# Patient Record
Sex: MALE | Race: WHITE | Employment: FULL TIME | URBAN - METROPOLITAN AREA
[De-identification: names, ages, dates, MRNs, and addresses within clinical notes are randomized per-mention and may not be internally consistent; named-entity substitution may affect disease eponyms.]

---

## 2017-06-09 PROBLEM — R11.0 NAUSEA IN ADULT: Status: ACTIVE | Noted: 2017-06-09

## 2017-06-09 PROBLEM — M62.08 DIASTASIS OF RECTUS ABDOMINIS: Status: ACTIVE | Noted: 2017-06-09

## 2017-06-09 PROBLEM — R73.09 ABNORMAL GLUCOSE: Status: ACTIVE | Noted: 2017-06-09

## 2017-08-26 PROBLEM — G57.93 NEUROPATHY OF BOTH FEET: Status: ACTIVE | Noted: 2017-08-26

## 2017-11-30 PROBLEM — E66.01 OBESITY, MORBID (HCC): Status: ACTIVE | Noted: 2017-11-30

## 2019-04-12 ENCOUNTER — HOSPITAL ENCOUNTER (OUTPATIENT)
Dept: ULTRASOUND IMAGING | Age: 54
Discharge: HOME | End: 2019-04-12
Attending: FAMILY MEDICINE
Payer: COMMERCIAL

## 2019-04-12 DIAGNOSIS — R10.32 LEFT LOWER QUADRANT PAIN: ICD-10-CM

## 2019-04-12 PROCEDURE — 76700 US EXAM ABDOM COMPLETE: CPT

## 2019-04-12 NOTE — PROGRESS NOTES
Let patient know his liver ultrasound does show an enlarged liver. It could be fatty liver, but he cannot be certain based on this ultrasound. I would like for him to see a gastroenterologist to be sure there is nothing more going on than fatty liver. He does have a small stone in his left kidney but it is not causing any blockage.

## 2019-10-02 PROBLEM — I83.12 VARICOSE VEINS OF BOTH LOWER EXTREMITIES WITH INFLAMMATION: Status: ACTIVE | Noted: 2019-10-02

## 2019-10-02 PROBLEM — I83.11 VARICOSE VEINS OF BOTH LOWER EXTREMITIES WITH INFLAMMATION: Status: ACTIVE | Noted: 2019-10-02

## 2019-10-02 PROBLEM — G25.81 RESTLESS LEG: Status: ACTIVE | Noted: 2019-10-02

## 2019-10-02 PROBLEM — F41.9 ANXIETY: Status: ACTIVE | Noted: 2019-10-02

## 2019-12-28 ENCOUNTER — HOSPITAL ENCOUNTER (OUTPATIENT)
Dept: CT IMAGING | Age: 54
Discharge: HOME | End: 2019-12-28
Attending: UROLOGY
Payer: COMMERCIAL

## 2019-12-28 DIAGNOSIS — R31.0 GROSS HEMATURIA: ICD-10-CM

## 2019-12-28 PROCEDURE — 74176 CT ABD & PELVIS W/O CONTRAST: CPT

## 2020-01-02 NOTE — PROGRESS NOTES
I reviewed with him. Please follow up with him to ensure Dr. Karley Villarreal has ordered a bone scan. If not, I would be happy to order his bone scan.

## 2020-01-27 ENCOUNTER — HOSPITAL ENCOUNTER (OUTPATIENT)
Dept: NUCLEAR MEDICINE | Age: 55
Discharge: HOME | End: 2020-01-27
Attending: FAMILY MEDICINE
Payer: COMMERCIAL

## 2020-01-27 DIAGNOSIS — M89.8X5 LYTIC BONE LESION OF HIP: ICD-10-CM

## 2020-01-27 PROCEDURE — A9503 TC99M MEDRONATE: HCPCS

## 2020-01-27 PROCEDURE — 78306 BONE IMAGING WHOLE BODY: CPT

## 2021-03-19 ENCOUNTER — HOSPITAL ENCOUNTER (OUTPATIENT)
Dept: GENERAL RADIOLOGY | Age: 56
Discharge: HOME | End: 2021-03-19
Payer: COMMERCIAL

## 2021-03-19 DIAGNOSIS — G89.4 CHRONIC PAIN SYNDROME: ICD-10-CM

## 2021-03-19 DIAGNOSIS — M54.16 RADICULOPATHY, LUMBAR REGION: ICD-10-CM

## 2021-03-19 PROCEDURE — 72050 X-RAY EXAM NECK SPINE 4/5VWS: CPT

## 2021-03-19 PROCEDURE — 72110 X-RAY EXAM L-2 SPINE 4/>VWS: CPT

## 2021-04-27 PROBLEM — E11.9 CONTROLLED TYPE 2 DIABETES MELLITUS WITHOUT COMPLICATION, WITHOUT LONG-TERM CURRENT USE OF INSULIN (HCC): Status: ACTIVE | Noted: 2021-04-27

## 2021-10-13 PROBLEM — E11.40 TYPE 2 DIABETES MELLITUS WITH DIABETIC NEUROPATHY (HCC): Status: ACTIVE | Noted: 2021-10-13

## 2022-02-21 ENCOUNTER — TRANSCRIBE ORDER (OUTPATIENT)
Dept: SCHEDULING | Age: 57
End: 2022-02-21

## 2022-02-21 DIAGNOSIS — M47.816 SPONDYLOSIS OF LUMBAR REGION WITHOUT MYELOPATHY OR RADICULOPATHY: Primary | ICD-10-CM

## 2022-03-01 ENCOUNTER — TRANSCRIBE ORDER (OUTPATIENT)
Dept: REGISTRATION | Age: 57
End: 2022-03-01

## 2022-03-01 ENCOUNTER — HOSPITAL ENCOUNTER (OUTPATIENT)
Dept: GENERAL RADIOLOGY | Age: 57
Discharge: HOME | End: 2022-03-01
Attending: PHYSICIAN ASSISTANT
Payer: COMMERCIAL

## 2022-03-01 ENCOUNTER — HOSPITAL ENCOUNTER (OUTPATIENT)
Dept: MRI IMAGING | Age: 57
Discharge: HOME | End: 2022-03-01
Attending: PHYSICIAN ASSISTANT
Payer: COMMERCIAL

## 2022-03-01 DIAGNOSIS — M54.14 THORACIC RADICULOPATHY: ICD-10-CM

## 2022-03-01 DIAGNOSIS — M54.14 THORACIC RADICULOPATHY: Primary | ICD-10-CM

## 2022-03-01 DIAGNOSIS — M47.816 SPONDYLOSIS OF LUMBAR REGION WITHOUT MYELOPATHY OR RADICULOPATHY: ICD-10-CM

## 2022-03-01 PROCEDURE — 72148 MRI LUMBAR SPINE W/O DYE: CPT

## 2022-03-01 PROCEDURE — 72070 X-RAY EXAM THORAC SPINE 2VWS: CPT

## 2022-03-18 PROBLEM — E11.9 CONTROLLED TYPE 2 DIABETES MELLITUS WITHOUT COMPLICATION, WITHOUT LONG-TERM CURRENT USE OF INSULIN (HCC): Status: ACTIVE | Noted: 2021-04-27

## 2022-03-18 PROBLEM — G57.93 NEUROPATHY OF BOTH FEET: Status: ACTIVE | Noted: 2017-08-26

## 2022-03-19 PROBLEM — E11.40 TYPE 2 DIABETES MELLITUS WITH DIABETIC NEUROPATHY (HCC): Status: ACTIVE | Noted: 2021-10-13

## 2022-03-19 PROBLEM — E66.01 OBESITY, MORBID (HCC): Status: ACTIVE | Noted: 2017-11-30

## 2022-03-19 PROBLEM — M62.08 DIASTASIS OF RECTUS ABDOMINIS: Status: ACTIVE | Noted: 2017-06-09

## 2022-03-19 PROBLEM — G25.81 RESTLESS LEG: Status: ACTIVE | Noted: 2019-10-02

## 2022-03-19 PROBLEM — R11.0 NAUSEA IN ADULT: Status: ACTIVE | Noted: 2017-06-09

## 2022-03-19 PROBLEM — I83.12 VARICOSE VEINS OF BOTH LOWER EXTREMITIES WITH INFLAMMATION: Status: ACTIVE | Noted: 2019-10-02

## 2022-03-19 PROBLEM — I83.11 VARICOSE VEINS OF BOTH LOWER EXTREMITIES WITH INFLAMMATION: Status: ACTIVE | Noted: 2019-10-02

## 2022-03-20 PROBLEM — F41.9 ANXIETY: Status: ACTIVE | Noted: 2019-10-02

## 2022-06-21 NOTE — PROGRESS NOTES
Franco Luu MD                                  [address] [facility] [address], [address] [facility] [address]                                  Phone [phone], Fax [phone]      History and Physical    Patient: Aguila Arciniega MRN: [id number]     YOB: 1965  Age: 61 y.o. Sex: male              PCP: Devorah Vieira MD   Date:  6/30/2022      Aguila Arciniega  who presents to the [facility] for consideration of bariatric surgery. This is his initial consultation preparing him for our multi-disciplinary surgical preparatory regimen. He presents with a height of 6' (1.829 m) and weight of (!) 380 lb (172.4 kg), giving him a Body mass index is 51.54 kg/m². He has an ideal body weight of 184 lbs, and excess body weight of 196 lbs. 30-day Bariatric Surgical Risk Percentage: 5.15, 10.84% The patient is interested in a VSG. MEDICAL HISTORY:  Morbid Obesity  Scruggs's esophagus  Hx of gastric ulcers  Prostate problems  Lymphedema  Back pain  Kidney stones    Comorbidity Yes or No   Obstructive Sleep Apnea  CPAP/BIPAP use= Yes Yes   Diabetes Mellitus, non-insulin Yes   Hypertension No   Gastroesophageal Reflux  Treatment Med= Prilosec Yes   Hyperlipidemia No   Coronary Artery Disease No   Cancer No   Asthma No   Osteoarthritis Yes   Joint Pain Yes       Other Yes or No   Venous Stasis Yes   Dialysis No   Long term use of steroids or immunocompromised conditions No   On Anticoagulants No       PRIOR WEIGHT LOSS ATTEMPTS:  Reports 11-15 previous weight loss attempts including medication (Phentermine), Weight Watchers, Atkins, and low calorie high protein diets. EXERCISE ASSESSMENT:  Reports physical activity some days of the week. DENTAL ISSUES:  No dental issues with chewing. PSYCHOSOCIAL:  He notes he is  and states main support person is his wife, Minoo Jacome.   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 His goal in pursuing surgical weight loss is to improve health. He reports no history of depression or anxiety. Denies history of mental health disorders. He states he is independent in his care, can drive a car, and can ambulate without assistance. HISTORY:  Past Medical History:   Diagnosis Date    Arthritis     osteo - in back    Chronic pain     spinal stenosis--- neck and lower restriction    GERD (gastroesophageal reflux disease)     occ-daily med    Ill-defined disease     swelling of both lower legs- SCIATICA- legs feel like \"burning\"    Morbid obesity (HCC)     BMI 49.6    Other ill-defined conditions(799.89)     stuffy nose/ infection    Other ill-defined conditions(799.89)     spinal stenosis    Other ill-defined conditions(799.89)     several kidney stones-passed on own    Peptic ulcer 10/11/2013    Unspecified sleep apnea     cpap- bring dos       He denies personal or family history of problems with anesthesia, bleeding, or clotting. He denies history of DVT or pulmonary embolism. He denies reflux or dysphagia. Past Surgical History:   Procedure Laterality Date    APPENDECTOMY      HEENT      sinus surgery; nasal polyps removed - Dr. Eloina Grigsby      t/a     Social History     Tobacco Use    Smoking status: Former Smoker     Quit date: 10/11/2004     Years since quittin.7    Smokeless tobacco: Never Used   Substance Use Topics    Alcohol use:  Yes     Alcohol/week: 0.0 standard drinks    Drug use: No     Family History   Problem Relation Age of Onset    Cancer Paternal Uncle         leukemia    Hypertension Maternal Grandfather     Cancer Father         Lung cancer - smoker    Glaucoma Maternal Grandmother     Cancer Paternal Grandmother         lung cancer - smoker        MEDICATIONS:  Current Outpatient Medications   Medication Sig Dispense Refill    SUCRALFATE PO Take by mouth      Ascorbic Acid (VITAMIN C CR) 1000 MG TBCR Take by mouth  Diclofenac Sodium 1.5 % SOLN 40 drops to a knee 4 times a day      DULoxetine (CYMBALTA) 30 MG extended release capsule Take 60 mg by mouth 2 times daily       fluticasone (FLONASE) 50 MCG/ACT nasal spray 2 sprays by Nasal route daily      Glucosamine 500 MG CAPS Take 500 mg by mouth 3 times daily      Magnesium 500 MG CAPS Take by mouth      mirtazapine (REMERON) 30 MG tablet Take 30 mg by mouth      naproxen (EC NAPROSYN) 500 MG EC tablet Take 500 mg by mouth 2 times daily (with meals)      omeprazole (PRILOSEC) 40 MG delayed release capsule Take 40 mg by mouth 2 times daily      tamsulosin (FLOMAX) 0.4 MG capsule TAKE 1 CAPSULE NIGHTLY      temazepam (RESTORIL) 30 MG capsule Take 30 mg by mouth. (Patient not taking: Reported on 6/30/2022)       No current facility-administered medications for this visit. ALLERGIES:  Allergies   Allergen Reactions    Morphine Other (See Comments)     \"makes me mean\"       ROS: The patient has no difficulty with chest pain or shortness of breath. No fever or chills. Comprehensive 13 point review of systems was otherwise unremarkable except as noted above. PHYSICAL EXAM:   /87   Pulse 99   Ht 6' (1.829 m)   Wt (!) 380 lb (172.4 kg)   BMI 51.54 kg/m²     General: Alert oriented cooperative white male in no acute distress. Eyes: Sclera are clear. Conjunctiva and lids within normal limits. No icterus. Ears and Nose: no gross deformities to visual inspection, gross hearing intact  Neck: Supple, trachea midline, no appreciable thyromegaly  Resp: No JVD. Breathing is  non-labored. Lungs clear to auscultation without wheezing or rhonchi   CV: RRR. No murmurs, rubs or gallops appreciated, Carotid bruits are absent  Abd: soft non-tender and non-distended without peritoneal signs. +bs    Psych:  Mood and affect appropriate. Short-term memory and understanding intact    ASSESSMENT:  Morbid obesity with a  Body mass index is 51.54 kg/m².  beginning multi-disciplinary surgical prep program.    PLAN:  We have discussed the patient's participation and reviewed the steps in our preparatory program. He has had a long history of failed diet and exercise programs and has good understanding about the risks, benefits, and commitment related to bariatric surgery. He has attended our comprehensive educational seminar. He is interested in proceeding with our program seeking the sleeve gastrectomy. Nutrition Recommendations:   · Diet Rx - Low-moderate calorie intake. Work on eating at least 3x/d with lean protein focus. 2 week pre-operative diet with caloric restriction of ~1200kcal/d. Exercise Recommendations: Exercise routine, incorporating both cardio and strength training, building up to 5x/wk for at least 30 minutes. Physical therapy evaluation for guidance on exercise routine. He will complete the following steps:  1. Complete bariatric labs after dietitian evaluation. 2.  Participation in our behavior modification program, reduced calorie diet program, and participation in our exercise program recommendations supervised by our office. 3.  Complete our required psychological evaluation. 4.  Reminded of policy of no weight gain during prep period. 5.  Upper GI  6. PT Consult   7. EGD  8. Bring C-PAP to hospital day of surgery. He will return after the above are complete for assessment of his progress and schedule surgery. Romayne Mylar  [facility] MD    Date:  6/30/2022

## 2022-06-30 ENCOUNTER — OFFICE VISIT (OUTPATIENT)
Dept: SURGERY | Age: 57
End: 2022-06-30
Payer: COMMERCIAL

## 2022-06-30 VITALS
HEIGHT: 72 IN | SYSTOLIC BLOOD PRESSURE: 138 MMHG | BODY MASS INDEX: 42.66 KG/M2 | HEART RATE: 99 BPM | DIASTOLIC BLOOD PRESSURE: 87 MMHG | WEIGHT: 315 LBS

## 2022-06-30 DIAGNOSIS — E11.9 CONTROLLED TYPE 2 DIABETES MELLITUS WITHOUT COMPLICATION, WITHOUT LONG-TERM CURRENT USE OF INSULIN (HCC): ICD-10-CM

## 2022-06-30 DIAGNOSIS — I83.12 VARICOSE VEINS OF BOTH LOWER EXTREMITIES WITH INFLAMMATION: ICD-10-CM

## 2022-06-30 DIAGNOSIS — G47.33 OSA (OBSTRUCTIVE SLEEP APNEA): ICD-10-CM

## 2022-06-30 DIAGNOSIS — K22.70 BARRETT'S ESOPHAGUS WITHOUT DYSPLASIA: ICD-10-CM

## 2022-06-30 DIAGNOSIS — I83.11 VARICOSE VEINS OF BOTH LOWER EXTREMITIES WITH INFLAMMATION: ICD-10-CM

## 2022-06-30 DIAGNOSIS — E66.01 OBESITY, MORBID, BMI 50 OR HIGHER (HCC): ICD-10-CM

## 2022-06-30 DIAGNOSIS — E66.01 MORBID OBESITY (HCC): Primary | ICD-10-CM

## 2022-06-30 DIAGNOSIS — M16.10 HIP ARTHRITIS: ICD-10-CM

## 2022-06-30 DIAGNOSIS — K21.00 GASTROESOPHAGEAL REFLUX DISEASE WITH ESOPHAGITIS WITHOUT HEMORRHAGE: ICD-10-CM

## 2022-06-30 DIAGNOSIS — K27.9 PEPTIC ULCER: ICD-10-CM

## 2022-06-30 DIAGNOSIS — Z01.812 ENCOUNTER FOR PRE-OPERATIVE LABORATORY TESTING: ICD-10-CM

## 2022-06-30 PROCEDURE — 99204 OFFICE O/P NEW MOD 45 MIN: CPT | Performed by: SURGERY

## 2022-06-30 PROCEDURE — 3044F HG A1C LEVEL LT 7.0%: CPT | Performed by: SURGERY

## 2022-07-07 ENCOUNTER — HOSPITAL ENCOUNTER (OUTPATIENT)
Dept: GENERAL RADIOLOGY | Age: 57
Discharge: HOME | End: 2022-07-10
Payer: COMMERCIAL

## 2022-07-07 DIAGNOSIS — E66.01 MORBID OBESITY (HCC): ICD-10-CM

## 2022-07-07 DIAGNOSIS — Z01.812 ENCOUNTER FOR PRE-OPERATIVE LABORATORY TESTING: ICD-10-CM

## 2022-07-07 DIAGNOSIS — K21.00 GASTROESOPHAGEAL REFLUX DISEASE WITH ESOPHAGITIS WITHOUT HEMORRHAGE: ICD-10-CM

## 2022-07-07 LAB
25(OH)D3 SERPL-MCNC: 36.8 NG/ML (ref 30–100)
EST. AVERAGE GLUCOSE BLD GHB EST-MCNC: 134 MG/DL
FERRITIN SERPL-MCNC: 61 NG/ML (ref 8–388)
FOLATE SERPL-MCNC: 17.8 NG/ML (ref 3.1–17.5)
HBA1C MFR BLD: 6.3 % (ref 4.8–5.6)
IRON SERPL-MCNC: 47 UG/DL (ref 35–150)
TSH, 3RD GENERATION: 1.93 UIU/ML (ref 0.36–3.74)
VIT B12 SERPL-MCNC: 831 PG/ML (ref 193–986)

## 2022-07-07 PROCEDURE — A4641 RADIOPHARM DX AGENT NOC: HCPCS | Performed by: PHYSICIAN ASSISTANT

## 2022-07-07 PROCEDURE — 6360000004 HC RX CONTRAST MEDICATION: Performed by: PHYSICIAN ASSISTANT

## 2022-07-07 PROCEDURE — 74240 X-RAY XM UPR GI TRC 1CNTRST: CPT

## 2022-07-07 RX ADMIN — BARIUM SULFATE 355 ML: 0.6 SUSPENSION ORAL at 09:55

## 2022-07-11 ENCOUNTER — HOSPITAL ENCOUNTER (OUTPATIENT)
Dept: PHYSICAL THERAPY | Age: 57
Setting detail: RECURRING SERIES
Discharge: HOME | End: 2022-07-14
Payer: COMMERCIAL

## 2022-07-11 PROCEDURE — 97162 PT EVAL MOD COMPLEX 30 MIN: CPT

## 2022-07-11 ASSESSMENT — PAIN SCALES - GENERAL: PAINLEVEL_OUTOF10: 6

## 2022-07-11 NOTE — THERAPY EVALUATION
Paralee Scott  : 1965  Primary: [address]  Secondary:  [address] [address]  Phone: [phone]  Fax: [phone] Plan Frequency: 1 x 1week    Plan of Care/Certification Expiration Date: 22      PT Visit Info: Total # of Visits to Date: 1      OUTPATIENT PHYSICAL THERAPY:OP NOTE TYPE: Initial Assessment 2022               Episode  Appt Desk         Treatment Diagnosis:  Generalized Muscle Weakness (M62.81)  Medical/Referring Diagnosis:  Morbid (severe) obesity due to excess calories [E66.01]  Referring Physician:  Anali Shea MD MD Orders:  PT Eval and Treat   Return MD Appt:  tbd  Date of Onset:  Onset Date: 22     Allergies:  Morphine  Restrictions/Precautions:    Restrictions/Precautions: None  No data recorded   Medications Last Reviewed:  2022     SUBJECTIVE   History of Injury/Illness (Reason for Referral): Pt preparing for bariatric surgery. He has B hip osteoarthritis and needs THR. He also has back pain and is seen at pain management for that with injections for management. B neuropathy in B feet and both hands. Pt has a rowing machine that he uses on some days. He states there are days he cannot squat to use rowing machine. States he can walk for distance with UE support. Patient Stated Goal(s):  \"to get exercises that I need following surgery\"  Initial:     6/10 Post Session:      /10   7/10 hips constant  6/10 back variable  Past Medical History/Comorbidities:   Mr. Thor Coyle  has a past medical history of Arthritis, Chronic pain, GERD (gastroesophageal reflux disease), Ill-defined disease, Morbid obesity ([facility] [address] Other ill-defined conditions(799.89), Other ill-defined conditions(799.89), Other ill-defined conditions(799.89), Peptic ulcer, and Unspecified sleep apnea. Mr. Thor Coyle  has a past surgical history that includes Appendectomy; heent; and heent.   Social History/Living Environment:   Lives With: Spouse     Prior Level of Function/Work/Activity:   Prior level of function: Independent  Occupation: Full time employment  No data recordedNo data recorded   Learning:   Does the patient/guardian have any barriers to learning?: No barriers  Will there be a co-learner?: No  What is the preferred language of the patient/guardian?: English  How does the patient/guardian prefer to learn new concepts?: Listening; Reading; Demonstration     Fall Risk Scale: Total Score: 40  [place] Fall Risk: Medium (25-44)           OBJECTIVE   Observation- Pt enters clinic using one forearm crutch. Pt has sever kyphosis and forward head posture with flattened lordosis. ROM- limited throughout spine, hips, shoulder R greater than L  Pt reports he has severe arthritis in multiple joints. B hips requiring replacement per pt. Strength- Pt has limited strength due to limits in his ROM. He has compensated with stronger muscles particularly evident in shoulders but throughout LE also  Gait- Pt ambulates with slowed very laborious gait using forearm crutch. His posture is such that he is looking at the ground due to forward head and kyphosis. Decreased step length and julito. Balance- has balance deficit - evident in standing    Exercise plan- discussed using a shopping cart for walking, walking in a pool and other pool activity to get aerobic portion of exercise in  Pt performed 5 reps of exercises in handout and modified several ex and noted it on sheets which were given to pt      ASSESSMENT   Initial Assessment:  Pt with impaired mobility and endurance, strength and flexibility. Independent with Exercise program for home. Goal of independent ex reached today. Problem List: (Impacting functional limitations): Body Structures, Functions, Activity Limitations Requiring Skilled Therapeutic Intervention: Decreased functional mobility ;  Decreased strength; Decreased ROM     Therapy Prognosis:   Therapy Prognosis: Good     Assessment Complexity:   Medium Complexity  PLAN   Effective Dates: 7/11/22 TO Plan of Care/Certification Expiration Date: 07/11/22     Frequency/Duration: Plan Frequency: 1 x 1week     Interventions Planned (Treatment may consist of any combination of the following):    Current Treatment Recommendations: Strengthening; Home exercise program     Goals: (Goals have been discussed and agreed upon with patient.)  Short-Term Functional Goals: Time Frame: 1 visit    MET TODAY  1. Independent with post op bariatric ex program           Outcome Measure: Tool Used: Lower Extremity Functional Scale (LEFS)  Score:  Initial: 19/80 Most Recent: X/80 (Date: -- )   Interpretation of Score: 20 questions each scored on a 5 point scale with 0 representing \"extreme difficulty or unable to perform\" and 4 representing \"no difficulty\". The lower the score, the greater the functional disability. 80/80 represents no disability. Minimal detectable change is 9 points. Medical Necessity:    required skilled PT for exercise instruction and modification.    Total Duration:  Eval 45 minutes  Time In: 1000  Time Out: 1514    Regarding Nish Naranjo's therapy, I certify that the treatment plan above will be carried out by a therapist or under their direction. Thank you for this referral,  Marcus Salinas PT     Referring Physician Signature:  Anali Shea MD _______________________________ Date _____________        Post Session Pain  Charge Capture  PT Visit Info  POC Link  Treatment Note Link  MD Guidelines  MyChart

## 2022-07-12 LAB
COTININE SERPL-MCNC: <1 NG/ML
NICOTINE SERPL-MCNC: <1 NG/ML

## 2022-07-20 ENCOUNTER — OFFICE VISIT (OUTPATIENT)
Dept: BEHAVIORAL/MENTAL HEALTH CLINIC | Facility: CLINIC | Age: 57
End: 2022-07-20
Payer: COMMERCIAL

## 2022-07-20 DIAGNOSIS — F54 PSYCHOLOGICAL AND BEHAVIORAL FACTORS ASSOCIATED WITH DISORDERS OR DISEASES CLASSIFIED ELSEWHERE: Primary | ICD-10-CM

## 2022-07-20 DIAGNOSIS — E66.01 OBESITY, MORBID, BMI 40.0-49.9 (HCC): ICD-10-CM

## 2022-07-20 PROCEDURE — 90791 PSYCH DIAGNOSTIC EVALUATION: CPT | Performed by: SOCIAL WORKER

## 2022-07-20 ASSESSMENT — LIFESTYLE VARIABLES
HOW OFTEN DO YOU HAVE A DRINK CONTAINING ALCOHOL: MONTHLY OR LESS
HOW MANY STANDARD DRINKS CONTAINING ALCOHOL DO YOU HAVE ON A TYPICAL DAY: 1 OR 2

## 2022-07-20 ASSESSMENT — PATIENT HEALTH QUESTIONNAIRE - PHQ9
SUM OF ALL RESPONSES TO PHQ QUESTIONS 1-9: 4
4. FEELING TIRED OR HAVING LITTLE ENERGY: 1
9. THOUGHTS THAT YOU WOULD BE BETTER OFF DEAD, OR OF HURTING YOURSELF: 0
8. MOVING OR SPEAKING SO SLOWLY THAT OTHER PEOPLE COULD HAVE NOTICED. OR THE OPPOSITE, BEING SO FIGETY OR RESTLESS THAT YOU HAVE BEEN MOVING AROUND A LOT MORE THAN USUAL: 0
6. FEELING BAD ABOUT YOURSELF - OR THAT YOU ARE A FAILURE OR HAVE LET YOURSELF OR YOUR FAMILY DOWN: 0
5. POOR APPETITE OR OVEREATING: 0
2. FEELING DOWN, DEPRESSED OR HOPELESS: 0
SUM OF ALL RESPONSES TO PHQ9 QUESTIONS 1 & 2: 0
3. TROUBLE FALLING OR STAYING ASLEEP: 3
10. IF YOU CHECKED OFF ANY PROBLEMS, HOW DIFFICULT HAVE THESE PROBLEMS MADE IT FOR YOU TO DO YOUR WORK, TAKE CARE OF THINGS AT HOME, OR GET ALONG WITH OTHER PEOPLE: 0
SUM OF ALL RESPONSES TO PHQ QUESTIONS 1-9: 4
1. LITTLE INTEREST OR PLEASURE IN DOING THINGS: 0
7. TROUBLE CONCENTRATING ON THINGS, SUCH AS READING THE NEWSPAPER OR WATCHING TELEVISION: 0

## 2022-07-20 ASSESSMENT — ANXIETY QUESTIONNAIRES
3. WORRYING TOO MUCH ABOUT DIFFERENT THINGS: 0
2. NOT BEING ABLE TO STOP OR CONTROL WORRYING: 0
5. BEING SO RESTLESS THAT IT IS HARD TO SIT STILL: 0
7. FEELING AFRAID AS IF SOMETHING AWFUL MIGHT HAPPEN: 0
GAD7 TOTAL SCORE: 0
6. BECOMING EASILY ANNOYED OR IRRITABLE: 0
4. TROUBLE RELAXING: 0
1. FEELING NERVOUS, ANXIOUS, OR ON EDGE: 0

## 2022-07-20 NOTE — PROGRESS NOTES
CONFIDENTIAL PSYCHOLOGICAL REPORT  BARIATRIC SURGERY PSYCHOLOGICAL ASSESSMENT      Patient Name:Mustapha Nuno    Date of Interview:7/20/2022    Date of Report: 7/20/22    Patient Surgery Status:    APPROVED    Gender: male    Medical Record #:[id number]    YOB: 1965    Current Age:57 y.o. Surgeon: Patricio    Location of Evaluation: [facility]    Duration: 60 mins    : Soraya Lewis. JOSE LUIS Fernandez    Interview Conducted: __XX__ In Office  ____ Virtually(Doxy[employer]me)  SW and pt were located in the state [facility]. Consents and Disclosures  Client was identified by full name before interview began. Informed consent was discussed and  obtained. Details regarding the limits of confidentiality, expectations for psychological  procedures and services, provider credentials, client rights and responsibilities, and procedures  for reporting any concerns regarding treatment were discussed with this individual. Details  related to duty to warn were made explicit and client voiced understanding. Patient had capacity  to provide full consent, was allowed to ask questions, expressed understanding of the  information presented and voluntarily gave consent for evaluation and treatment. From a psychological perspective, this patient is considered a VERY GOOD  candidate for bariatric surgery at the time of this interview. Summary of Findings:Pt is a 61y.o. year old male, current weight of  380  pounds, who presented for psychological evaluation for gastric sleeve surgery. In review of the data and the information obtained from this assessment, there do not appear to be any absolute contraindications for successful bariatric weight loss surgery. This pt demonstrates good knowledge and understanding of the surgical procedures and processes, reasonable post surgery expectations, high motivation for weight loss s/p surgery, and a lack of significant alcohol or drug use.   The pt demonstrates a high motivation for weight loss to increase activity level, enhance energy, engage in greater activity, and participate more actively with family. 's Concerns/Risk Factors:  No significant risk factors were identified. However, the pt does have a long history of being unable to maintain diet plans once his goal weight is met. He also has a very poor sleep pattern and he seems to compensate for this by drinking large amounts of caffeine. He will need much encouragement to eliminate caffeine and he may need to better regulate his sleep pattern in order to be successful. Treatment Plan/Recommendations:  Patient and treatment team should be continuously aware of any changes in mental health status, before, during, and after surgery. Should this patient begin to experience any symptoms related to depression, anxiety, or an eating disorder, the patient should be seen for psychological assistance. The pt was encouraged to attend the Bariatric Support Group on a regular basis following surgery to increase the level of support and to maximize the chances of success. This support may also help him maintain the bariatric diet after his goal weight is met. The pt also has a very poor sleep pattern and he seems to compensate for this by drinking large amounts of caffeine. He will need much encouragement to eliminate caffeine and he may need to better regulate his sleep pattern in order to be successful. Diagnosis:    1. Psychological and behavioral factors associated with disorders or diseases classified elsewhere    2.  Obesity, morbid, BMI 40.0-49.9 ([facility])      Evaluation Procedures:   [x]Interview with:  patient   [x]Review of records  [x]Screenings: DAST, AUDIT-C, PHQ, MALU, MDQ, REALM R, SF,       EAT 26, Amaya's SE  Results:    PHQ 9:     4    MALU 7:     0     DAST 10:    No drug use  MDQ:     Not indicative of a mood disorder  AUDIT C:    1  EAT 26:    Not administered- no risk factors  Amaya's Self Esteem:  28         Weight Management History (trajectory, past weight loss attempts, environmental and physiological factors, perceived obstacles): The pt.'s primary motivation for weight loss surgery at this time is health. The pt states he is very motivated to have WLS surgery at this time so he will be eligible for a hip replacement. He walks with a cane with much difficulty due to the amount of pain he experiences. The pt states he was a little chubby as a kid. As a teen, he went to the gym and lifted weights a lot. He was a big beth but he was not fat. Approximately twelve years ago, he began having issues in his back and he could no longer lift weights. He became more sedentary and this resulted in extreme weight gain. The pt states his mother was health conscious when he was a child and she served healthy, well-balanced meals. He denies medical or genetic contributors to his obesity. He states :he got old and fat\". He has attempted to lose weight in the past.  He shed about 60 pounds on the Atkins diet. He states once he lost weight and he would reach his goal, he would become less disciplined and the weight would return. The pt also lost weight taking over the counter weight loss pills, but these pills were retracted from the market when it was determined that the pills caused cardiac issues. He states his back, and hips have become so problematic and he gets very little exercise. He finds that he has been unable to lose weight due to the lack of activity even though he only eats one meal a day. []Weight Watchers []Keto      [x]Atkins   []Herbal Life  []Obesity Meds   []Dietician    Current Eating Habits (meal planning, portion size, frequency of meals, grazing behaviors, snacking patterns): The pt states he eats about 800-1200 calories a day at this time. He will have a protein shake for supper and breakfast most of the time.   Oatmeal, supplements, and banana are added to the shake. He will generally cook on Sunday and Monday. He likes to smoke meat and he will eat leftovers from his cooking for lunch during the week. He will have fruit, vegetables, and sometimes potatoes, with his lunch. He occasionally snacks in the evening on Cheerios or popcorn. He admits to being a heavy coffee drinker and he can consume 48 ozs of coffee a day with Splenda and fat free creamer. He knows he will have to stop caffeine consumption before the surgery. He is trying to stop drinking 1-2 [employer] a week. He does occasionally drink unsweet tea. He reports he always feels hungry on this current diet plan. Eating Disorder Symptoms (binge eating with loss of control, purging, restrictive eating, night eating, sleep related eating): The pt denies vomiting or excessive exercise to rid calories, abuse of laxatives, severely restrictive dieting, or a sense of a loss of control while eating. The pt denies preoccupation with food. The pt denies a drastic amount of weight loss in a three month period. The EAT 26 screening was not completed because no risk factors were noted during this initial screening. Physical Activity (past and present): The pt has much difficulty ambulating because of his knees. He also has problems with his back, neck, and shoulders. Movement causes pain but he has been trying to do the exercises with the band that the PT recommended. Before the pt began having medical problems, he went to the gym often and lifted weights. This stopped eight years ago when his back problems began. Sleep Pattern:  The pt states he does not get much sleep. He uses a C-Pap. He states he falls asleep around 2 am but his sleep is light and he mainly doses. He states it is hard for him to shut his mind down when he sleeps. His doctor prescribed Remeron and this has helped. He rises around 7 am and he reports he rarely feels well rested.       Medical History: [x]GERD  []addiction  []cardiac illness  []hypertension   [x]diabetes  [x]sleepapnea  []high chol   [x]chronic pain  []nutrition/obesity/eating disorder  [x]Other (osteoarthritis)          Objective Assessment:   Pt is ambulatory, drives and is independent with ADLS. The pt appears to be a reasonable candidate for bariatric weight loss surgery. The has many unsuccessful weight loss attempts in the past and the pt has medical issues that could see significant improvement following the surgery. No risk factors that would absolutely contraindicate surgery were identified. Current Medications:  Outpatient Encounter Medications as of 7/20/2022   Medication Sig Dispense Refill    SUCRALFATE PO Take by mouth      Ascorbic Acid (VITAMIN C CR) 1000 MG TBCR Take by mouth      Diclofenac Sodium 1.5 % SOLN 40 drops to a knee 4 times a day      DULoxetine (CYMBALTA) 30 MG extended release capsule Take 60 mg by mouth 2 times daily       fluticasone (FLONASE) 50 MCG/ACT nasal spray 2 sprays by Nasal route daily      Glucosamine 500 MG CAPS Take 500 mg by mouth 3 times daily      Magnesium 500 MG CAPS Take by mouth      mirtazapine (REMERON) 30 MG tablet Take 30 mg by mouth      naproxen (EC NAPROSYN) 500 MG EC tablet Take 500 mg by mouth 2 times daily (with meals)      omeprazole (PRILOSEC) 40 MG delayed release capsule Take 40 mg by mouth 2 times daily      tamsulosin (FLOMAX) 0.4 MG capsule TAKE 1 CAPSULE NIGHTLY      temazepam (RESTORIL) 30 MG capsule Take 30 mg by mouth. (Patient not taking: Reported on 6/30/2022)       No facility-administered encounter medications on file as of 7/20/2022.       Current Mental Health Symptoms:  [x]None  Symptoms: []sadness, []crying spells, []low motivation, []fatigue, []lack of interest,   []decreased concentration, []anxiety, panic attacks, []suicidal thoughts, []homicidal thoughts, []hallucinations, []delusions, []racing thoughts, []grandiosity, []masha,   []eating disordered symptoms, []obsessions and compulsions, []hopelessness,   []feelings of failure, []excessive worrying[]other    Mental Health History (including family history and past symptoms): The pt states he saw a psychiatrist for about a year when he was in his twenties. The psychiatrist diagnosed him with Bipolar Disorder and prescribed medications. The pt states the medications made him worse and he stopped going. He says he learned to let things go and not get angry when things did not go his way. He does not believe he is Bipolar and the MDQ did not indicate a mood disorder. Orientation: Pt was oriented to person, place, time, and purpose of interview. Appearance/Personal Hygiene: Pt was appropriately dressed and neatly groomed. Eye Contact: Good    Psychosis:  Hallucinations: None  Paranoia/Delusions: None                                          Insight/Judgment: Insight and judgment are within normal limits. Intelligence: The pt.'s intelligence level appears to be within normal limits. The REALM-R, REALM-SF, and the Medical Numbers screenings were administered and no deficits were indicated. The pt is literate and has the ability to read and recognizes basic medical words. The pt can compute simple medical equations that may be needed to maintain the post surgery bariatric diet. Memory/Cognition/Executive Functioning:The pt denies memory of cognitive deficits and none were noted during this evaluation. The pt denies having past academic problems and the pt denies ever being diagnosed with a learning disability. The pt.'s executive functioning skills appear to be intact.      Mood/Affect:   []Angry  []Anxious  []Appropriate  []Bright   []Distressed  []Fatigued  []Flat   []Sad, Depressed  []Guarded  []Irritable  []Labile  [x]Even               Thought Process:   []Blocking  []Circumstantial  []Clang   [x]Coherent  []Egocentric   []Evasive  []Flight of ideas  []Incoherent  []Loose Assn   []Magical think   []Neologisms  []Perseveration  [x]Rational  []Tangential  []Word Salad           Suicidal Ideation/Intentions (present status, past history, plan, intent, past attempts): The pt denies current or past suicidal ideation. Homicidal Ideation/Intentions: The pt denies current or past homicidal ideation. Substance Abuse (present use, past use, substances used, family history):    Current Living Situation (type of dwelling, length of residence, safety concerns, stability):  Persons living in the residence? The pt lives with his wife and their three dogs. []Rent  [x]Own  [x]House []Mobile Home []Apt  []Condo/Town Home  Time at Current Residence:14 years  Utilities Working: Yes  Safe/Secure Environment: Yes    Relationship Status (past relationships, current status, children, relationship issues): The pt has been  one time for 29 years. He and his wife do not have children. He reports his wife is supportive and he is happy in his marriage. His wife supports the pt having the surgery. He had one serious relationship before he . He states they broke up because they were not the same page about many things. 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 He changed jobs because he no longer do the walking that was=needed in the mill. He works from home now. IN the past, he denies having chronic problems with co-workers. Education: The pt is a HS grad and he has some college and several technical certificates.  History: []Yes  [x]No    Legal Issues: []Yes  [x]No    Support Systems: The pt states he primarily gets his support from his wife, a good friend, and his Judaism family. Self Esteem/ Body Image:The pt states his self esteem is good and his self esteem screening supports this.     Family of Origin Dynamics: The pt states his parents are from [address] [place] [address]. He has lived here for about 50 years. He states his childhood was not good. He has a younger sister. He remembers his mother working long hours and he and his sister having to fend for themselves. This contrasted his friends who had stay at home mothers. His parents  when the pt was young. There was financial strain in the family until his mother remarried when he was 14. He liked his stepfather. He did not get to know his biological father until he was in his 25s. He denies childhood abuse but states he does not have a lot of fond childhood memories. He and his sister spent a lot of time in . Today, the pt states he a good relationship with his mom, sister. He also has supportive in-laws. Past Abuse/Trauma/Grief:  The pt denies past trauma or abuse. He has experienced losses. His biological father  12 years ago, his stepfather  last year, and his has lost his grandparents. He states this losses were significant but he has been able to move forward and he denies complicated grief factors. Coping Skills: The pt states he deals with stress by playing with his three dogs. Motivation Level: The pt is highly motivated in order to be active and involved with family. The pt wishes to increase quality of life and improve activity level. The pt hopes to be present with family for as long as possible and the possibility of living a longer and healthier life will increase with better health and weight loss. The pt is motivated to correct and/or prevent health-related issues. Knowledge and Understanding of Procedure: The pt reports that she has been well educated by the Bariat[facility] Clinic about the nature of the procedure and the lifestyle changes that this surgery will necessitate following surgery. Surgical Expectations: The pt expects to lose roughly 80 pounds. He must lose 80 pounds in order to be eligible for hip replacement surgery.   The pt expects to have less pain, more mobility and higher stamina. Additional Comments: The pt was given handouts on Mindful Eating and this was discussed. At the mandatory follow up appointment this SW will discuss additional coping skills to deal with stress and a behavioral plan, to address lifestyle, emotional, and interpersonal issues that may occur following surgery,will be developed. PHQ-9  7/20/2022 5/2/2022   Little interest or pleasure in doing things 0 -   Little interest or pleasure in doing things - 0   Feeling down, depressed, or hopeless 0 -   Trouble falling or staying asleep, or sleeping too much 3 -   Feeling tired or having little energy 1 -   Poor appetite or overeating 0 -   Feeling bad about yourself - or that you are a failure or have let yourself or your family down 0 -   Trouble concentrating on things, such as reading the newspaper or watching television 0 -   Moving or speaking so slowly that other people could have noticed. Or the opposite - being so fidgety or restless that you have been moving around a lot more than usual 0 -   Thoughts that you would be better off dead, or of hurting yourself in some way 0 -   PHQ-2 Score 0 -   Total Score PHQ 2 - 0   PHQ-9 Total Score 4 -   If you checked off any problems, how difficult have these problems made it for you to do your work, take care of things at home, or get along with other people? 0 -      MALU-7 SCREENING 7/20/2022   Feeling nervous, anxious, or on edge Not at all   Not being able to stop or control worrying Not at all   Worrying too much about different things Not at all   Trouble relaxing Not at all   Being so restless that it is hard to sit still Not at all   Becoming easily annoyed or irritable Not at all   Feeling afraid as if something awful might happen Not at all   MALU-7 Total Score 0        Vargas Level.  [facility], CATHIE-CP     Date:  7/20/2022

## 2022-08-01 ENCOUNTER — OFFICE VISIT (OUTPATIENT)
Dept: SURGERY | Age: 57
End: 2022-08-01
Payer: COMMERCIAL

## 2022-08-01 VITALS
HEIGHT: 72 IN | HEART RATE: 117 BPM | DIASTOLIC BLOOD PRESSURE: 81 MMHG | SYSTOLIC BLOOD PRESSURE: 154 MMHG | BODY MASS INDEX: 42.66 KG/M2 | WEIGHT: 315 LBS

## 2022-08-01 DIAGNOSIS — G47.33 OSA (OBSTRUCTIVE SLEEP APNEA): ICD-10-CM

## 2022-08-01 DIAGNOSIS — E66.01 MORBID OBESITY (HCC): Primary | ICD-10-CM

## 2022-08-01 DIAGNOSIS — M16.10 HIP ARTHRITIS: ICD-10-CM

## 2022-08-01 DIAGNOSIS — K27.9 PEPTIC ULCER: ICD-10-CM

## 2022-08-01 DIAGNOSIS — I83.12 VARICOSE VEINS OF BOTH LOWER EXTREMITIES WITH INFLAMMATION: ICD-10-CM

## 2022-08-01 DIAGNOSIS — E66.01 OBESITY, MORBID, BMI 50 OR HIGHER (HCC): ICD-10-CM

## 2022-08-01 DIAGNOSIS — K22.70 BARRETT'S ESOPHAGUS WITHOUT DYSPLASIA: ICD-10-CM

## 2022-08-01 DIAGNOSIS — Z71.82 EXERCISE COUNSELING: ICD-10-CM

## 2022-08-01 DIAGNOSIS — K21.00 GASTROESOPHAGEAL REFLUX DISEASE WITH ESOPHAGITIS WITHOUT HEMORRHAGE: ICD-10-CM

## 2022-08-01 DIAGNOSIS — I83.11 VARICOSE VEINS OF BOTH LOWER EXTREMITIES WITH INFLAMMATION: ICD-10-CM

## 2022-08-01 DIAGNOSIS — Z71.3 DIETARY COUNSELING: ICD-10-CM

## 2022-08-01 DIAGNOSIS — E11.9 CONTROLLED TYPE 2 DIABETES MELLITUS WITHOUT COMPLICATION, WITHOUT LONG-TERM CURRENT USE OF INSULIN (HCC): ICD-10-CM

## 2022-08-01 PROBLEM — M16.0 BILATERAL HIP JOINT ARTHRITIS: Status: ACTIVE | Noted: 2021-06-18

## 2022-08-01 PROBLEM — M75.41 SUBACROMIAL IMPINGEMENT, RIGHT: Status: ACTIVE | Noted: 2021-08-13

## 2022-08-01 PROBLEM — M54.16 LUMBAR RADICULOPATHY, CHRONIC: Status: ACTIVE | Noted: 2022-05-18

## 2022-08-01 PROBLEM — M19.011 ARTHRITIS OF RIGHT ACROMIOCLAVICULAR JOINT: Status: ACTIVE | Noted: 2021-08-13

## 2022-08-01 PROCEDURE — 3044F HG A1C LEVEL LT 7.0%: CPT | Performed by: PHYSICIAN ASSISTANT

## 2022-08-01 PROCEDURE — 99215 OFFICE O/P EST HI 40 MIN: CPT | Performed by: PHYSICIAN ASSISTANT

## 2022-08-01 RX ORDER — BETAMETHASONE DIPROPIONATE 0.5 MG/G
LOTION TOPICAL
COMMUNITY
Start: 2022-06-27 | End: 2022-08-15 | Stop reason: ALTCHOICE

## 2022-08-01 RX ORDER — OXYCODONE HYDROCHLORIDE 30 MG/1
30 TABLET ORAL EVERY 8 HOURS PRN
COMMUNITY
End: 2022-08-15 | Stop reason: ALTCHOICE

## 2022-08-01 RX ORDER — NAPROXEN 500 MG/1
TABLET ORAL
COMMUNITY
Start: 2022-05-18 | End: 2022-08-01

## 2022-08-01 RX ORDER — FLUTICASONE PROPIONATE 0.05 %
CREAM (GRAM) TOPICAL
COMMUNITY
Start: 2022-06-27 | End: 2022-08-15 | Stop reason: ALTCHOICE

## 2022-08-01 RX ORDER — KETOCONAZOLE 20 MG/ML
SHAMPOO TOPICAL
COMMUNITY
Start: 2022-05-23 | End: 2022-08-15 | Stop reason: ALTCHOICE

## 2022-08-01 RX ORDER — HYDROCHLOROTHIAZIDE 12.5 MG/1
12.5 TABLET ORAL DAILY
COMMUNITY
Start: 2022-07-07 | End: 2022-08-15 | Stop reason: SDUPTHER

## 2022-08-01 RX ORDER — DULOXETIN HYDROCHLORIDE 60 MG/1
CAPSULE, DELAYED RELEASE ORAL
COMMUNITY
Start: 2022-07-26 | End: 2022-08-01

## 2022-08-01 NOTE — PROGRESS NOTES
Demetri Spangler PA-C  Bariatric & Advanced Laparoscopic Surgery & Endoscopy  [address] [place] [address] 2790, [address] [facility]  Katharina Ordonez  Phone [phone]   Fax [phone]    Date of visit: 2022      Primary/Requesting provider: Edwar Ambriz MD         Name: Jos Kuo      MRN: [id number]       : 1965       Age: 61 y.o. Sex: male        PCP: Edwar Ambriz MD     CC:  Bariatric monthly dietary follow up    Surgeon: Dr. Melissa Daigle. Patricio    Procedure: laparoscopic sleeve gastrectomy    Surgical Consult Date: 2022    The patient is a 61 y.o. male presenting with a height of 6' (1.829 m) and weight of (!) 376 lb (170.6 kg), giving him a Body mass index is 50.99 kg/m². He has an ideal body weight of 184 lbs, and excess body weight of 192 lbs. He started our program with a weight of 380 lbs, lost 4lbs. He is in the process of completing all aspects of our prep program and to be deemed an acceptable candidate for bariatric surgery. Patient has a long term history of obesity with multiple failed attempts at weight reduction. Patient denies any changes in health history or physical health since last visit. Patient has been adherent to his diet and exercise regimen. Patient feels that he is well informed regarding his bariatric surgery choice and would like to proceed with a laparoscopic sleeve gastrectomy for weight reduction, improvement of his medical conditions, and improved quality of life. Patient verbalized understanding of the risks associated with bariatric surgery. Patient also verbalized understanding that bariatric surgery is a tool and that weight reduction is dependent on behavioral changes in regards to what he eats and how much.     NUTRITION ASSESSMENT:   Diet Recall:   Breakfast: protien shake  Snack: granola bar  Lunch: sandwich  Dinner: protein bar  Snack: dry cereal, protein bar  Beverages: water, sf tea  Habits:   Eating 3x/day: yes  Elimination of caffeine and carbonated beverages: yes  Practicing not drinking with meals: yes     EXERCISE ASSESSMENT: Pt is currently exercising via resistance bands, stretching, walking daily for 20 minutes. NIH Guidelines reviewed with pt. PSYCHOLOGICAL EVALUATION:  In progress, 07/20/2022 with Stevenson Mohs deeming him an appropriate surgical candidate. DIETITIAN EVALUATION:  In progress with Andreas Cunha RD, LD  BARIATRIC LABS:  Completed, 07/07/2022 (A1C 6.3, folate 17.8)  UPPER GI:  Completed, 07/07/2022  Small hiatal hernia. No reflux demonstrated during the course of the   exam. Otherwise unremarkable.    EGD:  Scheduled for 08/05/2022  PHYSICAL THERAPY EVALUATION FOR MOBILITY OPTIMIZATION:  Completed, 07/11/2022    MEDICAL HISTORY:  Morbid Obesity  Scruggs's esophagus  Hx of gastric ulcers  Prostate problems  Lymphedema  Back pain  Kidney stones     Comorbidity Yes or No   Obstructive Sleep Apnea  CPAP/BIPAP use= Yes Yes   Diabetes Mellitus, non-insulin Yes   Hypertension No   Gastroesophageal Reflux  Treatment Med= Prilosec Yes   Hyperlipidemia No   Coronary Artery Disease No   Cancer No   Asthma No   Osteoarthritis Yes   Joint Pain Yes         Other Yes or No   Venous Stasis Yes   Dialysis No   Long term use of steroids or immunocompromised conditions No   On Anticoagulants No      PMH:  Past Medical History:   Diagnosis Date    Arthritis     osteo - in back    Chronic pain     spinal stenosis--- neck and lower restriction    GERD (gastroesophageal reflux disease)     occ-daily med    Ill-defined disease     swelling of both lower legs- SCIATICA- legs feel like \"burning\"    Morbid obesity (HCC)     BMI 49.6    Other ill-defined conditions(799.89)     stuffy nose/ infection    Other ill-defined conditions(799.89)     spinal stenosis    Other ill-defined conditions(799.89)     several kidney stones-passed on own    Peptic ulcer 10/11/2013    Unspecified sleep apnea     cpap- bring dos       PSH:  Past Surgical History: Procedure Laterality Date    APPENDECTOMY      HEENT      sinus surgery; nasal polyps removed - Dr. Delvin Boo      t/a       MEDS:  Current Outpatient Medications   Medication Sig Dispense Refill    SUCRALFATE PO Take by mouth      Ascorbic Acid (VITAMIN C CR) 1000 MG TBCR Take by mouth      Diclofenac Sodium 1.5 % SOLN 40 drops to a knee 4 times a day      DULoxetine (CYMBALTA) 30 MG extended release capsule Take 60 mg by mouth 2 times daily       fluticasone (FLONASE) 50 MCG/ACT nasal spray 2 sprays by Nasal route daily      Glucosamine 500 MG CAPS Take 500 mg by mouth 3 times daily      Magnesium 500 MG CAPS Take by mouth      mirtazapine (REMERON) 30 MG tablet Take 30 mg by mouth      naproxen (EC NAPROSYN) 500 MG EC tablet Take 500 mg by mouth 2 times daily (with meals)      omeprazole (PRILOSEC) 40 MG delayed release capsule Take 40 mg by mouth 2 times daily      tamsulosin (FLOMAX) 0.4 MG capsule TAKE 1 CAPSULE NIGHTLY      temazepam (RESTORIL) 30 MG capsule Take 30 mg by mouth. (Patient not taking: Reported on 2022)       No current facility-administered medications for this visit. ALLERGIES:    Allergies   Allergen Reactions    Morphine Other (See Comments)     \"makes me mean\"       SH:  Social History     Tobacco Use    Smoking status: Former     Types: Cigarettes     Quit date: 10/11/2004     Years since quittin.8    Smokeless tobacco: Never   Substance Use Topics    Alcohol use:  Yes     Alcohol/week: 0.0 standard drinks    Drug use: No       FH:  Family History   Problem Relation Age of Onset    Cancer Paternal Uncle         leukemia    Hypertension Maternal Grandfather     Cancer Father         Lung cancer - smoker    Glaucoma Maternal Grandmother     Cancer Paternal Grandmother         lung cancer - smoker          Physical Exam:     BP (!) 154/81   Pulse (!) 117   Ht 6' (1.829 m)   Wt (!) 376 lb (170.6 kg)   BMI 50.99 kg/m²     General:  Well-developed, well-nourished, no distress. Psych:  Cooperative, good insight and judgement. Neuro:  Alert, oriented to person, place and time. Lungs:  Unlabored breathing. Symmetrical chest expansion. Heart:  Regular rate and rhythm. Abdomen:  Soft, obese, non-tender, non-distended. No guarding or rebound. No palpable masses. Labs: All recent labs were reviewed. Lab Results   Component Value Date    LABA1C 6.3 (H) 07/07/2022     No results found for: TSHFT4, TSH   Lab Results   Component Value Date    VITD25 36.8 07/07/2022      Lab Results   Component Value Date    VDJJQESH42 831 07/07/2022      Lab Results   Component Value Date    IRON 47 07/07/2022    FERRITIN 61 07/07/2022        Imaging: All images were independently reviewed by me. Diagnosis Orders   1. Morbid obesity ([facility] [address]        2. Obesity, morbid, BMI 50 or higher ([facility] [address]        3. RACHEL (obstructive sleep apnea)        4. Controlled type 2 diabetes mellitus without complication, without long-term current use of insulin (HCC)        5. Varicose veins of both lower extremities with inflammation        6. Peptic ulcer        7. Hip arthritis        8. Gastroesophageal reflux disease with esophagitis without hemorrhage        9. Scruggs's esophagus without dysplasia        10. Dietary counseling        11. Exercise counseling            Assessment/Plan:    Radha Friend is a 61 y.o. male is here for monthly follow-up visit prior to bariatric surgery with medical co-morbidities related to morbid obesity. Patient is a fair candidate for bariatric surgery and meets NIH criteria for weight loss surgery. In detail, discussed risks and benefits of laparoscopic sleeve gastrectomy. Reviewed information and expectations regarding the pre-operative period, the bariatric procedure, and postoperative period.  Stressed with patient importance of understanding bariatric surgery is a tool and will not work if patient does not continue with healthy lifestyle changes including regular exercise and high protein, low calorie, low carb diet. Patient was seen by the dietitian today for continued evaluation and management regarding lifestyle and dietary changes. Reviewed assessment and plan in detail. Patient verbalized understanding. Questions answered. Recommendations: Continue to work on dietary recommendations over the next couple of weeks, follow up in one month. Complete remaining work up requirements including EGD. He may contact the office to schedule his pre-operative appointment after the EGD is complete. Matt Holder PA-C  Bariatric Surgery  8/1/2022    Counseling time:counseling time more than 50% of visit: 45 minutes: I spent this time preparing to see patient (including chart review and preparation), obtaining and/or reviewing additional medical history, performing a physical exam and evaluation, documenting clinical information in the electronic health record, independently interpreting results, communicating results to patient, family or caregiver, and/or coordinating care.

## 2022-08-01 NOTE — PROGRESS NOTES
Laci Valero, MS, RD, LD  Surgical Weight Loss Dietitian  [address] [facility], [address] [place] [address]  Katharina Mosqueda  Phone [phone]   Fax [phone]    MWL INITIAL ASSESSMENT    Nutrition Assessment:  Anthropometrics:    Ht: 60, wt: 376#, 204% IBW, 50.99 BMI  Pt has lost 4 lbs since last visit. Co-morbidities: RACHEL, DM, GERD, OA, Joint pain, Scruggs's esophagus, Hx of gastric ulcers, Back pain    Labs:   Vit D, 25-Hydroxy (ng/mL)   Date Value   07/07/2022 36.8     Folate (ng/mL)   Date Value   07/07/2022 17.8 (H)     Iron (ug/dL)   Date Value   07/07/2022 47     Ferritin (NG/ML)   Date Value   07/07/2022 61       Macronutrient needs assessment   EER: 3627-0557 kcal/d (14-16 kcal/kg ABW)    MSJ x 1.3-500 = 2837 kcal/d (sedentary AF)   EPR: 81-121g pro (1.0-1.5 gm pro/kg IBW)   CHO: ~355 g CHO/d (50% EER, ~7-8 servings CHO/meal)   H2O: 1mL/kcal or per MD recommendations     Support:  Pt has told wife, mother - to be good support. Reminded pt about SWL support group and online support group. Motivation:  High. Onset of obesity: Childhood   Potential triggers to weight gain: stress, depression   Hx of obesity in family members include paternal great uncle, cousins. History of Weight loss attempts, goal >10# weight loss:    Prior bariatric programs: none    Commercial programs: none    Registered Dietitian visits: yes, SWL consult at [facility] Weight Loss programs, including medications: phentermine, others could not recall name, hormones    Self-supervised diets and exercise: atkins, light/moderate exercise, low-fat, cabbage diet, apple cider vinegar/cayenne/lemon juice diet   Pt has attempted weight loss via modalities listed above - ~20+ attempts made, all without any permanent weight loss. Pt goal in pursuing SWL is decrease weight for hip replacement sx.       Eating Habits:   Eating occasions/d: 3 meals and snacks prn  Main cook at home: pt  Restaurant/Fast Food Intake: 1x/week  Food Allergies: lactose intolerance  Cultural Preferences: none  Typical Beverage Consumption: water, sf tea, limiting coffee, occasional soda, occasional alcohol  Diet Recall:   Breakfast: protien shake  Snack: granola bar  Lunch: sandwich  Dinner: protein bar  Snack: dry cereal, protein bar  Beverages: water, sf tea  Feeling after eating meals: Comfortable   Eating mindfully: Yes   Drinking after meals: Yes, currently waiting 30+ minutes   Elimination of sugary/carbonated/caffeinated/alcoholic beverages: practicing, eliminating caffeine over next week   Drinking without straws: Yes    Lifestyle Assessment:    Hours of sleep/night: ~3-4   Current Occupation: [employer] technician   Activity during day: sedentary   Type of Housing: house    Number of people living in house and influence: 2 including, pt and wife - to be good influence. Exercise Assessment:   PAR-Q: modified exercises for bilateral hip pain, back pain   Exercise equipment at home: bike, rowing machine, resistance bands, weights   Gym Membership: none  Medical:     Pain or injuries (present): bilateral hips, back, neck, shoulder R    Past surgeries related to mobility: none  Current Exercise Routine: resistance bands, stretching, walking daily for 20 minutes  Assessment Exercise Goal: continue with current exercise routine and increase as tolerated    Nutrition Diagnosis:  Morbid obesity R/T excessive energy intake and yoyo dieting as evidenced by BMI = 50.99 and 204 % IBW. Nutrition Intervention:   Diet Rx - Low-moderate calorie intake (~2800 kcal/day). Work on eating 3 meals/d and meal balance. Modify distribution, type or amount of food and nutrients within meals or at a specified time-    Discussed need for 3 meals per day and snacks based on body size. Explained macronutrients and emphasized mindful eating behaviors. Discussed meal priority and encouraged practice.    Educated on protein counting, Protein supplements, and MVI supplements for after sx and encouraged practice. Discussed effect of sugary, carbonated, caffeinated, alcoholic beverages on the body and the need for eventual elimination. Pt goal to increasing hydrating fluid intake. *Pt verbalizes understanding of information provided during this session. Nutrition Monitoring and Evaluation:   Monitor for understanding of diet and exercise rx. Follow up for practicing mindful eating behaviors and meal priority. Follow for elimination of sugary, carbonated, caffeinated, alcoholic beverages. F/U per MD    Impression:      Readiness to learn and change: Fair   Comprehension level: Moderate   Anticipated compliance: Moderate     Fair SWL candidate at this time. Pt has made good changes during supervised diet with RD. RD feels as though pt will be able to adhere to post-operative instructions and plan of care. Pt understands the habits that must be in place to support the SWL tool. I look forward to continuing to work with pt.      Nancy Huitron, MS, RD, LD

## 2022-08-02 ENCOUNTER — OFFICE VISIT (OUTPATIENT)
Dept: BEHAVIORAL/MENTAL HEALTH CLINIC | Facility: CLINIC | Age: 57
End: 2022-08-02
Payer: COMMERCIAL

## 2022-08-02 DIAGNOSIS — F54 PSYCHOLOGICAL AND BEHAVIORAL FACTORS ASSOCIATED WITH DISORDERS OR DISEASES CLASSIFIED ELSEWHERE: Primary | ICD-10-CM

## 2022-08-02 DIAGNOSIS — E66.01 OBESITY, MORBID, BMI 40.0-49.9 (HCC): ICD-10-CM

## 2022-08-02 PROCEDURE — 90834 PSYTX W PT 45 MINUTES: CPT | Performed by: SOCIAL WORKER

## 2022-08-02 NOTE — PROGRESS NOTES
BARIATRIC FOLLOW UP NOTE  NAME: Huyen Cheema    DATE: 8/2/2022    TYPE OF SERVICE: Individual Treatment    LOCATION OF SERVICE: [facility]/ In person visit at office    DURATION: 50 mins    DIAGNOSIS: :    1. Psychological and behavioral factors associated with disorders or diseases classified elsewhere    2. Obesity, morbid, BMI 40.0-49.9 ([facility] [address]        SURGERY STATUS: Approved    MENTAL STATUS EXAM:  Pt was appropriately groomed. Pt was 0x4. No unusual mannerisms were noted. No psychotic symptoms displayed by pt. Pt was cooperative and engaged in the session. Insight and judgment were intact. Denied suicidal or homicidal ideation. Perceptions were appropriate. Attention and concentration were normal.  No memory impairments were noted. Fund of knowledge was within normal limits. Speech pattern and language were intact. 0x4. Denied SI/HI. Mood/Affect: even with congruent affect  Thought Process: linear and coherent    Pt is progressing on goals. Pt is a 61 y.o. male who presents today for the second mandatory appointment with this SW following the initial psychological evaluation for bariatric surgery. The pt was approved for surgery. Assessment    Eating Behaviors: The pt reports eating behaviors have been going well. The pt has been eating protein and vegetables and  has been limiting carbohydrates. The pt has not been drinking with meals. The pt  has been trying to  eat three meals a day. Straws are not being used. Pt is attempting to practice mindful eating. Exercise: The pt has been doing stretching exercises and has been working with the exercise bands daily. Carbonation: None    Caffeine: The pt states he is down to one cup of coffee a day that he has Splenda and non dairy creamer. The pt used to drink about 40 ozs of coffee throughout the day so this is a big improvement. He does not sleep well due to pain which may contribute to his coffee habit.   He states he drinks coffee because he likes the taste. He states coffee does not prevent him from sleeping. Logging Food: The pt states he tracks what he eats in his head. He was informed about the [employer] danny. Support System:  The pt's support system remains intact. Medications: Pt is compliant with his medications. Alcohol/Drug/Nicotine Use: None    Post Surgery Behavioral Plan Completed: Yes    Clinical Narrative:Pt came in today for the 2-4 week mandatory bariatric follow up appointment. The behavioral plan was completed. Meal planning, exercise, dealing with holidays and vacations, coping skills, boundary setting and possible relationship issues were addressed. Information on urge surfing was given. Attuned movement and eating were also addressed. The ACT concept of acceptance was also discussed. The pt plans to meet his PCP to address his sleep issues. His medication has not been working well. This was reinforced. A regulated sleep pattern will help the pt develop a routine in the day. He has pain due to his hips but he also states his job is very stressful and this contributes to his sleep issues as well. Plan: Pt encouraged to attend the supportive group. Continued adherence to the bariatric regimen was reinforced. Outpatient therapy appointments were offered if needed in the future.     Follow Up: PRN

## 2022-08-03 NOTE — H&P
Radha Arzola PA-C  Bariatric & Advanced Laparoscopic Surgery & Endoscopy  [address] [place] [address] 2050, 1632 [facility]  Katharina Mosqueda  Phone [phone]   Fax [phone]    Date of visit: 8/3/2022      Primary/Requesting provider: Simran Leonard MD         Name: Renea Allen      MRN: [id number]       : 1965       Age: 61 y.o. Sex: male        PCP: Simran Leonard MD     CC:  Bariatric monthly dietary follow up    Surgeon: Dr. Kathy Martinez. Patricio    Procedure: laparoscopic sleeve gastrectomy    Surgical Consult Date: 2022    The patient is a 61 y.o. male presenting with a height of 6' (1.829 m) and weight of (!) 376 lb (170.6 kg), giving him a There is no height or weight on file to calculate BMI. He has an ideal body weight of 184 lbs, and excess body weight of 192 lbs. He started our program with a weight of 380 lbs, lost 4lbs. He is in the process of completing all aspects of our prep program and to be deemed an acceptable candidate for bariatric surgery. Patient has a long term history of obesity with multiple failed attempts at weight reduction. Patient denies any changes in health history or physical health since last visit. Patient has been adherent to his diet and exercise regimen. Patient feels that he is well informed regarding his bariatric surgery choice and would like to proceed with a laparoscopic sleeve gastrectomy for weight reduction, improvement of his medical conditions, and improved quality of life. Patient verbalized understanding of the risks associated with bariatric surgery. Patient also verbalized understanding that bariatric surgery is a tool and that weight reduction is dependent on behavioral changes in regards to what he eats and how much.     NUTRITION ASSESSMENT:   Diet Recall:   Breakfast: protien shake  Snack: granola bar  Lunch: sandwich  Dinner: protein bar  Snack: dry cereal, protein bar  Beverages: water, sf tea  Habits:   Eating 3x/day: yes  Elimination of caffeine and carbonated beverages: yes  Practicing not drinking with meals: yes     EXERCISE ASSESSMENT: Pt is currently exercising via resistance bands, stretching, walking daily for 20 minutes. NIH Guidelines reviewed with pt. PSYCHOLOGICAL EVALUATION:  In progress, 07/20/2022 with Florentin Wilson deeming him an appropriate surgical candidate. DIETITIAN EVALUATION:  In progress with Clayida Essex RD, LD  BARIATRIC LABS:  Completed, 07/07/2022 (A1C 6.3, folate 17.8)  UPPER GI:  Completed, 07/07/2022  Small hiatal hernia. No reflux demonstrated during the course of the   exam. Otherwise unremarkable.    EGD:  Scheduled for 08/05/2022  PHYSICAL THERAPY EVALUATION FOR MOBILITY OPTIMIZATION:  Completed, 07/11/2022    MEDICAL HISTORY:  Morbid Obesity  Scruggs's esophagus  Hx of gastric ulcers  Prostate problems  Lymphedema  Back pain  Kidney stones     Comorbidity Yes or No   Obstructive Sleep Apnea  CPAP/BIPAP use= Yes Yes   Diabetes Mellitus, non-insulin Yes   Hypertension No   Gastroesophageal Reflux  Treatment Med= Prilosec Yes   Hyperlipidemia No   Coronary Artery Disease No   Cancer No   Asthma No   Osteoarthritis Yes   Joint Pain Yes         Other Yes or No   Venous Stasis Yes   Dialysis No   Long term use of steroids or immunocompromised conditions No   On Anticoagulants No      PMH:  Past Medical History:   Diagnosis Date    Arthritis     osteo - in back    Chronic pain     spinal stenosis--- neck and lower restriction    GERD (gastroesophageal reflux disease)     occ-daily med    Ill-defined disease     swelling of both lower legs- SCIATICA- legs feel like \"burning\"    Morbid obesity (HCC)     BMI 49.6    Other ill-defined conditions(799.89)     stuffy nose/ infection    Other ill-defined conditions(799.89)     spinal stenosis    Other ill-defined conditions(799.89)     several kidney stones-passed on own    Peptic ulcer 10/11/2013    Unspecified sleep apnea     cpap- bring dos PSH:  Past Surgical History:   Procedure Laterality Date    APPENDECTOMY      HEENT      sinus surgery; nasal polyps removed - Dr. Thu Gonzales      t/a       MEDS:  No current facility-administered medications for this encounter. Current Outpatient Medications   Medication Sig Dispense Refill    betamethasone dipropionate 0.05 % lotion       fluticasone (CUTIVATE) 0.05 % cream       hydroCHLOROthiazide (HYDRODIURIL) 12.5 MG tablet       ketoconazole (NIZORAL) 2 % shampoo       oxyCODONE (OXY-IR) 30 MG immediate release tablet Take 30 mg by mouth every 8 hours as needed. sertraline (ZOLOFT) 50 MG tablet Take 50 mg by mouth in the morning. SUCRALFATE PO Take by mouth      Ascorbic Acid (VITAMIN C CR) 1000 MG TBCR Take by mouth      Diclofenac Sodium 1.5 % SOLN 40 drops to a knee 4 times a day      DULoxetine (CYMBALTA) 30 MG extended release capsule Take 60 mg by mouth 2 times daily       fluticasone (FLONASE) 50 MCG/ACT nasal spray 2 sprays by Nasal route daily      Glucosamine 500 MG CAPS Take 500 mg by mouth 3 times daily      Magnesium 500 MG CAPS Take by mouth      mirtazapine (REMERON) 30 MG tablet Take 30 mg by mouth      naproxen (EC NAPROSYN) 500 MG EC tablet Take 500 mg by mouth 2 times daily (with meals)      omeprazole (PRILOSEC) 40 MG delayed release capsule Take 40 mg by mouth 2 times daily      tamsulosin (FLOMAX) 0.4 MG capsule TAKE 1 CAPSULE NIGHTLY      temazepam (RESTORIL) 30 MG capsule Take 30 mg by mouth. (Patient not taking: Reported on 2022)         ALLERGIES:    Allergies   Allergen Reactions    Bee Venom Shortness Of Breath and Swelling     Carried epi pen    Wasp Venom Protein Anaphylaxis    Morphine Other (See Comments)     \"makes me mean\"  Other reaction(s):  Altered Mental State-Intolerance       SH:  Social History     Tobacco Use    Smoking status: Former     Types: Cigarettes     Quit date: 10/11/2004     Years since quittin.8    Smokeless tobacco: Never Substance Use Topics    Alcohol use: Yes     Alcohol/week: 0.0 standard drinks    Drug use: No       FH:  Family History   Problem Relation Age of Onset    Cancer Paternal Uncle         leukemia    Hypertension Maternal Grandfather     Cancer Father         Lung cancer - smoker    Glaucoma Maternal Grandmother     Cancer Paternal Grandmother         lung cancer - smoker          Physical Exam:     There were no vitals taken for this visit. General:  Well-developed, well-nourished, no distress. Psych:  Cooperative, good insight and judgement. Neuro:  Alert, oriented to person, place and time. Lungs:  Unlabored breathing. Symmetrical chest expansion. Heart:  Regular rate and rhythm. Abdomen:  Soft, obese, non-tender, non-distended. No guarding or rebound. No palpable masses. Labs: All recent labs were reviewed. Lab Results   Component Value Date    LABA1C 6.3 (H) 07/07/2022     No results found for: TSHFT4, TSH   Lab Results   Component Value Date    VITD25 36.8 07/07/2022      Lab Results   Component Value Date    RGBTDKWV96 831 07/07/2022      Lab Results   Component Value Date    IRON 47 07/07/2022    FERRITIN 61 07/07/2022        Imaging: All images were independently reviewed by me. Diagnosis Orders   1. Morbid obesity ([address]        2. Obesity, morbid, BMI 50 or higher ([facility] [address]        3. RACHEL (obstructive sleep apnea)        4. Controlled type 2 diabetes mellitus without complication, without long-term current use of insulin (HCC)        5. Varicose veins of both lower extremities with inflammation        6. Peptic ulcer        7. Hip arthritis        8. Gastroesophageal reflux disease with esophagitis without hemorrhage        9. Scruggs's esophagus without dysplasia        10. Dietary counseling        11.  Exercise counseling            Assessment/Plan:    Kb Perea is a 61 y.o. male is here for monthly follow-up visit prior to bariatric surgery with medical co-morbidities related to morbid obesity. Patient is a fair candidate for bariatric surgery and meets NIH criteria for weight loss surgery. In detail, discussed risks and benefits of laparoscopic sleeve gastrectomy. Reviewed information and expectations regarding the pre-operative period, the bariatric procedure, and postoperative period. Stressed with patient importance of understanding bariatric surgery is a tool and will not work if patient does not continue with healthy lifestyle changes including regular exercise and high protein, low calorie, low carb diet. Patient was seen by the dietitian today for continued evaluation and management regarding lifestyle and dietary changes. Reviewed assessment and plan in detail. Patient verbalized understanding. Questions answered. Recommendations: Continue to work on dietary recommendations over the next couple of weeks, follow up in one month. Complete remaining work up requirements including EGD. He may contact the office to schedule his pre-operative appointment after the EGD is complete. Adwoa Anderson MD      Counseling time:counseling time more than 50% of visit: 45 minutes: I spent this time preparing to see patient (including chart review and preparation), obtaining and/or reviewing additional medical history, performing a physical exam and evaluation, documenting clinical information in the electronic health record, independently interpreting results, communicating results to patient, family or caregiver, and/or coordinating care.

## 2022-08-04 RX ORDER — ONDANSETRON 2 MG/ML
4 INJECTION INTRAMUSCULAR; INTRAVENOUS
Status: CANCELLED | OUTPATIENT
Start: 2022-08-04 | End: 2022-08-04

## 2022-08-04 RX ORDER — LABETALOL HYDROCHLORIDE 5 MG/ML
10 INJECTION, SOLUTION INTRAVENOUS
Status: CANCELLED | OUTPATIENT
Start: 2022-08-04

## 2022-08-04 RX ORDER — DEXTROSE MONOHYDRATE 100 MG/ML
INJECTION, SOLUTION INTRAVENOUS CONTINUOUS PRN
Status: CANCELLED | OUTPATIENT
Start: 2022-08-04

## 2022-08-04 NOTE — PERIOP NOTE
Dear Mr. Naranjo,      Thank you for completing your phone assessment with me today. Here are your requested procedure instructions. Please call #[phone] with any questions/concerns. Your procedure is scheduled at [address] [place], [place] [id number]. Please arrive at MAIN Entrance; GI Lab (#[phone]) will call you on the business day before your surgery with your arrival time. If you have any questions on the day of procedure, please call the GI dept. at the telephone number above. Follow procedure instructions for nothing by mouth after midnight or colonoscopy prep received from the GI/Surgeon office. Please take these medications on the morning of the procedure with a small sip of water: duloxetine, omeprazole. Please stop all vitamins/supplements 7 days prior to the procedure and stop all NSAIDS (ibuprofen, naproxen, aleve, motrin, advil) 5 days before your procedure. A responsible adult must drive you to the hospital, remain in the building during the procedure and you will need adult supervision for 24 hours after anesthesia. Please use a non-moisturizing soap the night before the procedure and on the morning of the procedure. Do NOT wear: make-up, nail polish, lotions, cologne, perfumes, powders or oil on your skin. All piercings/metal/jewelry must be removed prior to arrival.  If you wear contacts then you will need to bring a case to store them in or wear your glasses. Deodorant is acceptable with all EGDs and/or colonoscopies. Medication given during procedure may cause drowsiness for several hours, therefore, do not drive or operate machinery for remainder of the day. You may not drink alcohol on the day of your procedure, please resume regular diet and activity unless otherwise directed.      For EGD: For mild sore throat you may use Cepacol throat lozenges or warm salt water gargles as needed, call your physician for any problems or

## 2022-08-04 NOTE — PERIOP NOTE
Patient verified name, , and procedure. Type: 1a; abbreviated assessment per anesthesia guidelines  Labs per surgeon: Unknown  Labs per anesthesia: None per protocol      Instructed pt that they will be notified by the Gi Lab for time of arrival. If any questions please call the GI lab at [phone]. Follow diet and prep instructions per office. May have clear liquids until 4 hours prior to time of arrival.    Vernal Roxo or shower the night before and the am of surgery with antibacterial soap. No lotions, oils, powders, cologne on skin. No make up, eye make up or jewelry. Wear loose fitting comfortable, clean clothing. Must have adult present in building the entire time . Medications for the day of procedure: duloxetine, omeprazole    The following discharge instructions reviewed with patient: medication given during procedure may cause drowsiness for several hours, therefore, do not drive or operate machinery for remainder of the day, no alcohol on the day of your procedure, resume regular diet and activity unless otherwise directed, for mild sore throat you may use Cepacol throat lozenges or warm salt water gargles as needed, call your physician for any problems or questions. Patient verbalizes understanding.

## 2022-08-05 ENCOUNTER — HOSPITAL ENCOUNTER (OUTPATIENT)
Age: 57
Setting detail: OUTPATIENT SURGERY
Discharge: HOME | End: 2022-08-05
Attending: SURGERY | Admitting: SURGERY
Payer: COMMERCIAL

## 2022-08-05 ENCOUNTER — ANESTHESIA EVENT (OUTPATIENT)
Dept: ENDOSCOPY | Age: 57
End: 2022-08-05
Payer: COMMERCIAL

## 2022-08-05 ENCOUNTER — ANESTHESIA (OUTPATIENT)
Dept: ENDOSCOPY | Age: 57
End: 2022-08-05
Payer: COMMERCIAL

## 2022-08-05 VITALS
TEMPERATURE: 98 F | BODY MASS INDEX: 42.66 KG/M2 | SYSTOLIC BLOOD PRESSURE: 125 MMHG | HEART RATE: 70 BPM | RESPIRATION RATE: 18 BRPM | HEIGHT: 72 IN | WEIGHT: 315 LBS | DIASTOLIC BLOOD PRESSURE: 62 MMHG | OXYGEN SATURATION: 99 %

## 2022-08-05 PROBLEM — Z87.11 PERSONAL HISTORY OF PEPTIC ULCER DISEASE: Status: ACTIVE | Noted: 2022-08-05

## 2022-08-05 PROCEDURE — 2709999900 HC NON-CHARGEABLE SUPPLY: Performed by: SURGERY

## 2022-08-05 PROCEDURE — 43239 EGD BIOPSY SINGLE/MULTIPLE: CPT | Performed by: SURGERY

## 2022-08-05 PROCEDURE — 6360000002 HC RX W HCPCS

## 2022-08-05 PROCEDURE — 3700000001 HC ADD 15 MINUTES (ANESTHESIA): Performed by: SURGERY

## 2022-08-05 PROCEDURE — 88305 TISSUE EXAM BY PATHOLOGIST: CPT

## 2022-08-05 PROCEDURE — 7100000010 HC PHASE II RECOVERY - FIRST 15 MIN: Performed by: SURGERY

## 2022-08-05 PROCEDURE — 3700000000 HC ANESTHESIA ATTENDED CARE: Performed by: SURGERY

## 2022-08-05 PROCEDURE — 2500000003 HC RX 250 WO HCPCS

## 2022-08-05 PROCEDURE — 7100000011 HC PHASE II RECOVERY - ADDTL 15 MIN: Performed by: SURGERY

## 2022-08-05 PROCEDURE — 88312 SPECIAL STAINS GROUP 1: CPT

## 2022-08-05 PROCEDURE — 2580000003 HC RX 258: Performed by: ANESTHESIOLOGY

## 2022-08-05 PROCEDURE — 3609012400 HC EGD TRANSORAL BIOPSY SINGLE/MULTIPLE: Performed by: SURGERY

## 2022-08-05 RX ORDER — SODIUM CHLORIDE 0.9 % (FLUSH) 0.9 %
5-40 SYRINGE (ML) INJECTION PRN
Status: DISCONTINUED | OUTPATIENT
Start: 2022-08-05 | End: 2022-08-05 | Stop reason: HOSPADM

## 2022-08-05 RX ORDER — LIDOCAINE HYDROCHLORIDE 10 MG/ML
1 INJECTION, SOLUTION INFILTRATION; PERINEURAL
Status: DISCONTINUED | OUTPATIENT
Start: 2022-08-05 | End: 2022-08-05 | Stop reason: HOSPADM

## 2022-08-05 RX ORDER — PROPOFOL 10 MG/ML
INJECTION, EMULSION INTRAVENOUS PRN
Status: DISCONTINUED | OUTPATIENT
Start: 2022-08-05 | End: 2022-08-05 | Stop reason: SDUPTHER

## 2022-08-05 RX ORDER — GLYCOPYRROLATE 0.2 MG/ML
INJECTION INTRAMUSCULAR; INTRAVENOUS PRN
Status: DISCONTINUED | OUTPATIENT
Start: 2022-08-05 | End: 2022-08-05 | Stop reason: SDUPTHER

## 2022-08-05 RX ORDER — MIDAZOLAM HYDROCHLORIDE 2 MG/2ML
2 INJECTION, SOLUTION INTRAMUSCULAR; INTRAVENOUS
Status: DISCONTINUED | OUTPATIENT
Start: 2022-08-05 | End: 2022-08-05 | Stop reason: HOSPADM

## 2022-08-05 RX ORDER — TRISODIUM CITRATE DIHYDRATE AND CITRIC ACID MONOHYDRATE 500; 334 MG/5ML; MG/5ML
30 SOLUTION ORAL
Status: DISCONTINUED | OUTPATIENT
Start: 2022-08-05 | End: 2022-08-05 | Stop reason: HOSPADM

## 2022-08-05 RX ORDER — SODIUM CHLORIDE 0.9 % (FLUSH) 0.9 %
5-40 SYRINGE (ML) INJECTION EVERY 12 HOURS SCHEDULED
Status: DISCONTINUED | OUTPATIENT
Start: 2022-08-05 | End: 2022-08-05 | Stop reason: HOSPADM

## 2022-08-05 RX ORDER — SODIUM CHLORIDE, SODIUM LACTATE, POTASSIUM CHLORIDE, CALCIUM CHLORIDE 600; 310; 30; 20 MG/100ML; MG/100ML; MG/100ML; MG/100ML
INJECTION, SOLUTION INTRAVENOUS CONTINUOUS
Status: DISCONTINUED | OUTPATIENT
Start: 2022-08-05 | End: 2022-08-05 | Stop reason: HOSPADM

## 2022-08-05 RX ORDER — LIDOCAINE HYDROCHLORIDE 20 MG/ML
INJECTION, SOLUTION EPIDURAL; INFILTRATION; INTRACAUDAL; PERINEURAL PRN
Status: DISCONTINUED | OUTPATIENT
Start: 2022-08-05 | End: 2022-08-05 | Stop reason: SDUPTHER

## 2022-08-05 RX ORDER — PROPOFOL 10 MG/ML
INJECTION, EMULSION INTRAVENOUS CONTINUOUS PRN
Status: DISCONTINUED | OUTPATIENT
Start: 2022-08-05 | End: 2022-08-05 | Stop reason: SDUPTHER

## 2022-08-05 RX ORDER — SODIUM CHLORIDE 9 MG/ML
INJECTION, SOLUTION INTRAVENOUS PRN
Status: DISCONTINUED | OUTPATIENT
Start: 2022-08-05 | End: 2022-08-05 | Stop reason: HOSPADM

## 2022-08-05 RX ADMIN — PROPOFOL 20 MG: 10 INJECTION, EMULSION INTRAVENOUS at 09:09

## 2022-08-05 RX ADMIN — GLYCOPYRROLATE 0.2 MG: 0.2 INJECTION, SOLUTION INTRAMUSCULAR; INTRAVENOUS at 09:02

## 2022-08-05 RX ADMIN — LIDOCAINE HYDROCHLORIDE 100 MG: 20 INJECTION, SOLUTION EPIDURAL; INFILTRATION; INTRACAUDAL; PERINEURAL at 09:06

## 2022-08-05 RX ADMIN — PROPOFOL 180 MCG/KG/MIN: 10 INJECTION, EMULSION INTRAVENOUS at 09:06

## 2022-08-05 RX ADMIN — SODIUM CHLORIDE, POTASSIUM CHLORIDE, SODIUM LACTATE AND CALCIUM CHLORIDE: 600; 310; 30; 20 INJECTION, SOLUTION INTRAVENOUS at 08:09

## 2022-08-05 RX ADMIN — PROPOFOL 20 MG: 10 INJECTION, EMULSION INTRAVENOUS at 09:08

## 2022-08-05 RX ADMIN — PROPOFOL 30 MG: 10 INJECTION, EMULSION INTRAVENOUS at 09:07

## 2022-08-05 RX ADMIN — PROPOFOL 50 MG: 10 INJECTION, EMULSION INTRAVENOUS at 09:06

## 2022-08-05 ASSESSMENT — PAIN - FUNCTIONAL ASSESSMENT: PAIN_FUNCTIONAL_ASSESSMENT: NONE - DENIES PAIN

## 2022-08-05 NOTE — INTERVAL H&P NOTE
Update History & Physical    The patient's History and Physical of 8/3/22 was reviewed with the patient and I examined the patient. There was no change. The surgical site was confirmed by the patient and me. Plan: The risks, benefits, expected outcome, and alternative to the recommended procedure have been discussed with the patient. Patient understands and wants to proceed with the procedure.      Electronically signed by Trudy Null MD on 8/5/2022 at 7:24 AM

## 2022-08-05 NOTE — OP NOTE
[address] [facility]  OPERATIVE REPORT    Name:  Mercy Corona  MR#:  [id number]  :  1965  ACCOUNT #:  [de-identified]  DATE OF SERVICE:  2022    PREOPERATIVE DIAGNOSIS:  Morbid obesity with a history of peptic ulcer disease and Scruggs's esophagus without dysplasia, who wants to proceed with bariatric surgery. POSTOPERATIVE DIAGNOSES:  1.  Small hiatal hernia. 2.  Scruggs's esophagus noted, essentially unchanged. 3.  Gastritis. 4.  No evidence of ulcerations. PROCEDURE PERFORMED:  EGD with biopsies. SURGEON:  James Munson MD    ASSISTANT:  None. ANESTHESIA:  Monitored anesthesia care with Dr. Zander Freedman. COMPLICATIONS:  None. SPECIMENS REMOVED:  Gastric biopsies x2, sent to Pathology. IMPLANTS:  None. ESTIMATED BLOOD LOSS:  None. DRAINS:  None. HISTORY:  A 67-year-old male, who came to [address] [facility] of his own volition for consideration of bariatric surgery. During his workup, he had noted a history of peptic ulcer disease in the past as well as Scruggs's esophagus. He has had this biopsied multiple times and there has been no evidence of dysplasia. I recommended that he have an EGD to evaluate for any ulcerations prior to proceeding with bariatric surgery. He was agreeable. I went through risks of bleeding, infection, anesthesia, perforation, esophagus, stomach, small bowel. The patient signed a consent form, was scheduled for 2022. The patient was taken to the endo fluoro room due to his BMI of greater than 50 plus the fact that he had a Mallampati score of 4. Dr. Zander Freedman, the anesthesiologist felt that it was the safest situation to do the procedure in the endo fluoro room. He had one of the oxygen masks and a bite guard applied. A time-out was done identifying the patient, surgeon, procedure and his birth date of 1965 . When everyone in the room agreed, monitored anesthesia care was done.   He remains

## 2022-08-05 NOTE — ANESTHESIA PRE PROCEDURE
Department of Anesthesiology  Preprocedure Note       Name:  Hawa Carpenter   Age:  61 y.o.  :  1965                                          MRN:  [id number]         Date:  2022      Surgeon: Nicholas Peña):  Serene Payne MD    Procedure: Procedure(s):  EGD ESOPHAGOGASTRODUODENOSCOPY/51    Medications prior to admission:   Prior to Admission medications    Medication Sig Start Date End Date Taking? Authorizing Provider   aspirin 325 MG EC tablet Take 325 mg by mouth every 4 hours as needed    Historical Provider, MD   betamethasone dipropionate 0.05 % lotion  22   Historical Provider, MD   fluticasone (CUTIVATE) 0.05 % cream  22   Historical Provider, MD   hydroCHLOROthiazide (HYDRODIURIL) 12.5 MG tablet Take 12.5 mg by mouth in the morning. 22   Historical Provider, MD   ketoconazole (NIZORAL) 2 % shampoo  22   Historical Provider, MD   oxyCODONE (OXY-IR) 30 MG immediate release tablet Take 30 mg by mouth every 8 hours as needed. Patient not taking: Reported on 2022    Historical Provider, MD   sertraline (ZOLOFT) 50 MG tablet Take 50 mg by mouth in the morning.   Patient not taking: Reported on 2022    Historical Provider, MD   SUCRALFATE PO Take by mouth as needed    Ar Automatic Reconciliation   Ascorbic Acid (VITAMIN C CR) 1000 MG TBCR Take by mouth    Ar Automatic Reconciliation   Diclofenac Sodium 1.5 % SOLN 40 drops to a knee 4 times a day 21   Ar Automatic Reconciliation   DULoxetine (CYMBALTA) 30 MG extended release capsule Take 60 mg by mouth 2 times daily  21   Ar Automatic Reconciliation   fluticasone (FLONASE) 50 MCG/ACT nasal spray 2 sprays by Nasal route as needed 21   Ar Automatic Reconciliation   Glucosamine 500 MG CAPS Take 500 mg by mouth 3 times daily    Ar Automatic Reconciliation   Magnesium 500 MG CAPS Take by mouth    Ar Automatic Reconciliation   mirtazapine (REMERON) 30 MG tablet Take 30 mg by mouth nightly 10/13/21   Ar Automatic Reconciliation   naproxen (EC NAPROSYN) 500 MG EC tablet Take 500 mg by mouth as needed    Ar Automatic Reconciliation   omeprazole (PRILOSEC) 40 MG delayed release capsule Take 40 mg by mouth 2 times daily 10/13/21   Ar Automatic Reconciliation   tamsulosin (FLOMAX) 0.4 MG capsule TAKE 1 CAPSULE NIGHTLY 12/17/21   Ar Automatic Reconciliation   temazepam (RESTORIL) 30 MG capsule Take 30 mg by mouth. 10/13/21   Ar Automatic Reconciliation       Current medications:    No current facility-administered medications for this encounter. Allergies: Allergies   Allergen Reactions    Bee Venom Shortness Of Breath and Swelling     Carried epi pen    Wasp Venom Protein Anaphylaxis    Morphine Other (See Comments)     \"makes me mean\"  Other reaction(s): Altered Mental State-Intolerance       Problem List:    Patient Active Problem List   Diagnosis Code    Low serum testosterone level R79.89    Sleep apnea G47.30    Body mass index (BMI) 50.0-59.9, adult ([facility]) Z68.43    Neuropathy of both feet G57.93    Controlled type 2 diabetes mellitus without complication, without long-term current use of insulin ([facility]) E11.9    Varicose veins of both lower extremities with inflammation I83.11, I83.12    Nausea in adult R11.0    Gout M10.9    Diastasis of rectus abdominis M62.08    H/O prostatitis Z87.438    DDD (degenerative disc disease), lumbar M51.36    Obesity, morbid ([address] E66.01    Multiple polyps of ethmoid sinus J33.8    Spinal stenosis M48.00    Restless leg G25.81    Type 2 diabetes mellitus with diabetic neuropathy ([facility]) E11.40    Depression F32. A    Hip arthritis M16.10    Peptic ulcer K27.9    Anxiety F41.9    Arthritis of right acromioclavicular joint M19.011    Bilateral hip joint arthritis M16.0    Lumbar radiculopathy, chronic M54.16    Reflux [id number]    Subacromial impingement, right M75.41       Past Medical History:        Diagnosis Date    Arthritis     osteo - in back    Chronic pain     spinal stenosis--- neck and lower restriction    GERD (gastroesophageal reflux disease)     Ill-defined disease     swelling of both lower legs- SCIATICA- legs feel like \"burning\"    Insomnia     Morbid obesity (HCC)     BMI 49.6    Other ill-defined conditions(799.89)     stuffy nose/ infection    Other ill-defined conditions(799.89)     spinal stenosis    Other ill-defined conditions(799.89)     several kidney stones-passed on own    Peptic ulcer 10/11/2013    Peripheral neuropathy     cymbalta    Unspecified sleep apnea     cpap- bring dos       Past Surgical History:        Procedure Laterality Date    APPENDECTOMY      COLONOSCOPY      ENDOSCOPY, COLON, DIAGNOSTIC      HEENT      sinus surgery; nasal polyps removed - Dr. Buzz Braxton      t/a       Social History:    Social History     Tobacco Use    Smoking status: Former     Types: Cigarettes     Quit date: 10/11/2004     Years since quittin.8    Smokeless tobacco: Never   Substance Use Topics    Alcohol use: Yes     Comment: drink every two months or so                                Counseling given: Not Answered      Vital Signs (Current):   Vitals:    22 0800   Weight: (!) 376 lb (170.6 kg)   Height: 6' (1.829 m)                                              BP Readings from Last 3 Encounters:   22 (!) 154/81   22 138/87   22 138/77       NPO Status:                                                                                 BMI:   Wt Readings from Last 3 Encounters:   22 (!) 376 lb (170.6 kg)   22 (!) 376 lb (170.6 kg)   22 (!) 380 lb (172.4 kg)     Body mass index is 50.99 kg/m².     CBC: No results found for: WBC, RBC, HGB, HCT, MCV, RDW, PLT    CMP:   Lab Results   Component Value Date/Time     2022 11:47 AM    K 4.2 2022 11:47 AM     2022 11:47 AM    CO2 24 2022 11:47 AM    BUN 20 2022 11:47 AM    CREATININE 0.75 2022 11:47 : arthritis: OA., .                 Abdominal:   (+) obese,           Vascular: Other Findings:           Anesthesia Plan      TIVA     ASA 3     (Procedure in Endo/flouro. Aggressive Preoxygenation and use of POM mask during procedure. Patient would be a difficult intubation)  Induction: intravenous. Anesthetic plan and risks discussed with patient.                         Med Rolon MD   8/5/2022

## 2022-08-05 NOTE — DISCHARGE INSTRUCTIONS
Gastrointestinal Esophagogastroduodenoscopy (EGD)/ Endoscopic Ultrasound(EUS)- Upper Exam Discharge Instructions    1. Call Dr. Forte Backer  for any problems or questions. ([phone])  2. Contact the doctor's office for follow up appointment as directed. 3. Medication may cause drowsiness for several hours, therefore, do not drive or operate machinery for remainder of the day. 4. No alcohol today. 5. Do not make any important decisions such as signing legal paperwork. 6. Ordinarily, you may resume regular diet and activity after exam unless otherwise specified by your physician. 7. For mild soreness in your throat you may use Cepacol throat lozenges or warm  salt-water gargles as needed. Any additional instructions:   - office will call in 1-2 weeks with pathology results  - Can proceed with bariatric surgery        Instructions given to Bear Pena and other family members.

## 2022-08-05 NOTE — PROGRESS NOTES
VSS. Discharge instructions reviewed with patient and mom and copy of instructions sent home with patient. Dr. Maxwell Nixon spoke with patient and mom prior to discharge. Questions answered. Discharged via car, wheeled out by staff. IV discontinued prior to discharge.

## 2022-08-05 NOTE — ANESTHESIA POSTPROCEDURE EVALUATION
Department of Anesthesiology  Postprocedure Note    Patient: Marley Coburn  MRN: [id number]  YOB: 1965  Date of evaluation: 8/5/2022      Procedure Summary     Date: 08/05/22 Room / Location: [facility] ENDO FLOURO 1 / [facility] ENDOSCOPY    Anesthesia Start: 0900 Anesthesia Stop:     Procedure: EGD BIOPSY (Upper GI Region) Diagnosis:       Gastroesophageal reflux disease with esophagitis without hemorrhage      (Gastroesophageal reflux disease with esophagitis without hemorrhage [K21.00])    Surgeons: Shante Mills MD Responsible Provider: Vijay Jenkins MD    Anesthesia Type: TIVA ASA Status: 3          Anesthesia Type: TIVA    Natasha Phase I: Natasha Score: 10    Natasha Phase II:        Anesthesia Post Evaluation    Patient location during evaluation: PACU  Patient participation: complete - patient participated  Level of consciousness: awake and alert  Pain score: 0  Airway patency: patent  Nausea & Vomiting: no nausea and no vomiting  Complications: no  Cardiovascular status: hemodynamically stable  Respiratory status: acceptable, nonlabored ventilation and spontaneous ventilation  Hydration status: euvolemic  Comments: BP (!) 140/71   Pulse 91   Temp 98 °F (36.7 °C) (Oral)   Resp 16   Ht 6' (1.829 m)   Wt (!) 376 lb (170.6 kg)   SpO2 96%   BMI 50.99 kg/m²     Multimodal analgesia pain management approach

## 2022-08-09 RX ORDER — OXYCODONE HYDROCHLORIDE AND ACETAMINOPHEN 5; 325 MG/1; MG/1
1 TABLET ORAL EVERY 6 HOURS PRN
Qty: 20 TABLET | Refills: 0 | Status: CANCELLED | OUTPATIENT
Start: 2022-08-09 | End: 2022-08-14

## 2022-08-09 RX ORDER — OMEPRAZOLE 40 MG/1
40 CAPSULE, DELAYED RELEASE ORAL
Qty: 90 CAPSULE | Refills: 0 | Status: CANCELLED | OUTPATIENT
Start: 2022-08-09

## 2022-08-09 NOTE — PROGRESS NOTES
Bubba Garcia MD                                  [address] [place] [address]                                  Jes[place] Barron [address]                                  Phone [phone], Fax [phone]    History and Physical    Patient: Ken Montana MRN: [id number]  SSN: [id number]    YOB: 1965  Age: 61 y.o. Sex: male              PCP: Tyler Fernández MD   Date:  8/16/2022    Consult Date: 06/30/2022    Bariatric Procedure of Interest: laparoscopic sleeve gastrectomy   o  Ken Montana returns to the [facility] after successful completion of our multidisciplinary surgical prep program.  This is his final consultation preparing him for sleeve gastrectomy surgery. He presents with a height of 6' (1.829 m) and weight of (!) 375 lb (170.1 kg), giving him a Body mass index is 50.86 kg/m². He has an ideal body weight of 184 lbs, and excess body weight of 191 lbs. He started our program with a weight of 380 lbs, losing 5 lbs. 30-day Bariatric Surgical Risk Percentage: 5.15, 10.84%    He has completed all aspects of our prep program and has been deemed an acceptable candidate for bariatric surgery. PSYCHOLOGICAL EVALUATION:   Completed, 08/02/2022 with Edith Schuster deeming him an appropriate surgical candidate. DIETITIAN EVALUATION:  Completed with Bianca Mcallister RD, NICOLE deeming him an appropriate surgical candidate. BARIATRIC LABS:  Completed, 07/07/2022 (A1C 6.3, folate 17.8)    UPPER GI:  Completed, 07/07/2022  Small hiatal hernia. No reflux demonstrated during the course of the   exam. Otherwise unremarkable. EGD:  Completed 08/05/2022  PREOPERATIVE DIAGNOSIS:  Morbid obesity with a history of peptic ulcer disease and Scruggs's esophagus without dysplasia, who wants to proceed with bariatric surgery. POSTOPERATIVE DIAGNOSES:  1.  Small hiatal hernia. 2.  Scruggs's esophagus noted, essentially unchanged. 3.  Gastritis.   4.  No evidence of ulcerations. PROCEDURE PERFORMED:  EGD with biopsies. SURGEON:  Pedro Lin MD     ASSISTANT:  None. ANESTHESIA:  Monitored anesthesia care with Dr. Pratik Thibodeaux. COMPLICATIONS:  None. SPECIMENS REMOVED:  Gastric biopsies x2, sent to Pathology. IMPLANTS:  None. ESTIMATED BLOOD LOSS:  None. DRAINS:  None. HISTORY:  A 54-year-old male, who came to [facility] [address] [facility] of his own volition for consideration of bariatric surgery. During his workup, he had noted a history of peptic ulcer disease in the past as well as Scruggs's esophagus. He has had this biopsied multiple times and there has been no evidence of dysplasia. I recommended that he have an EGD to evaluate for any ulcerations prior to proceeding with bariatric surgery. He was agreeable. I went through risks of bleeding, infection, anesthesia, perforation, esophagus, stomach, small bowel. The patient signed a consent form, was scheduled for 08/05/2022. The patient was taken to the endo fluoro room due to his BMI of greater than 50 plus the fact that he had a Mallampati score of 4. Dr. Pratik Thibodeaux, the anesthesiologist felt that it was the safest situation to do the procedure in the endo fluoro room. He had one of the oxygen masks and a bite guard applied. A time-out was done identifying the patient, surgeon, procedure and his birth date of 1965 . When everyone in the room agreed, monitored anesthesia care was done. He remains hemodynamically stable with good oxygen saturations throughout the procedure. I advanced an adult upper endoscope through the mask, through the bite guard to the posterior pharynx down the esophagus. Advanced scope into the body of stomach, which was insufflated with air. I then advanced the scope through the pylorus, second portion of duodenum. Duodenum was free of any abnormalities. Scope was brought back to the antrum.   He had significant linear erythematous streaks heading towards the pylorus. These were biopsied with the biopsy forceps x2 and sent to Pathology. I did not see any evidence of ulcerations. We checked the body, fundus and cardia. He had a small hiatal hernia. The scope was withdrawn and Scruggs's esophagus was noted. He had just had this biopsied a year ago and will follow up with GI for continued evaluation of this. The rest of the esophagus appeared normal.  The patient tolerated the procedure well. He will follow up to get the biopsy results and proceed with bariatric surgery. FINDINGS:  1.  Small hiatal hernia. 2.  Scruggs's. 3.  Gastritis. 4.  No ulcerations noted. PHYSICAL THERAPY EVALUATION FOR MOBILITY OPTIMIZATION:   Completed, 07/11/2022    We have reviewed the procedure again today and completed our standard pre op teaching. His questions were answered and he is ready to schedule surgery. We have discussed the procedure, diet and exercise regimens, and potential complications of surgery. The patient understands the nature and potential complication of surgery and has the capacity to follow the post operative diet, exercise, and nutritional requirements. After review, he has signed his surgical consent today.       MEDICAL HISTORY:  Morbid Obesity  Scruggs's esophagus  Hx of gastric ulcers  Prostate problems  Lymphedema  Back pain  Kidney stones     Comorbidity Yes or No   Obstructive Sleep Apnea  CPAP/BIPAP use1= Yes Yes   Diabetes Mellitus, non-insulin Yes   Hypertension No   Gastroesophageal Reflux  Treatment Med= Prilosec Yes   Hyperlipidemia No   Coronary Artery Disease No   Cancer No   Asthma No   Osteoarthritis Yes   Joint Pain Yes         Other Yes or No   Venous Stasis Yes   Dialysis No   Long term use of steroids or immunocompromised conditions No   On Anticoagulants No         PRIOR WEIGHT LOSS ATTEMPTS:  Reports 11-15 previous weight loss attempts including medication (Phentermine), Weight Watchers, Atkins, and low calorie high protein diets. EXERCISE ASSESSMENT:  Reports physical activity some days of the week. DENTAL ISSUES:  No dental issues with chewing. PSYCHOSOCIAL:  He notes he is  and states main support person is his wife, Albino Yi. 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 His goal in pursuing surgical weight loss is to improve health. He reports no history of depression or anxiety. Denies history of mental health disorders. He states he is independent in his care, can drive a car, and can ambulate without assistance. HISTORY:  Past Medical History:   Diagnosis Date    Arthritis     osteo - in back    Chronic pain     spinal stenosis--- neck and lower restriction    GERD (gastroesophageal reflux disease)     Ill-defined disease     swelling of both lower legs- SCIATICA- legs feel like \"burning\"    Insomnia     Morbid obesity (HCC)     BMI 49.6    Other ill-defined conditions(799.89)     stuffy nose/ infection    Other ill-defined conditions(799.89)     spinal stenosis    Other ill-defined conditions(799.89)     several kidney stones-passed on own    Peptic ulcer 10/11/2013    Peripheral neuropathy     cymbalta    Unspecified sleep apnea     cpap- bring dos       He denies personal or family history of problems with anesthesia, bleeding, or clotting. He denies history of DVT or pulmonary embolism. He denies dysphagia.       Past Surgical History:   Procedure Laterality Date    APPENDECTOMY      COLONOSCOPY      ENDOSCOPY, COLON, DIAGNOSTIC      HEENT      sinus surgery; nasal polyps removed - Dr. Min Batista      t/a    UPPER GASTROINTESTINAL ENDOSCOPY N/A 2022    EGD BIOPSY performed by Peterson Cunha MD at [facility] END[facility]     Social History     Tobacco Use    Smoking status: Former     Types: Cigarettes     Quit date: 10/11/2004     Years since quittin.8    Smokeless tobacco: Never   Vaping Use    Vaping Use: Never used   Substance Use Topics Alcohol use: Yes     Comment: drink every two months or so    Drug use: No     Family History   Problem Relation Age of Onset    Cancer Paternal Uncle         leukemia    Hypertension Maternal Grandfather     Cancer Father         Lung cancer - smoker    Glaucoma Maternal Grandmother     Cancer Paternal Grandmother         lung cancer - smoker        MEDICATIONS:  Current Outpatient Medications   Medication Sig Dispense Refill    ondansetron (ZOFRAN) 4 MG tablet Take 1 tablet by mouth 3 times daily as needed for Nausea or Vomiting 30 tablet 0    HYDROmorphone (DILAUDID) 2 MG tablet Take 1 tablet by mouth every 6 hours as needed for Pain for up to 5 days. 20 tablet 0    triamcinolone (KENALOG) 0.1 % lotion Apply topically 3 times daily Apply topically 3 times daily. 1 each 1    temazepam (RESTORIL) 30 MG capsule Take 1 capsule by mouth nightly as needed for Sleep for up to 30 days. 30 capsule 5    omeprazole (PRILOSEC) 40 MG delayed release capsule Take 1 capsule by mouth in the morning and 1 capsule before bedtime. 90 capsule 3    hydroCHLOROthiazide (HYDRODIURIL) 25 MG tablet Take 1 tablet by mouth in the morning. 30 tablet 11    DULoxetine (CYMBALTA) 60 MG extended release capsule 2 po qd 180 capsule 3    hydrocortisone (ANUSOL-HC) 25 MG suppository Place 1 suppository rectally in the morning and 1 suppository in the evening. 20 suppository 11    amoxicillin (AMOXIL) 875 MG tablet Take 1 tablet by mouth in the morning and 1 tablet before bedtime. Do all this for 10 days.  20 tablet 0    aspirin 325 MG EC tablet Take 325 mg by mouth every 4 hours as needed      Ascorbic Acid (VITAMIN C CR) 1000 MG TBCR Take by mouth      Diclofenac Sodium 1.5 % SOLN 40 drops to a knee 4 times a day      fluticasone (FLONASE) 50 MCG/ACT nasal spray 2 sprays by Nasal route as needed      Glucosamine 500 MG CAPS Take 500 mg by mouth 3 times daily      Magnesium 500 MG CAPS Take by mouth      mirtazapine (REMERON) 30 MG tablet Take 30 mg by mouth nightly      naproxen (EC NAPROSYN) 500 MG EC tablet Take 500 mg by mouth as needed      tamsulosin (FLOMAX) 0.4 MG capsule TAKE 1 CAPSULE NIGHTLY       No current facility-administered medications for this visit. ALLERGIES:  Allergies   Allergen Reactions    Bee Venom Shortness Of Breath and Swelling     Carried epi pen    Wasp Venom Protein Anaphylaxis    Morphine Other (See Comments)     \"makes me mean\"  Other reaction(s): Altered Mental State-Intolerance       ROS: The patient has no difficulty with chest pain or shortness of breath. No fever or chills. Comprehensive 13 point review of systems was otherwise unremarkable except as noted above. PHYSICAL EXAM:   BP (!) 148/82   Pulse (!) 111   Ht 6' (1.829 m)   Wt (!) 375 lb (170.1 kg)   BMI 50.86 kg/m²     General: Alert oriented cooperative white malein no acute distress. Eyes: Sclera are clear. Conjunctiva and lids within normal limits. No icterus. Ears and Nose: no gross deformities to visual inspection, gross hearing intact  Neck: Supple, trachea midline, no appreciable thyromegaly  Resp: No JVD. Breathing is  non-labored. Lungs clear to auscultation without wheezing or rhonchi   CV: RRR. No murmurs, rubs or gallops appreciated, Carotid bruits are absent  Abd: soft non-tender and non-distended without peritoneal signs. +bs    Psych:  Mood and affect appropriate. Short-term memory and understanding intact      ASSESSMENT:  Morbid obesity with a  Body mass index is 50.86 kg/m². ready for sleeve gastrectomy surgery. PLAN:  1.  Schedule for laparoscopic, possible open, sleeve gastrectomy, in the near future. The gastric sleeve procedure is performed utilizing a stapler to remove a portion of the stomach, creating a smaller, banana-shaped tube. The gastric bypass procedure is performed by utilizing a stapler to create a smaller stomach pouch with the upper part of the stomach.  The upper part of the small intestine is then divided into a tube with two ends. One end is connected to the new stomach pouch and the other end is connected to another part of the small intestine. This creates a new pathway for food, bypassing a portion of the small intestine. Intraoperative EGD may be performed during the procedure and is considered medically necessary to rule out concerns with leak, bleeding, and/or obstruction. 2.  Patient will complete our 2 week pre operative diet. 3.  Bring CPAP and incentive spirometer (given with our standard instruction to use BID 2 weeks prior to surgery) to hospital on day of surgery. 4.  The patient received prescription to use after surgery for: Dilaudid, Zofran, and Prilosec. I went over the risks and benefits with the patient. For risks I went over problems with bleeding, infection and anesthesia. I also noted potential problems of injury to the esophagus, liver, spleen, stomach, pancreas, small bowel and or colon. I addition, I discussed potential problems of DVT/pulmonary embolism, urinary tract infection, wound infection, myocardial infarction, stroke and the potential need to convert to an open procedure. I quoted a 1% nationwide mortality rate for this procedure. He has signed our extensive consent form which is in the chart.        Donovan Pizano MD  Date:  8/16/2022

## 2022-08-15 ENCOUNTER — OFFICE VISIT (OUTPATIENT)
Dept: FAMILY MEDICINE CLINIC | Facility: CLINIC | Age: 57
End: 2022-08-15
Payer: COMMERCIAL

## 2022-08-15 VITALS
WEIGHT: 315 LBS | SYSTOLIC BLOOD PRESSURE: 138 MMHG | OXYGEN SATURATION: 96 % | RESPIRATION RATE: 16 BRPM | HEIGHT: 72 IN | DIASTOLIC BLOOD PRESSURE: 79 MMHG | HEART RATE: 112 BPM | TEMPERATURE: 97.2 F | BODY MASS INDEX: 42.66 KG/M2

## 2022-08-15 DIAGNOSIS — L30.9 DERMATITIS: ICD-10-CM

## 2022-08-15 DIAGNOSIS — E11.40 TYPE 2 DIABETES MELLITUS WITH DIABETIC NEUROPATHY, WITHOUT LONG-TERM CURRENT USE OF INSULIN (HCC): ICD-10-CM

## 2022-08-15 DIAGNOSIS — E11.9 CONTROLLED TYPE 2 DIABETES MELLITUS WITHOUT COMPLICATION, WITHOUT LONG-TERM CURRENT USE OF INSULIN (HCC): Primary | ICD-10-CM

## 2022-08-15 DIAGNOSIS — K27.9 PEPTIC ULCER: ICD-10-CM

## 2022-08-15 DIAGNOSIS — K64.0 GRADE I HEMORRHOIDS: ICD-10-CM

## 2022-08-15 DIAGNOSIS — M79.89 LOCALIZED SWELLING OF BOTH LOWER EXTREMITIES: ICD-10-CM

## 2022-08-15 DIAGNOSIS — J01.00 ACUTE NON-RECURRENT MAXILLARY SINUSITIS: ICD-10-CM

## 2022-08-15 DIAGNOSIS — F51.01 PRIMARY INSOMNIA: ICD-10-CM

## 2022-08-15 DIAGNOSIS — K21.9 GASTROESOPHAGEAL REFLUX DISEASE WITHOUT ESOPHAGITIS: ICD-10-CM

## 2022-08-15 PROCEDURE — 99214 OFFICE O/P EST MOD 30 MIN: CPT | Performed by: FAMILY MEDICINE

## 2022-08-15 PROCEDURE — 3044F HG A1C LEVEL LT 7.0%: CPT | Performed by: FAMILY MEDICINE

## 2022-08-15 RX ORDER — HYDROCHLOROTHIAZIDE 25 MG/1
25 TABLET ORAL DAILY
Qty: 30 TABLET | Refills: 11 | Status: SHIPPED | OUTPATIENT
Start: 2022-08-15

## 2022-08-15 RX ORDER — TEMAZEPAM 30 MG/1
30 CAPSULE ORAL NIGHTLY PRN
Qty: 30 CAPSULE | Refills: 5 | Status: SHIPPED | OUTPATIENT
Start: 2022-08-15 | End: 2022-09-14

## 2022-08-15 RX ORDER — TRIAMCINOLONE ACETONIDE 1 MG/ML
LOTION TOPICAL 3 TIMES DAILY
Qty: 1 EACH | Refills: 1 | Status: SHIPPED | OUTPATIENT
Start: 2022-08-15

## 2022-08-15 RX ORDER — TRIAMCINOLONE ACETONIDE 1 MG/ML
LOTION TOPICAL 3 TIMES DAILY
COMMUNITY
End: 2022-08-15 | Stop reason: SDUPTHER

## 2022-08-15 RX ORDER — AMOXICILLIN 875 MG/1
875 TABLET, COATED ORAL 2 TIMES DAILY
Qty: 20 TABLET | Refills: 0 | Status: SHIPPED | OUTPATIENT
Start: 2022-08-15 | End: 2022-08-25

## 2022-08-15 RX ORDER — OMEPRAZOLE 40 MG/1
40 CAPSULE, DELAYED RELEASE ORAL 2 TIMES DAILY
Qty: 90 CAPSULE | Refills: 3 | Status: SHIPPED | OUTPATIENT
Start: 2022-08-15

## 2022-08-15 RX ORDER — HYDROCORTISONE ACETATE 25 MG/1
25 SUPPOSITORY RECTAL EVERY 12 HOURS
Qty: 20 SUPPOSITORY | Refills: 11 | Status: SHIPPED | OUTPATIENT
Start: 2022-08-15

## 2022-08-15 RX ORDER — DULOXETIN HYDROCHLORIDE 60 MG/1
CAPSULE, DELAYED RELEASE ORAL
Qty: 180 CAPSULE | Refills: 3 | Status: SHIPPED | OUTPATIENT
Start: 2022-08-15

## 2022-08-15 ASSESSMENT — PATIENT HEALTH QUESTIONNAIRE - PHQ9
1. LITTLE INTEREST OR PLEASURE IN DOING THINGS: 0
SUM OF ALL RESPONSES TO PHQ QUESTIONS 1-9: 0
2. FEELING DOWN, DEPRESSED OR HOPELESS: 0
SUM OF ALL RESPONSES TO PHQ9 QUESTIONS 1 & 2: 0
SUM OF ALL RESPONSES TO PHQ QUESTIONS 1-9: 0

## 2022-08-15 NOTE — PROGRESS NOTES
[address] [facility]  [address], [place] [address]  Phone: [phone] Fax [phone]  Karina Mora MD  8/15/2022         Mr. Venancio Marion  is a 61y.o.  year old  male patient who comes in to check on  hypertension, as well as leg swelling. We had put him on hydrochlorothiazide 12.5 mg daily and that does seem to help but not completely. We are also following up on Cymbalta. He is on 120 mg daily and that does seem to have helped his back. He still does have a lot of trouble with his back. He is getting evaluated for doing       He needs hip replacement but because of his weight they are not able to do it. He is doing well with the Remeron. He does take Restoril for sleep as well. It handles him well. He does need refills. He has had a lot of trouble with hemorrhoids and is wondering what he can do for that. Steroid cream has helped the dermatitis on his legs. He also has a lot of nasal congestion and head pressure and blowing green-yellow discharge out of his nose. No fevers. Mr. Venancio Mairon  has  has a past medical history of Arthritis, Chronic pain, GERD (gastroesophageal reflux disease), Ill-defined disease, Insomnia, Morbid obesity ([address] Other ill-defined conditions(799.89), Other ill-defined conditions(799.89), Other ill-defined conditions(799.89), Peptic ulcer, Peripheral neuropathy, and Unspecified sleep apnea. Mr. Venancio Marion  has  has a past surgical history that includes Appendectomy; heent; heent; Endoscopy, colon, diagnostic; Colonoscopy; and Upper gastrointestinal endoscopy (N/A, 8/5/2022). Mr. Venancio Marion   Current Outpatient Medications   Medication Sig Dispense Refill    triamcinolone (KENALOG) 0.1 % lotion Apply topically 3 times daily Apply topically 3 times daily. 1 each 1    temazepam (RESTORIL) 30 MG capsule Take 1 capsule by mouth nightly as needed for Sleep for up to 30 days.  30 capsule 5    omeprazole (PRILOSEC) 40 MG delayed release capsule Take 1 capsule by mouth in the morning and 1 capsule before bedtime. 90 capsule 3    hydroCHLOROthiazide (HYDRODIURIL) 25 MG tablet Take 1 tablet by mouth in the morning. 30 tablet 11    DULoxetine (CYMBALTA) 60 MG extended release capsule 2 po qd 180 capsule 3    hydrocortisone (ANUSOL-HC) 25 MG suppository Place 1 suppository rectally in the morning and 1 suppository in the evening. 20 suppository 11    amoxicillin (AMOXIL) 875 MG tablet Take 1 tablet by mouth in the morning and 1 tablet before bedtime. Do all this for 10 days. 20 tablet 0    aspirin 325 MG EC tablet Take 325 mg by mouth every 4 hours as needed      Ascorbic Acid (VITAMIN C CR) 1000 MG TBCR Take by mouth      Diclofenac Sodium 1.5 % SOLN 40 drops to a knee 4 times a day      fluticasone (FLONASE) 50 MCG/ACT nasal spray 2 sprays by Nasal route as needed      Glucosamine 500 MG CAPS Take 500 mg by mouth 3 times daily      Magnesium 500 MG CAPS Take by mouth      mirtazapine (REMERON) 30 MG tablet Take 30 mg by mouth nightly      naproxen (EC NAPROSYN) 500 MG EC tablet Take 500 mg by mouth as needed      tamsulosin (FLOMAX) 0.4 MG capsule TAKE 1 CAPSULE NIGHTLY       No current facility-administered medications for this visit. Mr. Elvin Bailon History   Problem Relation Age of Onset    Cancer Paternal Uncle         leukemia    Hypertension Maternal Grandfather     Cancer Father         Lung cancer - smoker    Glaucoma Maternal Grandmother     Cancer Paternal Grandmother         lung cancer - smoker       Mr. Naranjo    Social History     Socioeconomic History    Marital status:      Spouse name: Not on file    Number of children: Not on file    Years of education: Not on file    Highest education level: Not on file   Occupational History    Not on file   Tobacco Use    Smoking status: Former     Types: Cigarettes     Quit date: 10/11/2004     Years since quittin.8    Smokeless tobacco: Never   Vaping Use    Vaping Use: Never used   Substance and Sexual Activity    Alcohol use: Yes     Comment: drink every two months or so    Drug use: No    Sexual activity: Not on file   Other Topics Concern    Not on file   Social History Narrative    Not on file     Social Determinants of Health     Financial Resource Strain: Not on file   Food Insecurity: Not on file   Transportation Needs: Not on file   Physical Activity: Not on file   Stress: Not on file   Social Connections: Not on file   Intimate Partner Violence: Not on file   Housing Stability: Not on file       Mr. Krishna Wilson  has the following allergies: Allergies   Allergen Reactions    Bee Venom Shortness Of Breath and Swelling     Carried epi pen    Wasp Venom Protein Anaphylaxis    Morphine Other (See Comments)     \"makes me mean\"  Other reaction(s): Altered Mental State-Intolerance       /79   Pulse (!) 112   Temp 97.2 °F (36.2 °C)   Resp 16   Ht 6' (1.829 m)   Wt (!) 379 lb (171.9 kg)   SpO2 96%   BMI 51.40 kg/m²     HEENT: Normocephalic, atraumatic, pupils equal and reactive to light. Neck: Supple, no masses or thyromegaly. Lungs: clear to auscultation bilaterally. CV: regular rate and rhythm, without murmurs, rubs, or gallops. Ext: No lower extremity edema,        No results found for this visit on 08/15/22. Hannah Chris was seen today for swelling and follow-up. Diagnoses and all orders for this visit:    Controlled type 2 diabetes mellitus without complication, without long-term current use of insulin (HCC)  -     DULoxetine (CYMBALTA) 60 MG extended release capsule; 2 po qd    Type 2 diabetes mellitus with diabetic neuropathy, without long-term current use of insulin (HCC)  -     DULoxetine (CYMBALTA) 60 MG extended release capsule; 2 po qd    Localized swelling of both lower extremities  -     hydroCHLOROthiazide (HYDRODIURIL) 25 MG tablet; Take 1 tablet by mouth in the morning.     Gastroesophageal reflux disease without esophagitis  -     omeprazole (PRILOSEC) 40 MG delayed release capsule; Take 1 capsule by mouth in the morning and 1 capsule before bedtime. Peptic ulcer    Dermatitis  -     triamcinolone (KENALOG) 0.1 % lotion; Apply topically 3 times daily Apply topically 3 times daily. Grade I hemorrhoids  -     hydrocortisone (ANUSOL-HC) 25 MG suppository; Place 1 suppository rectally in the morning and 1 suppository in the evening. Acute non-recurrent maxillary sinusitis  -     amoxicillin (AMOXIL) 875 MG tablet; Take 1 tablet by mouth in the morning and 1 tablet before bedtime. Do all this for 10 days. Primary insomnia  -     temazepam (RESTORIL) 30 MG capsule; Take 1 capsule by mouth nightly as needed for Sleep for up to 30 days. Use hydrochlorothiazide to 25 mg daily and follow-up in 3 months to see how that is doing for him. Patient counseled on diet and exercise.     Diane Amezquita MD

## 2022-08-16 ENCOUNTER — PREP FOR PROCEDURE (OUTPATIENT)
Dept: SURGERY | Age: 57
End: 2022-08-16

## 2022-08-16 ENCOUNTER — OFFICE VISIT (OUTPATIENT)
Dept: SURGERY | Age: 57
End: 2022-08-16

## 2022-08-16 VITALS
SYSTOLIC BLOOD PRESSURE: 148 MMHG | WEIGHT: 315 LBS | HEART RATE: 111 BPM | HEIGHT: 72 IN | DIASTOLIC BLOOD PRESSURE: 82 MMHG | BODY MASS INDEX: 42.66 KG/M2

## 2022-08-16 DIAGNOSIS — Z98.890 POST-OPERATIVE NAUSEA AND VOMITING: ICD-10-CM

## 2022-08-16 DIAGNOSIS — G89.18 POST-OPERATIVE PAIN: ICD-10-CM

## 2022-08-16 DIAGNOSIS — M16.10 HIP ARTHRITIS: ICD-10-CM

## 2022-08-16 DIAGNOSIS — K22.70 BARRETT'S ESOPHAGUS WITHOUT DYSPLASIA: ICD-10-CM

## 2022-08-16 DIAGNOSIS — E11.9 CONTROLLED TYPE 2 DIABETES MELLITUS WITHOUT COMPLICATION, WITHOUT LONG-TERM CURRENT USE OF INSULIN (HCC): ICD-10-CM

## 2022-08-16 DIAGNOSIS — K21.00 GASTROESOPHAGEAL REFLUX DISEASE WITH ESOPHAGITIS WITHOUT HEMORRHAGE: ICD-10-CM

## 2022-08-16 DIAGNOSIS — E66.01 MORBID OBESITY (HCC): Primary | ICD-10-CM

## 2022-08-16 DIAGNOSIS — I83.11 VARICOSE VEINS OF BOTH LOWER EXTREMITIES WITH INFLAMMATION: ICD-10-CM

## 2022-08-16 DIAGNOSIS — G47.33 OSA (OBSTRUCTIVE SLEEP APNEA): ICD-10-CM

## 2022-08-16 DIAGNOSIS — K27.9 PEPTIC ULCER: ICD-10-CM

## 2022-08-16 DIAGNOSIS — E66.01 OBESITY, MORBID, BMI 50 OR HIGHER (HCC): ICD-10-CM

## 2022-08-16 DIAGNOSIS — R11.2 POST-OPERATIVE NAUSEA AND VOMITING: ICD-10-CM

## 2022-08-16 DIAGNOSIS — I83.12 VARICOSE VEINS OF BOTH LOWER EXTREMITIES WITH INFLAMMATION: ICD-10-CM

## 2022-08-16 PROCEDURE — APPSS45 APP SPLIT SHARED TIME 31-45 MINUTES: Performed by: PHYSICIAN ASSISTANT

## 2022-08-16 PROCEDURE — 99024 POSTOP FOLLOW-UP VISIT: CPT | Performed by: SURGERY

## 2022-08-16 RX ORDER — HYDROMORPHONE HYDROCHLORIDE 2 MG/1
2 TABLET ORAL EVERY 6 HOURS PRN
Qty: 20 TABLET | Refills: 0 | Status: SHIPPED | OUTPATIENT
Start: 2022-08-16 | End: 2022-08-21

## 2022-08-16 RX ORDER — ONDANSETRON 4 MG/1
4 TABLET, FILM COATED ORAL 3 TIMES DAILY PRN
Qty: 30 TABLET | Refills: 0 | Status: SHIPPED | OUTPATIENT
Start: 2022-08-16

## 2022-08-17 ENCOUNTER — ANESTHESIA EVENT (OUTPATIENT)
Dept: SURGERY | Age: 57
DRG: 621 | End: 2022-08-17
Payer: COMMERCIAL

## 2022-08-29 ENCOUNTER — HOSPITAL ENCOUNTER (OUTPATIENT)
Dept: SURGERY | Age: 57
Discharge: HOME | End: 2022-09-01
Payer: COMMERCIAL

## 2022-08-29 VITALS
BODY MASS INDEX: 42.66 KG/M2 | RESPIRATION RATE: 20 BRPM | SYSTOLIC BLOOD PRESSURE: 120 MMHG | HEART RATE: 95 BPM | TEMPERATURE: 97.7 F | WEIGHT: 315 LBS | DIASTOLIC BLOOD PRESSURE: 72 MMHG | OXYGEN SATURATION: 93 % | HEIGHT: 72 IN

## 2022-08-29 LAB
CREAT SERPL-MCNC: 0.78 MG/DL (ref 0.8–1.5)
EKG ATRIAL RATE: 95 BPM
EKG DIAGNOSIS: NORMAL
EKG P AXIS: 76 DEGREES
EKG P-R INTERVAL: 192 MS
EKG Q-T INTERVAL: 366 MS
EKG QRS DURATION: 96 MS
EKG QTC CALCULATION (BAZETT): 459 MS
EKG R AXIS: 61 DEGREES
EKG T AXIS: 72 DEGREES
EKG VENTRICULAR RATE: 95 BPM
HGB BLD-MCNC: 13.2 G/DL (ref 13.6–17.2)
POTASSIUM SERPL-SCNC: 3.9 MMOL/L (ref 3.5–5.1)

## 2022-08-29 PROCEDURE — 82565 ASSAY OF CREATININE: CPT

## 2022-08-29 PROCEDURE — 85018 HEMOGLOBIN: CPT

## 2022-08-29 PROCEDURE — 84132 ASSAY OF SERUM POTASSIUM: CPT

## 2022-08-29 PROCEDURE — 93005 ELECTROCARDIOGRAM TRACING: CPT | Performed by: ANESTHESIOLOGY

## 2022-08-29 ASSESSMENT — PAIN DESCRIPTION - ORIENTATION: ORIENTATION: LEFT;RIGHT;MID;LOWER

## 2022-08-29 ASSESSMENT — PAIN DESCRIPTION - DESCRIPTORS: DESCRIPTORS: ACHING

## 2022-08-29 ASSESSMENT — PAIN DESCRIPTION - FREQUENCY: FREQUENCY: CONTINUOUS

## 2022-08-29 ASSESSMENT — PAIN DESCRIPTION - LOCATION: LOCATION: BACK;HIP

## 2022-08-29 ASSESSMENT — PAIN SCALES - GENERAL: PAINLEVEL_OUTOF10: 8

## 2022-08-29 NOTE — PERIOP NOTE
Patient verified name and     Order for consent NOT found in EHR; patient verified. Type 2 surgery, walk in assessment complete. Labs per surgeon: unknown; no orders received in EHR at time of assessment. Labs per anesthesia protocol: K+, Creatinine; HGB; results pending. EKG: completed today; within anesthesia protocols. Case posting states Difficult airway suspected. Will have anesthesia to review. Hospital approved surgical skin cleanser and instructions given per hospital policy. Patient provided with and instructed on educational handouts including Guide to Surgery, Pain Management, Hand Hygiene, Blood Transfusion Education, and [facility] Brochure. Patient answered medical/surgical history questions at their best of ability. All prior to admission medications documented in [facility]. Original medication prescription bottles NOT visualized during patient appointment. Patient instructed to hold all vitamins 7 days prior to surgery and NSAIDS 5 days prior to surgery, patient verbalized understanding. Patient teach back successful and patient demonstrates knowledge of instructions.

## 2022-08-29 NOTE — PERIOP NOTE
SURGICAL WEIGHT LOSS Enhanced Recovery After Surgery: Patients diagnosed with Diabetes with Neuropathy and/or other contraindications. The night before surgery on 9/11/22, drink 1 bottles of the Ensure Pre-Surgery drink. You will drink nothing on the day of surgery. Bring your patient handbook with you to the hospital.        Things to Remember:      1. You will be up in a chair the evening of surgery and sipping on clear liquids, once released by your surgeon. 2. Beginning the day of surgery, you will be out of the bed as able, once released by your hospital team. We encourage you to be sitting up in a chair, walking in the jordan, doing exercises as advised by physical therapy, etc.       3. You will be given scheduled non-narcotic pain medication to help keep your pain under control. You will have stronger pain medication ordered for break through pain. 4. All of these measures are geared toward improving your overall surgical recovery and   decreasing the risk of complications.

## 2022-08-31 ENCOUNTER — TELEPHONE (OUTPATIENT)
Dept: SURGERY | Age: 57
End: 2022-08-31

## 2022-08-31 NOTE — TELEPHONE ENCOUNTER
S/w pt notifying him of the approval for surgery scheduled on 9/12/2022 and that his surgery fee of $242.00 would be due prior to surgery. Pt states he will call back to pay.

## 2022-09-09 NOTE — H&P
Meghan Carey MD                                  [address] [facility] [address] [facility]                                  [facility] [address]                                  Phone [phone], Fax [phone]    History and Physical    Patient: Min Perez MRN: [id number]  SSN: [id number]    YOB: 1965  Age: 61 y.o. Sex: male              PCP: Tiara Dennison MD   Date:  2022    Consult Date: 2022    Bariatric Procedure of Interest: laparoscopic sleeve gastrectomy   o  Min Perez returns to the [facility] after successful completion of our multidisciplinary surgical prep program.  This is his final consultation preparing him for sleeve gastrectomy surgery. He presents with a height of 6' (1.829 m) and weight of (!) 375 lb (170.1 kg), giving him a Body mass index is 51.81 kg/m². He has an ideal body weight of 184 lbs, and excess body weight of 191 lbs. He started our program with a weight of 380 lbs, losing 5 lbs. 30-day Bariatric Surgical Risk Percentage: 5.15, 10.84%    He has completed all aspects of our prep program and has been deemed an acceptable candidate for bariatric surgery. PSYCHOLOGICAL EVALUATION:   Completed, 2022 with Kiete  deeming him an appropriate surgical candidate. DIETITIAN EVALUATION:  Completed with Anibal Del Rio RD, LD deeming him an appropriate surgical candidate. BARIATRIC LABS:  Completed, 2022 (A1C 6.3, folate 17.8)    UPPER GI:  Completed, 2022  Small hiatal hernia. No reflux demonstrated during the course of the   exam. Otherwise unremarkable. EGD:  Completed 2022  PREOPERATIVE DIAGNOSIS:  Morbid obesity with a history of peptic ulcer disease and Scruggs's esophagus without dysplasia, who wants to proceed with bariatric surgery. POSTOPERATIVE DIAGNOSES:  1.  Small hiatal hernia. 2.  Scruggs's esophagus noted, essentially unchanged. 3.  Gastritis.   4.  No evidence of ulcerations. PROCEDURE PERFORMED:  EGD with biopsies. SURGEON:  Tomy Sandhoff, MD     ASSISTANT:  None. ANESTHESIA:  Monitored anesthesia care with Dr. Terrell Mariee. COMPLICATIONS:  None. SPECIMENS REMOVED:  Gastric biopsies x2, sent to Pathology. IMPLANTS:  None. ESTIMATED BLOOD LOSS:  None. DRAINS:  None. HISTORY:  A 65-year-old male, who came to [facility] of his own volition for consideration of bariatric surgery. During his workup, he had noted a history of peptic ulcer disease in the past as well as Scruggs's esophagus. He has had this biopsied multiple times and there has been no evidence of dysplasia. I recommended that he have an EGD to evaluate for any ulcerations prior to proceeding with bariatric surgery. He was agreeable. I went through risks of bleeding, infection, anesthesia, perforation, esophagus, stomach, small bowel. The patient signed a consent form, was scheduled for 08/05/2022. The patient was taken to the endo fluoro room due to his BMI of greater than 50 plus the fact that he had a Mallampati score of 4. Dr. Terrell Mariee, the anesthesiologist felt that it was the safest situation to do the procedure in the endo fluoro room. He had one of the oxygen masks and a bite guard applied. A time-out was done identifying the patient, surgeon, procedure and his birth date of 1965 . When everyone in the room agreed, monitored anesthesia care was done. He remains hemodynamically stable with good oxygen saturations throughout the procedure. I advanced an adult upper endoscope through the mask, through the bite guard to the posterior pharynx down the esophagus. Advanced scope into the body of stomach, which was insufflated with air. I then advanced the scope through the pylorus, second portion of duodenum. Duodenum was free of any abnormalities. Scope was brought back to the antrum.   He had significant linear erythematous streaks heading towards the pylorus. These were biopsied with the biopsy forceps x2 and sent to Pathology. I did not see any evidence of ulcerations. We checked the body, fundus and cardia. He had a small hiatal hernia. The scope was withdrawn and Scruggs's esophagus was noted. He had just had this biopsied a year ago and will follow up with GI for continued evaluation of this. The rest of the esophagus appeared normal.  The patient tolerated the procedure well. He will follow up to get the biopsy results and proceed with bariatric surgery. FINDINGS:  1.  Small hiatal hernia. 2.  Scruggs's. 3.  Gastritis. 4.  No ulcerations noted. PHYSICAL THERAPY EVALUATION FOR MOBILITY OPTIMIZATION:   Completed, 07/11/2022    We have reviewed the procedure again today and completed our standard pre op teaching. His questions were answered and he is ready to schedule surgery. We have discussed the procedure, diet and exercise regimens, and potential complications of surgery. The patient understands the nature and potential complication of surgery and has the capacity to follow the post operative diet, exercise, and nutritional requirements. After review, he has signed his surgical consent today.       MEDICAL HISTORY:  Morbid Obesity  Scruggs's esophagus  Hx of gastric ulcers  Prostate problems  Lymphedema  Back pain  Kidney stones     Comorbidity Yes or No   Obstructive Sleep Apnea  CPAP/BIPAP use1= Yes Yes   Diabetes Mellitus, non-insulin Yes   Hypertension No   Gastroesophageal Reflux  Treatment Med= Prilosec Yes   Hyperlipidemia No   Coronary Artery Disease No   Cancer No   Asthma No   Osteoarthritis Yes   Joint Pain Yes         Other Yes or No   Venous Stasis Yes   Dialysis No   Long term use of steroids or immunocompromised conditions No   On Anticoagulants No         PRIOR WEIGHT LOSS ATTEMPTS:  Reports 11-15 previous weight loss attempts including medication (Phentermine), Weight Watchers, Atkins, and low calorie high protein diets. EXERCISE ASSESSMENT:  Reports physical activity some days of the week. DENTAL ISSUES:  No dental issues with chewing. PSYCHOSOCIAL:  He notes he is  and states main support person is his wife, Mary Sweeney. He is employed as a technician with [employer] His goal in pursuing surgical weight loss is to improve health. He reports no history of depression or anxiety. Denies history of mental health disorders. He states he is independent in his care, can drive a car, and can ambulate without assistance. HISTORY:  Past Medical History:   Diagnosis Date    Arthritis     osteo - in back, hips    Benign prostatic hyperplasia     Chronic pain     hips, neck, back    Diabetes mellitus ([facility]     Controlled type 2 diabetes mellitus without complication, without long-term current use of insulin;  no bs checks at home; no meds; A1c=6.3 on 7/7/22    Fatty liver     Former smoker     GERD (gastroesophageal reflux disease)     on med for control    History of staph infection 1996    appendix ruptured    Hypertension     on med for control    Ill-defined disease     swelling of both lower legs- SCIATICA- legs feel like \"burning\"    Impaired mobility     walks with cane    Insomnia     Morbid obesity (HCC)     BMI=51.81    Other ill-defined conditions(799.89)     stuffy nose/ infection    Other ill-defined conditions(799.89)     spinal stenosis    Other ill-defined conditions(799.89)     several kidney stones-passed on own    Peptic ulcer 10/11/2013    Peripheral neuropathy     hands and feet    Unspecified sleep apnea     c-pap nightly; instructed to bring dos       He denies personal or family history of problems with anesthesia, bleeding, or clotting. He denies history of DVT or pulmonary embolism. He denies dysphagia.       Past Surgical History:   Procedure Laterality Date    APPENDECTOMY      COLONOSCOPY      ENDOSCOPY, COLON, DIAGNOSTIC      EYE SURGERY Left     SINUS SURGERY      sinus surgery; nasal polyps removed - Dr. César Coyle 2022    EGD BIOPSY performed by Pedro Lin MD at [facility] ENDOSCOPY    VARICOSE VEIN SURGERY Bilateral      Social History     Tobacco Use    Smoking status: Former     Packs/day: 1.00     Years: 25.00     Pack years: 25.00     Types: Cigarettes     Start date: 5     Quit date: 10/11/2004     Years since quittin.9    Smokeless tobacco: Never   Vaping Use    Vaping Use: Never used   Substance Use Topics    Alcohol use: Yes     Comment: drink every two months or so    Drug use: No     Family History   Problem Relation Age of Onset    Cancer Paternal Uncle         leukemia    Hypertension Maternal Grandfather     Cancer Father         Lung cancer - smoker    Glaucoma Maternal Grandmother     Cancer Paternal Grandmother         lung cancer - smoker        MEDICATIONS:  No current facility-administered medications for this encounter. Current Outpatient Medications   Medication Sig Dispense Refill    ondansetron (ZOFRAN) 4 MG tablet Take 1 tablet by mouth 3 times daily as needed for Nausea or Vomiting (Patient not taking: Reported on 2022) 30 tablet 0    triamcinolone (KENALOG) 0.1 % lotion Apply topically 3 times daily Apply topically 3 times daily. 1 each 1    temazepam (RESTORIL) 30 MG capsule Take 1 capsule by mouth nightly as needed for Sleep for up to 30 days. 30 capsule 5    omeprazole (PRILOSEC) 40 MG delayed release capsule Take 1 capsule by mouth in the morning and 1 capsule before bedtime. 90 capsule 3    hydroCHLOROthiazide (HYDRODIURIL) 25 MG tablet Take 1 tablet by mouth in the morning. 30 tablet 11    DULoxetine (CYMBALTA) 60 MG extended release capsule 2 po qd 180 capsule 3    hydrocortisone (ANUSOL-HC) 25 MG suppository Place 1 suppository rectally in the morning and 1 suppository in the evening.  20 suppository 11 aspirin 325 MG EC tablet Take 325 mg by mouth every 4 hours as needed      Ascorbic Acid (VITAMIN C CR) 1000 MG TBCR Take by mouth      Diclofenac Sodium 1.5 % SOLN 40 drops to a knee 4 times a day      fluticasone (FLONASE) 50 MCG/ACT nasal spray 2 sprays by Nasal route as needed      Glucosamine 500 MG CAPS Take 500 mg by mouth in the morning and at bedtime      Magnesium 500 MG CAPS Take 500 mg by mouth daily      mirtazapine (REMERON) 30 MG tablet Take 30 mg by mouth nightly      naproxen (EC NAPROSYN) 500 MG EC tablet Take 500 mg by mouth as needed      tamsulosin (FLOMAX) 0.4 MG capsule TAKE 1 CAPSULE NIGHTLY         ALLERGIES:  Allergies   Allergen Reactions    Bee Venom Shortness Of Breath and Swelling     Carried epi pen    Wasp Venom Protein Anaphylaxis    Morphine Other (See Comments)     \"makes me mean\"  Other reaction(s): Altered Mental State-Intolerance       ROS: The patient has no difficulty with chest pain or shortness of breath. No fever or chills. Comprehensive 13 point review of systems was otherwise unremarkable except as noted above. PHYSICAL EXAM:   Ht 6' (1.829 m)   Wt (!) 382 lb (173.3 kg)   BMI 51.81 kg/m²     General: Alert oriented cooperative white malein no acute distress. Eyes: Sclera are clear. Conjunctiva and lids within normal limits. No icterus. Ears and Nose: no gross deformities to visual inspection, gross hearing intact  Neck: Supple, trachea midline, no appreciable thyromegaly  Resp: No JVD. Breathing is  non-labored. Lungs clear to auscultation without wheezing or rhonchi   CV: RRR. No murmurs, rubs or gallops appreciated, Carotid bruits are absent  Abd: soft non-tender and non-distended without peritoneal signs. +bs    Psych:  Mood and affect appropriate. Short-term memory and understanding intact      ASSESSMENT:  Morbid obesity with a  Body mass index is 51.81 kg/m². ready for sleeve gastrectomy surgery.     PLAN:  1.  Schedule for laparoscopic, possible open, sleeve gastrectomy, in the near future. The gastric sleeve procedure is performed utilizing a stapler to remove a portion of the stomach, creating a smaller, banana-shaped tube. The gastric bypass procedure is performed by utilizing a stapler to create a smaller stomach pouch with the upper part of the stomach. The upper part of the small intestine is then divided into a tube with two ends. One end is connected to the new stomach pouch and the other end is connected to another part of the small intestine. This creates a new pathway for food, bypassing a portion of the small intestine. Intraoperative EGD may be performed during the procedure and is considered medically necessary to rule out concerns with leak, bleeding, and/or obstruction. 2.  Patient will complete our 2 week pre operative diet. 3.  Bring CPAP and incentive spirometer (given with our standard instruction to use BID 2 weeks prior to surgery) to hospital on day of surgery. 4.  The patient received prescription to use after surgery for: Dilaudid, Zofran, and Prilosec. I went over the risks and benefits with the patient. For risks I went over problems with bleeding, infection and anesthesia. I also noted potential problems of injury to the esophagus, liver, spleen, stomach, pancreas, small bowel and or colon. I addition, I discussed potential problems of DVT/pulmonary embolism, urinary tract infection, wound infection, myocardial infarction, stroke and the potential need to convert to an open procedure. I quoted a 1% nationwide mortality rate for this procedure. He has signed our extensive consent form which is in the chart.        Ursula Balderrama MD  Date:  9/9/2022

## 2022-09-12 ENCOUNTER — ANESTHESIA (OUTPATIENT)
Dept: SURGERY | Age: 57
DRG: 621 | End: 2022-09-12
Payer: COMMERCIAL

## 2022-09-12 ENCOUNTER — HOSPITAL ENCOUNTER (INPATIENT)
Age: 57
LOS: 1 days | Discharge: HOME | DRG: 621 | End: 2022-09-13
Attending: SURGERY | Admitting: SURGERY
Payer: COMMERCIAL

## 2022-09-12 PROBLEM — E66.01 MORBID OBESITY (HCC): Status: ACTIVE | Noted: 2022-09-12

## 2022-09-12 LAB
ABO + RH BLD: NORMAL
BLOOD GROUP ANTIBODIES SERPL: NORMAL
SPECIMEN EXP DATE BLD: NORMAL

## 2022-09-12 PROCEDURE — 6360000002 HC RX W HCPCS: Performed by: PHYSICIAN ASSISTANT

## 2022-09-12 PROCEDURE — 2580000003 HC RX 258: Performed by: PHYSICIAN ASSISTANT

## 2022-09-12 PROCEDURE — 2500000003 HC RX 250 WO HCPCS: Performed by: NURSE ANESTHETIST, CERTIFIED REGISTERED

## 2022-09-12 PROCEDURE — 86901 BLOOD TYPING SEROLOGIC RH(D): CPT

## 2022-09-12 PROCEDURE — 97161 PT EVAL LOW COMPLEX 20 MIN: CPT

## 2022-09-12 PROCEDURE — 7100000000 HC PACU RECOVERY - FIRST 15 MIN: Performed by: SURGERY

## 2022-09-12 PROCEDURE — C1894 INTRO/SHEATH, NON-LASER: HCPCS | Performed by: SURGERY

## 2022-09-12 PROCEDURE — 3600000015 HC SURGERY LEVEL 5 ADDTL 15MIN: Performed by: SURGERY

## 2022-09-12 PROCEDURE — 2709999900 HC NON-CHARGEABLE SUPPLY: Performed by: SURGERY

## 2022-09-12 PROCEDURE — 3600000005 HC SURGERY LEVEL 5 BASE: Performed by: SURGERY

## 2022-09-12 PROCEDURE — 6370000000 HC RX 637 (ALT 250 FOR IP): Performed by: ANESTHESIOLOGY

## 2022-09-12 PROCEDURE — C1713 ANCHOR/SCREW BN/BN,TIS/BN: HCPCS | Performed by: SURGERY

## 2022-09-12 PROCEDURE — 2500000003 HC RX 250 WO HCPCS: Performed by: SURGERY

## 2022-09-12 PROCEDURE — APPNB180 APP NON BILLABLE TIME > 60 MINS: Performed by: PHYSICIAN ASSISTANT

## 2022-09-12 PROCEDURE — 6370000000 HC RX 637 (ALT 250 FOR IP): Performed by: PHYSICIAN ASSISTANT

## 2022-09-12 PROCEDURE — 3700000000 HC ANESTHESIA ATTENDED CARE: Performed by: SURGERY

## 2022-09-12 PROCEDURE — 88307 TISSUE EXAM BY PATHOLOGIST: CPT

## 2022-09-12 PROCEDURE — 7100000001 HC PACU RECOVERY - ADDTL 15 MIN: Performed by: SURGERY

## 2022-09-12 PROCEDURE — 6360000002 HC RX W HCPCS: Performed by: SURGERY

## 2022-09-12 PROCEDURE — 6360000002 HC RX W HCPCS: Performed by: ANESTHESIOLOGY

## 2022-09-12 PROCEDURE — 6360000002 HC RX W HCPCS: Performed by: NURSE ANESTHETIST, CERTIFIED REGISTERED

## 2022-09-12 PROCEDURE — 2580000003 HC RX 258: Performed by: SURGERY

## 2022-09-12 PROCEDURE — 3700000001 HC ADD 15 MINUTES (ANESTHESIA): Performed by: SURGERY

## 2022-09-12 PROCEDURE — 1100000000 HC RM PRIVATE

## 2022-09-12 PROCEDURE — 97530 THERAPEUTIC ACTIVITIES: CPT

## 2022-09-12 PROCEDURE — 0DB64Z3 EXCISION OF STOMACH, PERCUTANEOUS ENDOSCOPIC APPROACH, VERTICAL: ICD-10-PCS | Performed by: SURGERY

## 2022-09-12 PROCEDURE — 43775 LAP SLEEVE GASTRECTOMY: CPT | Performed by: SURGERY

## 2022-09-12 PROCEDURE — 6370000000 HC RX 637 (ALT 250 FOR IP): Performed by: SURGERY

## 2022-09-12 PROCEDURE — 2720000010 HC SURG SUPPLY STERILE: Performed by: SURGERY

## 2022-09-12 PROCEDURE — 88312 SPECIAL STAINS GROUP 1: CPT

## 2022-09-12 PROCEDURE — 2580000003 HC RX 258: Performed by: ANESTHESIOLOGY

## 2022-09-12 RX ORDER — SODIUM CHLORIDE 9 MG/ML
INJECTION, SOLUTION INTRAVENOUS PRN
Status: DISCONTINUED | OUTPATIENT
Start: 2022-09-12 | End: 2022-09-12 | Stop reason: HOSPADM

## 2022-09-12 RX ORDER — HYDRALAZINE HYDROCHLORIDE 20 MG/ML
10 INJECTION INTRAMUSCULAR; INTRAVENOUS EVERY 6 HOURS PRN
Status: DISCONTINUED | OUTPATIENT
Start: 2022-09-12 | End: 2022-09-13 | Stop reason: HOSPADM

## 2022-09-12 RX ORDER — SODIUM CHLORIDE 9 MG/ML
INJECTION, SOLUTION INTRAVENOUS PRN
Status: DISCONTINUED | OUTPATIENT
Start: 2022-09-12 | End: 2022-09-13 | Stop reason: HOSPADM

## 2022-09-12 RX ORDER — FENTANYL CITRATE 50 UG/ML
25 INJECTION, SOLUTION INTRAMUSCULAR; INTRAVENOUS EVERY 5 MIN PRN
Status: DISCONTINUED | OUTPATIENT
Start: 2022-09-12 | End: 2022-09-12 | Stop reason: HOSPADM

## 2022-09-12 RX ORDER — SODIUM CHLORIDE 0.9 % (FLUSH) 0.9 %
5-40 SYRINGE (ML) INJECTION PRN
Status: DISCONTINUED | OUTPATIENT
Start: 2022-09-12 | End: 2022-09-13 | Stop reason: HOSPADM

## 2022-09-12 RX ORDER — ENALAPRILAT 2.5 MG/2ML
1.25 INJECTION INTRAVENOUS EVERY 8 HOURS PRN
Status: DISCONTINUED | OUTPATIENT
Start: 2022-09-12 | End: 2022-09-13 | Stop reason: HOSPADM

## 2022-09-12 RX ORDER — SUCCINYLCHOLINE CHLORIDE 20 MG/ML
INJECTION INTRAMUSCULAR; INTRAVENOUS PRN
Status: DISCONTINUED | OUTPATIENT
Start: 2022-09-12 | End: 2022-09-12 | Stop reason: SDUPTHER

## 2022-09-12 RX ORDER — FLUTICASONE PROPIONATE 50 MCG
2 SPRAY, SUSPENSION (ML) NASAL DAILY PRN
Status: DISCONTINUED | OUTPATIENT
Start: 2022-09-12 | End: 2022-09-13 | Stop reason: HOSPADM

## 2022-09-12 RX ORDER — OXYCODONE HYDROCHLORIDE 5 MG/1
10 TABLET ORAL PRN
Status: DISCONTINUED | OUTPATIENT
Start: 2022-09-12 | End: 2022-09-12 | Stop reason: HOSPADM

## 2022-09-12 RX ORDER — SODIUM CHLORIDE AND POTASSIUM CHLORIDE .9; .15 G/100ML; G/100ML
SOLUTION INTRAVENOUS CONTINUOUS
Status: DISCONTINUED | OUTPATIENT
Start: 2022-09-12 | End: 2022-09-13 | Stop reason: HOSPADM

## 2022-09-12 RX ORDER — DEXAMETHASONE SODIUM PHOSPHATE 10 MG/ML
INJECTION INTRAMUSCULAR; INTRAVENOUS PRN
Status: DISCONTINUED | OUTPATIENT
Start: 2022-09-12 | End: 2022-09-12 | Stop reason: SDUPTHER

## 2022-09-12 RX ORDER — OXYMETAZOLINE HYDROCHLORIDE 0.05 G/100ML
2 SPRAY NASAL ONCE
Status: COMPLETED | OUTPATIENT
Start: 2022-09-12 | End: 2022-09-12

## 2022-09-12 RX ORDER — LIDOCAINE HYDROCHLORIDE ANHYDROUS AND DEXTROSE MONOHYDRATE .4; 5 G/100ML; G/100ML
1 INJECTION, SOLUTION INTRAVENOUS CONTINUOUS
Status: DISPENSED | OUTPATIENT
Start: 2022-09-12 | End: 2022-09-13

## 2022-09-12 RX ORDER — DIPHENHYDRAMINE HYDROCHLORIDE 50 MG/ML
25 INJECTION INTRAMUSCULAR; INTRAVENOUS EVERY 6 HOURS PRN
Status: DISCONTINUED | OUTPATIENT
Start: 2022-09-12 | End: 2022-09-13 | Stop reason: HOSPADM

## 2022-09-12 RX ORDER — MIRTAZAPINE 15 MG/1
30 TABLET, FILM COATED ORAL NIGHTLY
Status: DISCONTINUED | OUTPATIENT
Start: 2022-09-12 | End: 2022-09-13 | Stop reason: HOSPADM

## 2022-09-12 RX ORDER — BUPIVACAINE HYDROCHLORIDE 5 MG/ML
INJECTION, SOLUTION PERINEURAL PRN
Status: DISCONTINUED | OUTPATIENT
Start: 2022-09-12 | End: 2022-09-12 | Stop reason: ALTCHOICE

## 2022-09-12 RX ORDER — SODIUM CHLORIDE, SODIUM LACTATE, POTASSIUM CHLORIDE, CALCIUM CHLORIDE 600; 310; 30; 20 MG/100ML; MG/100ML; MG/100ML; MG/100ML
INJECTION, SOLUTION INTRAVENOUS CONTINUOUS
Status: DISCONTINUED | OUTPATIENT
Start: 2022-09-12 | End: 2022-09-12 | Stop reason: HOSPADM

## 2022-09-12 RX ORDER — TAMSULOSIN HYDROCHLORIDE 0.4 MG/1
0.4 CAPSULE ORAL DAILY
Status: DISCONTINUED | OUTPATIENT
Start: 2022-09-13 | End: 2022-09-13 | Stop reason: HOSPADM

## 2022-09-12 RX ORDER — OXYCODONE HYDROCHLORIDE 5 MG/1
5 TABLET ORAL PRN
Status: DISCONTINUED | OUTPATIENT
Start: 2022-09-12 | End: 2022-09-12 | Stop reason: HOSPADM

## 2022-09-12 RX ORDER — FENTANYL CITRATE 50 UG/ML
INJECTION, SOLUTION INTRAMUSCULAR; INTRAVENOUS PRN
Status: DISCONTINUED | OUTPATIENT
Start: 2022-09-12 | End: 2022-09-12 | Stop reason: SDUPTHER

## 2022-09-12 RX ORDER — ONDANSETRON 2 MG/ML
4 INJECTION INTRAMUSCULAR; INTRAVENOUS
Status: DISCONTINUED | OUTPATIENT
Start: 2022-09-12 | End: 2022-09-12 | Stop reason: HOSPADM

## 2022-09-12 RX ORDER — ACETAMINOPHEN 500 MG
1000 TABLET ORAL ONCE
Status: COMPLETED | OUTPATIENT
Start: 2022-09-12 | End: 2022-09-12

## 2022-09-12 RX ORDER — SODIUM CHLORIDE 0.9 % (FLUSH) 0.9 %
5-40 SYRINGE (ML) INJECTION PRN
Status: DISCONTINUED | OUTPATIENT
Start: 2022-09-12 | End: 2022-09-12 | Stop reason: HOSPADM

## 2022-09-12 RX ORDER — ONDANSETRON 2 MG/ML
4 INJECTION INTRAMUSCULAR; INTRAVENOUS EVERY 6 HOURS PRN
Status: DISCONTINUED | OUTPATIENT
Start: 2022-09-12 | End: 2022-09-13 | Stop reason: HOSPADM

## 2022-09-12 RX ORDER — FENTANYL CITRATE 50 UG/ML
100 INJECTION, SOLUTION INTRAMUSCULAR; INTRAVENOUS
Status: DISCONTINUED | OUTPATIENT
Start: 2022-09-12 | End: 2022-09-12 | Stop reason: HOSPADM

## 2022-09-12 RX ORDER — KETAMINE HYDROCHLORIDE 50 MG/ML
INJECTION, SOLUTION, CONCENTRATE INTRAMUSCULAR; INTRAVENOUS PRN
Status: DISCONTINUED | OUTPATIENT
Start: 2022-09-12 | End: 2022-09-12 | Stop reason: SDUPTHER

## 2022-09-12 RX ORDER — SUCRALFATE 1 G/1
1 TABLET ORAL EVERY 6 HOURS SCHEDULED
Status: DISCONTINUED | OUTPATIENT
Start: 2022-09-12 | End: 2022-09-13 | Stop reason: HOSPADM

## 2022-09-12 RX ORDER — SODIUM CHLORIDE 0.9 % (FLUSH) 0.9 %
5-40 SYRINGE (ML) INJECTION EVERY 12 HOURS SCHEDULED
Status: DISCONTINUED | OUTPATIENT
Start: 2022-09-12 | End: 2022-09-12 | Stop reason: HOSPADM

## 2022-09-12 RX ORDER — METOCLOPRAMIDE HYDROCHLORIDE 5 MG/ML
10 INJECTION INTRAMUSCULAR; INTRAVENOUS
Status: DISCONTINUED | OUTPATIENT
Start: 2022-09-12 | End: 2022-09-12 | Stop reason: HOSPADM

## 2022-09-12 RX ORDER — APREPITANT 40 MG/1
40 CAPSULE ORAL ONCE
Status: COMPLETED | OUTPATIENT
Start: 2022-09-12 | End: 2022-09-12

## 2022-09-12 RX ORDER — MIDAZOLAM HYDROCHLORIDE 2 MG/2ML
2 INJECTION, SOLUTION INTRAMUSCULAR; INTRAVENOUS
Status: COMPLETED | OUTPATIENT
Start: 2022-09-12 | End: 2022-09-12

## 2022-09-12 RX ORDER — PROCHLORPERAZINE EDISYLATE 5 MG/ML
10 INJECTION INTRAMUSCULAR; INTRAVENOUS EVERY 6 HOURS PRN
Status: DISCONTINUED | OUTPATIENT
Start: 2022-09-12 | End: 2022-09-13 | Stop reason: HOSPADM

## 2022-09-12 RX ORDER — GLYCOPYRROLATE 0.2 MG/ML
INJECTION INTRAMUSCULAR; INTRAVENOUS PRN
Status: DISCONTINUED | OUTPATIENT
Start: 2022-09-12 | End: 2022-09-12 | Stop reason: SDUPTHER

## 2022-09-12 RX ORDER — DIPHENHYDRAMINE HYDROCHLORIDE 50 MG/ML
12.5 INJECTION INTRAMUSCULAR; INTRAVENOUS
Status: DISCONTINUED | OUTPATIENT
Start: 2022-09-12 | End: 2022-09-12 | Stop reason: HOSPADM

## 2022-09-12 RX ORDER — OXYCODONE HYDROCHLORIDE 5 MG/1
5 TABLET ORAL EVERY 4 HOURS PRN
Status: DISCONTINUED | OUTPATIENT
Start: 2022-09-12 | End: 2022-09-13 | Stop reason: HOSPADM

## 2022-09-12 RX ORDER — SODIUM CHLORIDE 0.9 % (FLUSH) 0.9 %
5-40 SYRINGE (ML) INJECTION EVERY 12 HOURS SCHEDULED
Status: DISCONTINUED | OUTPATIENT
Start: 2022-09-12 | End: 2022-09-13 | Stop reason: HOSPADM

## 2022-09-12 RX ORDER — SCOLOPAMINE TRANSDERMAL SYSTEM 1 MG/1
1 PATCH, EXTENDED RELEASE TRANSDERMAL
Status: DISCONTINUED | OUTPATIENT
Start: 2022-09-12 | End: 2022-09-12

## 2022-09-12 RX ORDER — ACETAMINOPHEN 500 MG
1000 TABLET ORAL EVERY 6 HOURS
Status: DISCONTINUED | OUTPATIENT
Start: 2022-09-12 | End: 2022-09-13 | Stop reason: HOSPADM

## 2022-09-12 RX ORDER — PROPOFOL 10 MG/ML
INJECTION, EMULSION INTRAVENOUS PRN
Status: DISCONTINUED | OUTPATIENT
Start: 2022-09-12 | End: 2022-09-12 | Stop reason: SDUPTHER

## 2022-09-12 RX ORDER — DULOXETIN HYDROCHLORIDE 60 MG/1
60 CAPSULE, DELAYED RELEASE ORAL DAILY
Status: DISCONTINUED | OUTPATIENT
Start: 2022-09-13 | End: 2022-09-13 | Stop reason: HOSPADM

## 2022-09-12 RX ORDER — HEPARIN SODIUM 5000 [USP'U]/ML
5000 INJECTION, SOLUTION INTRAVENOUS; SUBCUTANEOUS EVERY 8 HOURS SCHEDULED
Status: DISCONTINUED | OUTPATIENT
Start: 2022-09-12 | End: 2022-09-13 | Stop reason: HOSPADM

## 2022-09-12 RX ORDER — LIDOCAINE HYDROCHLORIDE ANHYDROUS AND DEXTROSE MONOHYDRATE .4; 5 G/100ML; G/100ML
INJECTION, SOLUTION INTRAVENOUS CONTINUOUS PRN
Status: DISCONTINUED | OUTPATIENT
Start: 2022-09-12 | End: 2022-09-12 | Stop reason: SDUPTHER

## 2022-09-12 RX ORDER — METOCLOPRAMIDE HYDROCHLORIDE 5 MG/ML
10 INJECTION INTRAMUSCULAR; INTRAVENOUS ONCE
Status: COMPLETED | OUTPATIENT
Start: 2022-09-12 | End: 2022-09-12

## 2022-09-12 RX ORDER — SIMETHICONE 80 MG
80 TABLET,CHEWABLE ORAL EVERY 6 HOURS PRN
Status: DISCONTINUED | OUTPATIENT
Start: 2022-09-12 | End: 2022-09-13 | Stop reason: HOSPADM

## 2022-09-12 RX ORDER — SCOLOPAMINE TRANSDERMAL SYSTEM 1 MG/1
1 PATCH, EXTENDED RELEASE TRANSDERMAL
Status: DISCONTINUED | OUTPATIENT
Start: 2022-09-12 | End: 2022-09-12 | Stop reason: SDUPTHER

## 2022-09-12 RX ORDER — ONDANSETRON 2 MG/ML
4 INJECTION INTRAMUSCULAR; INTRAVENOUS ONCE
Status: DISCONTINUED | OUTPATIENT
Start: 2022-09-12 | End: 2022-09-12 | Stop reason: HOSPADM

## 2022-09-12 RX ORDER — HYDROMORPHONE HYDROCHLORIDE 1 MG/ML
0.5 INJECTION, SOLUTION INTRAMUSCULAR; INTRAVENOUS; SUBCUTANEOUS EVERY 10 MIN PRN
Status: COMPLETED | OUTPATIENT
Start: 2022-09-12 | End: 2022-09-12

## 2022-09-12 RX ORDER — DIPHENHYDRAMINE HCL 25 MG
25 CAPSULE ORAL EVERY 6 HOURS PRN
Status: DISCONTINUED | OUTPATIENT
Start: 2022-09-12 | End: 2022-09-13 | Stop reason: HOSPADM

## 2022-09-12 RX ORDER — GABAPENTIN 300 MG/1
300 CAPSULE ORAL 3 TIMES DAILY
Status: DISCONTINUED | OUTPATIENT
Start: 2022-09-12 | End: 2022-09-13 | Stop reason: HOSPADM

## 2022-09-12 RX ORDER — ONDANSETRON 2 MG/ML
INJECTION INTRAMUSCULAR; INTRAVENOUS PRN
Status: DISCONTINUED | OUTPATIENT
Start: 2022-09-12 | End: 2022-09-12 | Stop reason: SDUPTHER

## 2022-09-12 RX ORDER — KETOROLAC TROMETHAMINE 30 MG/ML
30 INJECTION, SOLUTION INTRAMUSCULAR; INTRAVENOUS EVERY 6 HOURS
Status: COMPLETED | OUTPATIENT
Start: 2022-09-12 | End: 2022-09-12

## 2022-09-12 RX ORDER — ROCURONIUM BROMIDE 10 MG/ML
INJECTION, SOLUTION INTRAVENOUS PRN
Status: DISCONTINUED | OUTPATIENT
Start: 2022-09-12 | End: 2022-09-12 | Stop reason: SDUPTHER

## 2022-09-12 RX ADMIN — Medication 3000 MG: at 07:39

## 2022-09-12 RX ADMIN — OXYCODONE 5 MG: 5 TABLET ORAL at 15:27

## 2022-09-12 RX ADMIN — LIDOCAINE HYDROCHLORIDE 1 MG/KG/HR: 4 INJECTION, SOLUTION INTRAVENOUS at 11:31

## 2022-09-12 RX ADMIN — SUCRALFATE 1 G: 1 TABLET ORAL at 11:31

## 2022-09-12 RX ADMIN — SODIUM CHLORIDE, PRESERVATIVE FREE 10 ML: 5 INJECTION INTRAVENOUS at 22:14

## 2022-09-12 RX ADMIN — MIRTAZAPINE 30 MG: 15 TABLET, FILM COATED ORAL at 22:10

## 2022-09-12 RX ADMIN — HYDROMORPHONE HYDROCHLORIDE 0.5 MG: 1 INJECTION, SOLUTION INTRAMUSCULAR; INTRAVENOUS; SUBCUTANEOUS at 10:02

## 2022-09-12 RX ADMIN — KETOROLAC TROMETHAMINE 30 MG: 30 INJECTION, SOLUTION INTRAMUSCULAR at 23:55

## 2022-09-12 RX ADMIN — DEXAMETHASONE SODIUM PHOSPHATE 10 MG: 10 INJECTION INTRAMUSCULAR; INTRAVENOUS at 08:22

## 2022-09-12 RX ADMIN — ACETAMINOPHEN 1000 MG: 500 TABLET, FILM COATED ORAL at 11:32

## 2022-09-12 RX ADMIN — SODIUM CHLORIDE, SODIUM LACTATE, POTASSIUM CHLORIDE, AND CALCIUM CHLORIDE: 600; 310; 30; 20 INJECTION, SOLUTION INTRAVENOUS at 06:28

## 2022-09-12 RX ADMIN — SUCRALFATE 1 G: 1 TABLET ORAL at 17:40

## 2022-09-12 RX ADMIN — MIDAZOLAM HYDROCHLORIDE 2 MG: 1 INJECTION, SOLUTION INTRAMUSCULAR; INTRAVENOUS at 07:09

## 2022-09-12 RX ADMIN — FOLIC ACID: 5 INJECTION, SOLUTION INTRAMUSCULAR; INTRAVENOUS; SUBCUTANEOUS at 14:26

## 2022-09-12 RX ADMIN — POTASSIUM CHLORIDE AND SODIUM CHLORIDE: 900; 150 INJECTION, SOLUTION INTRAVENOUS at 12:16

## 2022-09-12 RX ADMIN — ACETAMINOPHEN 1000 MG: 500 TABLET, FILM COATED ORAL at 23:55

## 2022-09-12 RX ADMIN — OXYMETAZOLINE HCL 2 SPRAY: 0.05 SPRAY NASAL at 07:09

## 2022-09-12 RX ADMIN — POTASSIUM CHLORIDE AND SODIUM CHLORIDE: 900; 150 INJECTION, SOLUTION INTRAVENOUS at 23:54

## 2022-09-12 RX ADMIN — GABAPENTIN 300 MG: 300 CAPSULE ORAL at 14:26

## 2022-09-12 RX ADMIN — ACETAMINOPHEN 1000 MG: 500 TABLET, FILM COATED ORAL at 17:40

## 2022-09-12 RX ADMIN — ONDANSETRON 4 MG: 2 INJECTION INTRAMUSCULAR; INTRAVENOUS at 08:22

## 2022-09-12 RX ADMIN — GABAPENTIN 300 MG: 300 CAPSULE ORAL at 22:09

## 2022-09-12 RX ADMIN — ROCURONIUM BROMIDE 50 MG: 50 INJECTION, SOLUTION INTRAVENOUS at 08:06

## 2022-09-12 RX ADMIN — HYDROMORPHONE HYDROCHLORIDE 0.5 MG: 1 INJECTION, SOLUTION INTRAMUSCULAR; INTRAVENOUS; SUBCUTANEOUS at 09:36

## 2022-09-12 RX ADMIN — GLYCOPYRROLATE 0.1 MG: 0.2 INJECTION, SOLUTION INTRAMUSCULAR; INTRAVENOUS at 08:28

## 2022-09-12 RX ADMIN — SODIUM CHLORIDE, PRESERVATIVE FREE 10 ML: 5 INJECTION INTRAVENOUS at 11:32

## 2022-09-12 RX ADMIN — KETOROLAC TROMETHAMINE 30 MG: 30 INJECTION, SOLUTION INTRAMUSCULAR at 11:31

## 2022-09-12 RX ADMIN — ACETAMINOPHEN 1000 MG: 500 TABLET, FILM COATED ORAL at 06:12

## 2022-09-12 RX ADMIN — APREPITANT 40 MG: 40 CAPSULE ORAL at 06:12

## 2022-09-12 RX ADMIN — METOCLOPRAMIDE 10 MG: 5 INJECTION, SOLUTION INTRAMUSCULAR; INTRAVENOUS at 06:12

## 2022-09-12 RX ADMIN — SUGAMMADEX 200 MG: 100 INJECTION, SOLUTION INTRAVENOUS at 09:17

## 2022-09-12 RX ADMIN — KETOROLAC TROMETHAMINE 30 MG: 30 INJECTION, SOLUTION INTRAMUSCULAR at 17:40

## 2022-09-12 RX ADMIN — HYDROMORPHONE HYDROCHLORIDE 0.5 MG: 1 INJECTION, SOLUTION INTRAMUSCULAR; INTRAVENOUS; SUBCUTANEOUS at 10:16

## 2022-09-12 RX ADMIN — PROPOFOL 200 MG: 10 INJECTION, EMULSION INTRAVENOUS at 07:57

## 2022-09-12 RX ADMIN — LIDOCAINE HYDROCHLORIDE 1 MG/KG/HR: 4 INJECTION, SOLUTION INTRAVENOUS at 08:04

## 2022-09-12 RX ADMIN — HYDROMORPHONE HYDROCHLORIDE 0.5 MG: 1 INJECTION, SOLUTION INTRAMUSCULAR; INTRAVENOUS; SUBCUTANEOUS at 09:49

## 2022-09-12 RX ADMIN — SUCRALFATE 1 G: 1 TABLET ORAL at 23:55

## 2022-09-12 RX ADMIN — KETAMINE HYDROCHLORIDE 50 MG: 50 INJECTION, SOLUTION INTRAMUSCULAR; INTRAVENOUS at 08:03

## 2022-09-12 RX ADMIN — HEPARIN SODIUM 5000 UNITS: 5000 INJECTION INTRAVENOUS; SUBCUTANEOUS at 22:10

## 2022-09-12 RX ADMIN — Medication 180 MG: at 07:57

## 2022-09-12 RX ADMIN — FENTANYL CITRATE 100 MCG: 50 INJECTION, SOLUTION INTRAMUSCULAR; INTRAVENOUS at 07:57

## 2022-09-12 ASSESSMENT — PAIN DESCRIPTION - LOCATION
LOCATION: ABDOMEN

## 2022-09-12 ASSESSMENT — PAIN SCALES - GENERAL
PAINLEVEL_OUTOF10: 6
PAINLEVEL_OUTOF10: 6
PAINLEVEL_OUTOF10: 7
PAINLEVEL_OUTOF10: 9
PAINLEVEL_OUTOF10: 7
PAINLEVEL_OUTOF10: 6
PAINLEVEL_OUTOF10: 7
PAINLEVEL_OUTOF10: 7
PAINLEVEL_OUTOF10: 9
PAINLEVEL_OUTOF10: 10
PAINLEVEL_OUTOF10: 6
PAINLEVEL_OUTOF10: 10
PAINLEVEL_OUTOF10: 10

## 2022-09-12 ASSESSMENT — PAIN DESCRIPTION - DESCRIPTORS
DESCRIPTORS: SHARP
DESCRIPTORS: ACHING
DESCRIPTORS: SHARP
DESCRIPTORS: ACHING

## 2022-09-12 NOTE — ANESTHESIA PRE PROCEDURE
Reconciliation   Magnesium 500 MG CAPS Take 500 mg by mouth daily    Ar Automatic Reconciliation   mirtazapine (REMERON) 30 MG tablet Take 30 mg by mouth nightly 10/13/21   Ar Automatic Reconciliation   naproxen (EC NAPROSYN) 500 MG EC tablet Take 500 mg by mouth as needed    Ar Automatic Reconciliation   tamsulosin (FLOMAX) 0.4 MG capsule TAKE 1 CAPSULE NIGHTLY 12/17/21   Ar Automatic Reconciliation       Current medications:    Current Facility-Administered Medications   Medication Dose Route Frequency Provider Last Rate Last Admin    ceFAZolin (ANCEF) 3000 mg in sterile water 30 mL IV syringe  3,000 mg IntraVENous On Call to [address]        lactated ringers infusion   IntraVENous Continuous MELE Cerda        sodium chloride flush 0.9 % injection 5-40 mL  5-40 mL IntraVENous 2 times per day MELE Cerda        sodium chloride flush 0.9 % injection 5-40 mL  5-40 mL IntraVENous PRN MELE Cerda        0.9 % sodium chloride infusion   IntraVENous PRN MELE Cerda        ondansetron (ZOFRAN) injection 4 mg  4 mg IntraVENous Once MELE Cerda        scopolamine (TRANSDERM-SCOP) transdermal patch 1 patch  1 patch TransDERmal Q72H MELE Cerda   1 patch at 09/12/22 3571    fentaNYL (SUBLIMAZE) injection 100 mcg  100 mcg IntraVENous Once PRN Hetal Chong MD        lactated ringers infusion   IntraVENous Continuous Hetal Chong  mL/hr at 09/12/22 0628 New Bag at 09/12/22 8672    sodium chloride flush 0.9 % injection 5-40 mL  5-40 mL IntraVENous 2 times per day Hetal Chong MD        sodium chloride flush 0.9 % injection 5-40 mL  5-40 mL IntraVENous PRN Hetal Chong MD        midazolam PF (VERSED) injection 2 mg  2 mg IntraVENous Once PRN Hetal Chong MD           Allergies:     Allergies   Allergen Reactions    Bee Venom Shortness Of Breath and Swelling     Carried epi pen    Wasp Venom Protein Anaphylaxis    Morphine Other (See Comments)     \"makes me mean\"  Other reaction(s): Altered Mental State-Intolerance       Problem List:    Patient Active Problem List   Diagnosis Code    Low serum testosterone level R79.89    Sleep apnea G47.30    Body mass index (BMI) 50.0-59.9, adult ([facility]) Z68.43    Neuropathy of both feet G57.93    Controlled type 2 diabetes mellitus without complication, without long-term current use of insulin ([facility]) E11.9    Varicose veins of both lower extremities with inflammation I83.11, I83.12    Nausea in adult R11.0    Gout M10.9    Diastasis of rectus abdominis M62.08    H/O prostatitis Z87.438    DDD (degenerative disc disease), lumbar M51.36    Obesity, morbid ([address] E66.01    Multiple polyps of ethmoid sinus J33.8    Spinal stenosis M48.00    Restless leg G25.81    Type 2 diabetes mellitus with diabetic neuropathy ([facility]) E11.40    Depression F32. A    Hip arthritis M16.10    Peptic ulcer K27.9    Anxiety F41.9    Arthritis of right acromioclavicular joint M19.011    Bilateral hip joint arthritis M16.0    Lumbar radiculopathy, chronic M54.16    Reflux [id number]    Subacromial impingement, right M75.41    Personal history of peptic ulcer disease Z87.11    Morbid obesity ([address] E66.01       Past Medical History:        Diagnosis Date    Arthritis     osteo - in back, hips    Benign prostatic hyperplasia     Chronic pain     hips, neck, back    Diabetes mellitus ([address]     Controlled type 2 diabetes mellitus without complication, without long-term current use of insulin;  no bs checks at home; no meds; A1c=6.3 on 7/7/22    Fatty liver     Former smoker     GERD (gastroesophageal reflux disease)     on med for control    History of staph infection 1996    appendix ruptured    Hypertension     on med for control    Ill-defined disease     swelling of both lower legs- SCIATICA- legs feel like \"burning\"    Impaired mobility     walks with cane    Insomnia  Morbid obesity (HCC)     BMI=51.81    Other ill-defined conditions(799.89)     stuffy nose/ infection    Other ill-defined conditions(799.89)     spinal stenosis    Other ill-defined conditions(799.89)     several kidney stones-passed on own    Peptic ulcer 10/11/2013    Peripheral neuropathy     hands and feet    Unspecified sleep apnea     c-pap nightly; instructed to bring dos       Past Surgical History:        Procedure Laterality Date    APPENDECTOMY      COLONOSCOPY      ENDOSCOPY, COLON, DIAGNOSTIC      EYE SURGERY Left     SINUS SURGERY      sinus surgery; nasal polyps removed - Dr. Nereida Gordillo N/A 2022    EGD BIOPSY performed by Wolfgang Cabrera MD at [address] [place] [address] Dr Roberts        Social History:    Social History     Tobacco Use    Smoking status: Former     Packs/day: 1.00     Years: 25.00     Pack years: 25.00     Types: Cigarettes     Start date:      Quit date: 10/11/2004     Years since quittin.9    Smokeless tobacco: Never   Substance Use Topics    Alcohol use: Yes     Comment: drink every two months or so                                Counseling given: Not Answered      Vital Signs (Current):   Vitals:    22 0846 22 0530   BP:  (!) 163/98   Pulse:  96   Resp:  18   Temp:  97.7 °F (36.5 °C)   TempSrc:  Temporal   SpO2:  97%   Weight: (!) 382 lb (173.3 kg) (!) 378 lb 6.4 oz (171.6 kg)   Height: 6' (1.829 m)                                               BP Readings from Last 3 Encounters:   22 (!) 163/98   22 120/72   22 (!) 148/82       NPO Status: Time of last liquid consumption:                         Time of last solid consumption:                         Date of last liquid consumption: 22                        Date of last solid food consumption: 22    BMI:   Wt Readings from Last 3 Encounters:   22 (!) 378 lb 6.4 oz (171.6 kg)   08/29/22 (!) 382 lb (173.3 kg)   08/16/22 (!) 375 lb (170.1 kg)     Body mass index is 51.32 kg/m². CBC:   Lab Results   Component Value Date/Time    HGB 13.2 08/29/2022 01:53 PM       CMP:   Lab Results   Component Value Date/Time     04/25/2022 11:47 AM    K 3.9 08/29/2022 01:53 PM     04/25/2022 11:47 AM    CO2 24 04/25/2022 11:47 AM    BUN 20 04/25/2022 11:47 AM    CREATININE 0.78 08/29/2022 01:53 PM    GFRAA 116 10/13/2021 11:10 AM    AGRATIO 1.5 04/25/2022 11:47 AM    GLUCOSE 97 04/25/2022 11:47 AM    PROT 7.0 04/25/2022 11:47 AM    CALCIUM 9.0 04/25/2022 11:47 AM    BILITOT 0.3 04/25/2022 11:47 AM    ALKPHOS 116 04/25/2022 11:47 AM    AST 10 04/25/2022 11:47 AM    ALT 26 04/25/2022 11:47 AM       POC Tests: No results for input(s): POCGLU, POCNA, POCK, POCCL, POCBUN, POCHEMO, POCHCT in the last 72 hours. Coags: No results found for: PROTIME, INR, APTT    HCG (If Applicable): No results found for: PREGTESTUR, PREGSERUM, HCG, HCGQUANT     ABGs: No results found for: PHART, PO2ART, WVN5XBG, VQD3PFX, BEART, D5TYXEBB     Type & Screen (If Applicable):  No results found for: LABABO, LABRH    Drug/Infectious Status (If Applicable):  No results found for: HIV, HEPCAB    COVID-19 Screening (If Applicable): No results found for: COVID19        Anesthesia Evaluation  Patient summary reviewed and Nursing notes reviewed  Airway: Mallampati: III  TM distance: >3 FB   Neck ROM: full     Dental:          Pulmonary:Negative Pulmonary ROS and normal exam    (+) sleep apnea:                             Cardiovascular:  Exercise tolerance: good (>4 METS),   (+) hypertension:,         Rhythm: regular  Rate: normal                    Neuro/Psych:   Negative Neuro/Psych ROS              GI/Hepatic/Renal: Neg GI/Hepatic/Renal ROS  (+) GERD:, PUD, liver disease:, morbid obesity          Endo/Other: Negative Endo/Other ROS   (+) Diabetes, .           Pt had no PAT visit       Abdominal:

## 2022-09-12 NOTE — PLAN OF CARE
Problem: Chronic Conditions and Co-morbidities  Goal: Patient's chronic conditions and co-morbidity symptoms are monitored and maintained or improved  Outcome: Progressing     Problem: Safety - Adult  Goal: Free from fall injury  Outcome: Progressing     Problem: Discharge Planning  Goal: Discharge to home or other facility with appropriate resources  Outcome: Progressing     Problem: Pain  Goal: Verbalizes/displays adequate comfort level or baseline comfort level  Outcome: Progressing

## 2022-09-12 NOTE — ANESTHESIA POSTPROCEDURE EVALUATION
Department of Anesthesiology  Postprocedure Note    Patient: Miracle Sullivan  MRN: [id number]  YOB: 1965  Date of evaluation: 9/12/2022      Procedure Summary     Date: 09/12/22 Room / Location: [facility] MAIN OR 04 / E MAIN OR    Anesthesia Start: 0728 Anesthesia Stop: 0744    Procedure: ERAS/ GASTRECTOMY SLEEVE LAPAROSCOPIC - DIFFICULT AIRWAY SUSPECTED (Abdomen) Diagnosis:       Morbid obesity ([address]      (Morbid obesity ([address] [E66.01])    Surgeons: Miladis Cox MD Responsible Provider: Pedro Nix MD    Anesthesia Type: general ASA Status: 3          Anesthesia Type: No value filed.     Natasha Phase I: Natasha Score: 9    Natasha Phase II:        Anesthesia Post Evaluation    Patient location during evaluation: PACU  Patient participation: complete - patient participated  Level of consciousness: awake and awake and alert  Airway patency: patent  Nausea & Vomiting: no nausea  Complications: no  Cardiovascular status: hemodynamically stable  Respiratory status: acceptable  Hydration status: euvolemic  Multimodal analgesia pain management approach

## 2022-09-12 NOTE — PERIOP NOTE
TRANSFER - OUT REPORT:    Verbal report given to Robbin Nicole RN on Radha Sessions  being transferred to [address] [place] [address] for routine progression of patient care       Report consisted of patient's Situation, Background, Assessment and   Recommendations(SBAR). Information from the following report(s) Nurse Handoff Report, Surgery Report, and MAR was reviewed with the receiving nurse. Lines:   Peripheral IV 09/12/22 Right;Posterior Hand (Active)   Site Assessment Clean;Dry; Intact 09/12/22 1035   Line Status Infusing 09/12/22 1035   Phlebitis Assessment No symptoms 09/12/22 1035   Infiltration Assessment 0 09/12/22 1035   Dressing Status Clean, dry & intact 09/12/22 1035   Dressing Type Transparent 09/12/22 1035        Opportunity for questions and clarification was provided.       Patient transported with:  O2 @ 3lpm and Tech

## 2022-09-12 NOTE — PROGRESS NOTES
General Surgery Post-op Note    Patient: Gurjit Umaña  MRN: [id number]  Date: 9/12/2022 1:51 PM  Procedure: laparoscopic sleeve gastrectomy    Subjective:     Gurjit Umaña is a 61 y.o. male s/p sleeve gastrectomy completed today by Dr. Deb Rangel. Jack Coronado. He admits that he is stable but is experiencing moderate to severe abdominal pain. He denies nausea, vomiting or dry heaving. He has ambulated to the restroom, voiding, and is tolerating small amounts of PO intake. Review of Systems   General ROS: negative for - fever or chills  Psychological ROS: negative for - memory difficulties  Ophthalmic ROS: negative for - blurry vision or double vision  Respiratory ROS: negative for - cough, shortness of breath, or wheezing  Cardiovascular ROS: negative for - chest pain  Gastrointestinal ROS: negative for - nausea, vomiting or dry heaving  Musculoskeletal ROS: negative for - pain in the lower extremities  Neurological ROS: negative for - dizziness, light headedness, headaches or numbness    Objective:     BP (!) 141/76   Pulse 72   Temp 97.9 °F (36.6 °C) (Oral)   Resp 18   Ht 6' (1.829 m)   Wt (!) 378 lb 6.4 oz (171.6 kg)   SpO2 96%   BMI 51.32 kg/m²   No intake/output data recorded. I/O this shift:  In: 2400 [I.V.:2400]  Out: 25 [Blood:25]    Exam:  General: Alert, oriented, cooperative in no acute distress. Resp:  Breathing is non-labored. CV: Regular rate and rhythm. Abd: Soft, not distended. Incision sites are dry, intact, without presence of erythema, infection, or allergic reaction. Plan:   Patient will remain in the hospital overnight. Discussed the use of incentive spirometer, early ambulation, and PO intake. Reviewed important signs and symptoms including chest pain, shortness of breath, increasing abdominal pain. All of their questions were answered, and the patient is happy with this plan. Plan for discharge 1-2 days depending upon symptoms and progress.      Active Hospital Problems Diagnosis Date Noted    Morbid obesity ([facility] [E66.01] 09/12/2022     Priority: Medium    Obesity, morbid ([facility] [E66.01] 11/30/2017       Sunil Arnett PA-C  Date: 9/12/2022    Counseling time:counseling time more than 50% of visit: 15 minutes: I spent this time preparing to see patient (including chart review and preparation), obtaining and/or reviewing additional medical history, performing a physical exam and evaluation, documenting clinical information in the electronic health record, independently interpreting results, communicating results to patient, family or caregiver, and/or coordinating care.

## 2022-09-12 NOTE — PROGRESS NOTES
TRANSFER - IN REPORT:    Verbal report received from RN on Fany Zulema  being received from post-op for routine progression of patient care      Report consisted of patient's Situation, Background, Assessment and   Recommendations(SBAR). Information from the following report(s) Nurse Handoff Report was reviewed with the receiving nurse. Opportunity for questions and clarification was provided. Assessment will be completed upon patient's arrival to unit and care will be assumed.

## 2022-09-12 NOTE — PROGRESS NOTES
PHYSICAL THERAPY Initial Assessment and PM  (Link to Caseload Tracking: PT Visit Days : 1  Acknowledge Orders  Time In/Out  PT Charge Capture  Rehab Caseload Tracker    Francoise Martinez is a 61 y.o. male   PRIMARY DIAGNOSIS: Obesity, morbid ([address]  Morbid obesity ([address] [E66.01]  Procedure(s) (LRB):  ERAS/ GASTRECTOMY SLEEVE LAPAROSCOPIC - DIFFICULT AIRWAY SUSPECTED (N/A)  Day of Surgery  Reason for Referral: Generalized Muscle Weakness (M62.81)  Other abnormalities of gait and mobility (R26.89)  Inpatient: Payor: Roseanne Marie / Plan: Elida Dickey / Product Type: *No Product type* /     ASSESSMENT:     REHAB RECOMMENDATIONS:   Recommendation to date pending progress:  Setting:  No further skilled therapy after discharge from hospital    Equipment:    Cane     ASSESSMENT:  Mr. Dimple Angulo presents with decreased functional mobility s/p Procedure(s) (LRB):  ERAS/ GASTRECTOMY SLEEVE LAPAROSCOPIC - DIFFICULT AIRWAY SUSPECTED (N/A)    He is normally independent, does use a cane. He needs bilateral audrey with limited rom was well as back problems which limits his gait distance normally. He has a 2 story home. He was up in the chair already. He performed exercises below. He ambulated in the jordan SBA using the IV pole as an assistive device. Mild SOB and fatigue after. He had no questions. He plans to go home tomorrow.      [facility] [address] [address] AM-PAC 6 Clicks Basic Mobility Inpatient Short Form  AM-PAC Mobility Inpatient   How much difficulty turning over in bed?: A Little  How much difficulty sitting down on / standing up from a chair with arms?: A Little  How much difficulty moving from lying on back to sitting on side of bed?: A Little  How much help from another person moving to and from a bed to a chair?: None  How much help from another person needed to walk in hospital room?: None  How much help from another person for climbing 3-5 steps with a railing?: A Little  AM-[facility] Inpatient Mobility Raw Score : 20  AM-PAC Inpatient T-Scale Score : 47.67  Mobility Inpatient CMS 0-100% Score: 35.83  Mobility Inpatient CMS G-Code Modifier : CJ    SUBJECTIVE:   Mr. Kendra Fagan states, \"hurting some\"     Social/Functional Lives With: Spouse  Type of Home: House  Home Layout: Two level  Home Equipment: Cane    OBJECTIVE:     PAIN: Ceferino Muskrat / O2: Douglas Emmer / Dwana Laughter / DRAINS:   Pre Treatment:          Post Treatment: did not rate, abdominal pain Vitals        Oxygen      IV    RESTRICTIONS/PRECAUTIONS:  Restrictions/Precautions: Fall Risk, Surgical Protocols                 GROSS EVALUATION: Intact Impaired (Comments):   AROM []  Decreased functional, hips limited   PROM []    Strength []  Decreased functional, hips limited   Balance [] Posture: Good  Sitting - Static: Good  Sitting - Dynamic: Fair  Standing - Static: Fair   Posture [] Rounded Shoulders  Trunk Flexion   Sensation []  Impaired feet   Coordination []      Tone []     Edema []    Activity Tolerance [] Patient Tolerated treatment well, Patient limited by fatigue, Patient limited by pain    []      COGNITION/  PERCEPTION: Intact Impaired (Comments):   Orientation [x]     Vision [x]     Hearing [x]     Cognition  [x]       MOBILITY: I Mod I S SBA CGA Min Mod Max Total  NT x2 Comments:   Bed Mobility    Rolling [] [] [] [] [] [] [] [] [] [] [] In chair   Supine to Sit [] [] [] [] [] [] [] [] [] [] []    Scooting [] [] [] [] [] [] [] [] [] [] []    Sit to Supine [] [] [] [] [] [] [] [] [] [] []    Transfers    Sit to Stand [] [] [] [x] [] [] [] [] [] [] []    Bed to Chair [] [] [] [x] [] [] [] [] [] [] []    Stand to Sit [] [] [] [x] [] [] [] [] [] [] []     [] [] [] [] [] [] [] [] [] [] []    I=Independent, Mod I=Modified Independent, S=Supervision, SBA=Standby Assistance, CGA=Contact Guard Assistance,   Min=Minimal Assistance, Mod=Moderate Assistance, Max=Maximal Assistance, Total=Total Assistance, NT=Not Tested    GAIT: I Mod I S SBA CGA Min Mod Max Total  NT x2 Comments:   Level of Assistance [] [] [] [x] [] [] [] [] [] [] []    Distance 120 feet    DME IV pole    Gait Quality Decreased julito , Decreased step clearance, Decreased step length, and Decreased stance    Weightbearing Status Restrictions/Precautions  Restrictions/Precautions: Fall Risk, Surgical Protocols    Stairs      I=Independent, Mod I=Modified Independent, S=Supervision, SBA=Standby Assistance, CGA=Contact Guard Assistance,   Min=Minimal Assistance, Mod=Moderate Assistance, Max=Maximal Assistance, Total=Total Assistance, NT=Not Tested    PLAN:   ACUTE PHYSICAL THERAPY GOALS:   (Developed with and agreed upon by patient and/or caregiver.)  STG:  (1.)Mr. Naranjo will move from supine to sit and sit to supine  with SUPERVISION within 7 treatment day(s). (2.)Mr. Naranjo will transfer from bed to chair and chair to bed with SUPERVISION using the least restrictive device within 7 treatment day(s). (3.)Mr. Naranjo will ambulate with SUPERVISION for 200 feet with the least restrictive device within 7 treatment day(s). (4)mr. Brittney Mota will go up a flight of steps CGA with rail in 7 days. (5)Mr. Naranjo will perform HEP with cues in 7 days.   ________________________________________________________________________________________________      FREQUENCY AND DURATION: Daily for duration of hospital stay or until stated goals are met, whichever comes first.    THERAPY PROGNOSIS: Good    PROBLEM LIST:   (Skilled intervention is medically necessary to address:)  Decreased Activity Tolerance  Decreased AROM/PROM  Decreased Balance  Decreased Coordination  Decreased Gait Ability  Decreased Safety Awareness  Decreased Strength  Decreased Transfer Abilities  Increased Pain INTERVENTIONS PLANNED:   (Benefits and precautions of physical therapy have been discussed with the patient.)  Therapeutic Activity  Therapeutic Exercise/HEP  Gait Training  Education       TREATMENT:   EVALUATION: LOW COMPLEXITY: (Untimed Charge)    TREATMENT:   Therapeutic Activity (25 Minutes): Therapeutic activity included Transfer Training, Ambulation on level ground, Sitting balance , Standing balance, and exercises to improve functional Activity tolerance, Balance, Coordination, Mobility, Strength, and ROM.     TREATMENT GRID:   Date:  9/12 Date:   Date:     Activity/Exercise Parameters Parameters Parameters   Ankle pumps 10     Long arc quad 10     Seated march 10     Elbow flex 10     Overhead press 10                     AFTER TREATMENT PRECAUTIONS: Bed/Chair Locked, Call light within reach, Chair, Needs within reach, and RN notified    INTERDISCIPLINARY COLLABORATION:  RN/ PCT    EDUCATION: Education Given To: Patient  Education Provided: Role of Therapy;Home Exercise Program  Education Method: Demonstration;Verbal  Education Outcome: Verbalized understanding;Demonstrated understanding    TIME IN/OUT:  Time In: 1330  Time Out: 99 Emilee Fairbanks  Minutes: 3 Sudhir Logan PT

## 2022-09-12 NOTE — OP NOTE
Laparascopic Sleeve Gastrectomy Operative Report    Date of Surgery: [unfilled]     Preoperative Diagnosis: Morbid Obesity with a BMI of 51    Postoperative Diagnosis: Same as the above    Procedure: Procedure(s):  ERAS/ GASTRECTOMY SLEEVE LAPAROSCOPIC - DIFFICULT AIRWAY SUSPECTED     Surgeon(s) and Role:     * Maeve Salazar MD - Primary     Anesthesia:  GETA plus local     Surgical Assistant: MELE Serra. Her expertise with an angled laparoscope, experience with bariatric procedures, retraction and help with closure were essential for this procedure. NAME OF PROCEDURE: LAPAROSCOPIC SLEEVE GASTRECTOMY    ESTIMATED BLOOD LOSS: 50 mL. SPECIMENS: Segment of the stomach. HISTORY: This is a 61 y.o. male who came to the surgical weight loss  clinic at [facility] of his own volition for consideration of bariatric surgery. The patient went to an information session, met with the dietician and psychologist. he came in and I discussed a gastric bypass versus a sleeve gastrectomy with the patient. I recommended a sleeve gastrectomy. The  patient agreed and signed a consent form. For risks, I mentioned risks of bleeding, infection, anesthesia, injury to the esophagus, stomach, small bowel, liver, spleen, large bowel. I also went through risks of myocardial infarction, pneumonia and leak from the staple line of the sleeve gastrectomy. I said that a leak could produce peritonitis, multi-system organ failure, and even death. There is a 1% nationwide mortality rate for this procedure. The patient understood and still wished to proceed. PROCEDURE IN DETAIL: The patient was brought to an operating room at the [address] [place] [address] where general endotracheal anesthesia was administered without complications. He received 2 grams of Ancef as prophylactic antibiotic coverage. The nursing staff applied sequential compression devices which were used throughout the procedure. The abdomen was prepped and draped in the usual sterile manner. An incision was made superior into the patient's left of the umbilicus overlying the left rectus abdominis muscle. An Optiview trocar was placed in the peritoneal cavity under direct vision with 0 degree 10 mm scope. Once in the peritoneal cavity, the abdomen was insufflated to 15 mmHg using carbon dioxide gas. The table was placed in reverse Trendelenburg position. A 5 and 15 mm trocar placed in the right upper quadrant under direct vision. A 5 mm trocar was placed in the epigastric region and then removed. Through this tract, a Kymberly liver retractor was placed and used to retract the left lobe of the liver throughout the remainder of the procedure. It was attached to an Omni self-retaining retracting device. A 5 mm trocar was placed in the left upper quadrant to be used for retraction throughout the procedure. We found the pylorus and measured 6 cm proximal to the pylorus in an area along the greater curvature. The nurse anesthetist placed a 40 Fr Visi-G bougie/calibration tube which was manipulated along the lesser curvature. The device was attached to suction which fixed it in place. We made a small aperture with the Enseal Trio device. We then took the gastrocolic ligament from this point all the way up to the left kamlesh of the diaphragm. We cleared off some posterior adhesions with the Enseal device as well. We then used the Sheyenne 60 stapling device. The first cartridge was a black staple cartridge. We were able to push the bougie into the pre-pyloric channel and along the lesser curvature, where it remained. The next  staple cartridges were green, as the stomach was very thick, until the stomach was completely divided along the calibration tube. We checked the staple line. The remnant of the stomach had a stitch of 0 Ethibond placed in the antrum area and the excised stomach was then placed into a large [employer].  The Endopouch was closed and brought up

## 2022-09-13 VITALS
TEMPERATURE: 97.3 F | HEIGHT: 72 IN | BODY MASS INDEX: 42.66 KG/M2 | DIASTOLIC BLOOD PRESSURE: 72 MMHG | OXYGEN SATURATION: 92 % | RESPIRATION RATE: 19 BRPM | WEIGHT: 315 LBS | HEART RATE: 55 BPM | SYSTOLIC BLOOD PRESSURE: 127 MMHG

## 2022-09-13 LAB
ANION GAP SERPL CALC-SCNC: 4 MMOL/L (ref 4–13)
BASOPHILS # BLD: 0 K/UL (ref 0–0.2)
BASOPHILS NFR BLD: 0 % (ref 0–2)
BUN SERPL-MCNC: 18 MG/DL (ref 6–23)
CALCIUM SERPL-MCNC: 8.3 MG/DL (ref 8.3–10.4)
CHLORIDE SERPL-SCNC: 107 MMOL/L (ref 101–110)
CO2 SERPL-SCNC: 29 MMOL/L (ref 21–32)
CREAT SERPL-MCNC: 0.78 MG/DL (ref 0.8–1.5)
DIFFERENTIAL METHOD BLD: ABNORMAL
EOSINOPHIL # BLD: 0 K/UL (ref 0–0.8)
EOSINOPHIL NFR BLD: 0 % (ref 0.5–7.8)
ERYTHROCYTE [DISTWIDTH] IN BLOOD BY AUTOMATED COUNT: 14.8 % (ref 11.9–14.6)
GLUCOSE SERPL-MCNC: 165 MG/DL (ref 65–100)
HCT VFR BLD AUTO: 37.6 % (ref 41.1–50.3)
HGB BLD-MCNC: 12 G/DL (ref 13.6–17.2)
IMM GRANULOCYTES # BLD AUTO: 0.1 K/UL (ref 0–0.5)
IMM GRANULOCYTES NFR BLD AUTO: 1 % (ref 0–5)
LYMPHOCYTES # BLD: 1 K/UL (ref 0.5–4.6)
LYMPHOCYTES NFR BLD: 12 % (ref 13–44)
MCH RBC QN AUTO: 26.4 PG (ref 26.1–32.9)
MCHC RBC AUTO-ENTMCNC: 31.9 G/DL (ref 31.4–35)
MCV RBC AUTO: 82.6 FL (ref 79.6–97.8)
MONOCYTES # BLD: 0.5 K/UL (ref 0.1–1.3)
MONOCYTES NFR BLD: 6 % (ref 4–12)
NEUTS SEG # BLD: 6.9 K/UL (ref 1.7–8.2)
NEUTS SEG NFR BLD: 81 % (ref 43–78)
NRBC # BLD: 0 K/UL (ref 0–0.2)
PLATELET # BLD AUTO: 193 K/UL (ref 150–450)
PMV BLD AUTO: 10.1 FL (ref 9.4–12.3)
POTASSIUM SERPL-SCNC: 4.2 MMOL/L (ref 3.5–5.1)
RBC # BLD AUTO: 4.55 M/UL (ref 4.23–5.6)
SODIUM SERPL-SCNC: 140 MMOL/L (ref 136–145)
WBC # BLD AUTO: 8.4 K/UL (ref 4.3–11.1)

## 2022-09-13 PROCEDURE — 36415 COLL VENOUS BLD VENIPUNCTURE: CPT

## 2022-09-13 PROCEDURE — A4216 STERILE WATER/SALINE, 10 ML: HCPCS | Performed by: PHYSICIAN ASSISTANT

## 2022-09-13 PROCEDURE — 80048 BASIC METABOLIC PNL TOTAL CA: CPT

## 2022-09-13 PROCEDURE — 85025 COMPLETE CBC W/AUTO DIFF WBC: CPT

## 2022-09-13 PROCEDURE — 6360000002 HC RX W HCPCS: Performed by: PHYSICIAN ASSISTANT

## 2022-09-13 PROCEDURE — 97530 THERAPEUTIC ACTIVITIES: CPT

## 2022-09-13 PROCEDURE — APPSS30 APP SPLIT SHARED TIME 16-30 MINUTES: Performed by: PHYSICIAN ASSISTANT

## 2022-09-13 PROCEDURE — C9113 INJ PANTOPRAZOLE SODIUM, VIA: HCPCS | Performed by: PHYSICIAN ASSISTANT

## 2022-09-13 PROCEDURE — 6370000000 HC RX 637 (ALT 250 FOR IP): Performed by: PHYSICIAN ASSISTANT

## 2022-09-13 PROCEDURE — 2580000003 HC RX 258: Performed by: PHYSICIAN ASSISTANT

## 2022-09-13 PROCEDURE — 6370000000 HC RX 637 (ALT 250 FOR IP): Performed by: SURGERY

## 2022-09-13 RX ADMIN — DULOXETINE HYDROCHLORIDE 60 MG: 60 CAPSULE, DELAYED RELEASE ORAL at 09:57

## 2022-09-13 RX ADMIN — GABAPENTIN 300 MG: 300 CAPSULE ORAL at 09:57

## 2022-09-13 RX ADMIN — TAMSULOSIN HYDROCHLORIDE 0.4 MG: 0.4 CAPSULE ORAL at 09:57

## 2022-09-13 RX ADMIN — ACETAMINOPHEN 1000 MG: 500 TABLET, FILM COATED ORAL at 05:39

## 2022-09-13 RX ADMIN — SUCRALFATE 1 G: 1 TABLET ORAL at 05:41

## 2022-09-13 RX ADMIN — HEPARIN SODIUM 5000 UNITS: 5000 INJECTION INTRAVENOUS; SUBCUTANEOUS at 05:42

## 2022-09-13 ASSESSMENT — PAIN SCALES - GENERAL
PAINLEVEL_OUTOF10: 0
PAINLEVEL_OUTOF10: 6

## 2022-09-13 ASSESSMENT — PAIN DESCRIPTION - LOCATION: LOCATION: ABDOMEN;INCISION

## 2022-09-13 ASSESSMENT — PAIN DESCRIPTION - DESCRIPTORS: DESCRIPTORS: ACHING;SORE

## 2022-09-13 NOTE — PROGRESS NOTES
Raw Score : 20  AM-PAC Inpatient T-Scale Score : 47.67  Mobility Inpatient CMS 0-100% Score: 35.83  Mobility Inpatient CMS G-Code Modifier : CJ    SUBJECTIVE:   Mr. Kendra Fagan states, \"I am leaving today\"    Social/Functional Lives With: Spouse  Type of Home: House  Home Layout: Two level  Home Equipment: Cane  ADL Assistance: Independent  Mode of Transportation: Car    OBJECTIVE:     PAIN: Ceferino Muskrat / O2: Douglas Emmer / Dwana Laughter / Stephan Curly:   Pre Treatment:          Post Treatment: did not rate, abdominal pain Vitals        Oxygen      IV    RESTRICTIONS/PRECAUTIONS:  Restrictions/Precautions: Fall Risk, Surgical Protocols                 GROSS EVALUATION: Intact Impaired (Comments):   AROM []  Decreased functional, hips limited   PROM []    Strength []  Decreased functional, hips limited   Balance []     Posture [] Rounded Shoulders  Trunk Flexion   Sensation []  Impaired feet   Coordination []      Tone []     Edema []    Activity Tolerance []      []      COGNITION/  PERCEPTION: Intact Impaired (Comments):   Orientation [x]     Vision [x]     Hearing [x]     Cognition  [x]       MOBILITY: I Mod I S SBA CGA Min Mod Max Total  NT x2 Comments:   Bed Mobility    Rolling [x] [] [] [] [] [] [] [] [] [] [] In chair   Supine to Sit [x] [] [] [] [] [] [] [] [] [] []    Scooting [x] [] [] [] [] [] [] [] [] [] []    Sit to Supine [x] [] [] [] [] [] [] [] [] [] []    Transfers    Sit to Stand [] [] [] [x] [] [] [] [] [] [] []    Bed to Chair [] [] [] [x] [] [] [] [] [] [] []    Stand to Sit [] [] [] [x] [] [] [] [] [] [] []     [] [] [] [] [] [] [] [] [] [] []    I=Independent, Mod I=Modified Independent, S=Supervision, SBA=Standby Assistance, CGA=Contact Guard Assistance,   Min=Minimal Assistance, Mod=Moderate Assistance, Max=Maximal Assistance, Total=Total Assistance, NT=Not Tested    GAIT: I Mod I S SBA CGA Min Mod Max Total  NT x2 Comments:   Level of Assistance [] [] [] [x] [] [] [] [] [] [] []    Distance 100 feet    DME SPC    Gait Quality Decreased julito , Decreased step clearance, Decreased step length, and Decreased stance    Weightbearing Status      Stairs      I=Independent, Mod I=Modified Independent, S=Supervision, SBA=Standby Assistance, CGA=Contact Guard Assistance,   Min=Minimal Assistance, Mod=Moderate Assistance, Max=Maximal Assistance, Total=Total Assistance, NT=Not Tested    PLAN:   ACUTE PHYSICAL THERAPY GOALS:   (Developed with and agreed upon by patient and/or caregiver.)  STG:  (1.)Mr. Naranjo will move from supine to sit and sit to supine  with SUPERVISION within 7 treatment day(s). (2.)Mr. Naranjo will transfer from bed to chair and chair to bed with SUPERVISION using the least restrictive device within 7 treatment day(s). (3.)Mr. Naranjo will ambulate with SUPERVISION for 200 feet with the least restrictive device within 7 treatment day(s). (4)mr. Kwame Mary will go up a flight of steps CGA with rail in 7 days. (5)Mr. Naranjo will perform HEP with cues in 7 days. ________________________________________________________________________________________________      FREQUENCY AND DURATION: Daily for duration of hospital stay or until stated goals are met, whichever comes first.    THERAPY PROGNOSIS: Good    PROBLEM LIST:   (Skilled intervention is medically necessary to address:)  Decreased Activity Tolerance  Decreased AROM/PROM  Decreased Balance  Decreased Coordination  Decreased Gait Ability  Decreased Safety Awareness  Decreased Strength  Decreased Transfer Abilities  Increased Pain INTERVENTIONS PLANNED:   (Benefits and precautions of physical therapy have been discussed with the patient.)  Therapeutic Activity  Therapeutic Exercise/HEP  Gait Training  Education       TREATMENT:   EVALUATION: LOW COMPLEXITY: (Untimed Charge)    TREATMENT:   Therapeutic Activity (23 Minutes):  Therapeutic activity included Transfer Training, Ambulation on level ground, Sitting balance , and Standing balance to improve functional Activity tolerance, Balance, Coordination, Mobility, Strength, and ROM.     TREATMENT GRID:   Date:   Date:     Date:     Activity/Exercise Parameters Parameters Parameters   Ankle pumps 10 12    Long arc quad 10 12    Seated march 10 12    Elbow flex 10 12    Overhead press 10 12                    AFTER TREATMENT PRECAUTIONS: Bed/Chair Locked, Call light within reach, Chair, Needs within reach, and RN notified    INTERDISCIPLINARY COLLABORATION:  RN/ PCT    EDUCATION:      TIME IN/OUT:  Time In: 1000  Time Out:  Deangelo Gross  Minutes: Parul  1560, PTA

## 2022-09-13 NOTE — PROGRESS NOTES
Bariatric Surgery Daily Progress Note    Patient: Huyen Cheema  MRN: [id number]  Date: 9/13/2022 7:31 AM  Admit Date: 9/12/2022  Procedure: laparoscopic sleeve gastrectomy    Subjective:     Huyen Cheema is a 61 y.o. male who is POD #1 s/p sleeve gastrectomy completed by Dr. Merry Coronado. Earnest Nicole. He reports that he is doing well today and feeling better. He admits to 5/10 generalized abdominal pain with deep inspiration. He denies nausea, vomiting or dry heaving. He has been OOB ambulating, voiding without difficulty, using the incentive spirometer and tolerating adequate PO intake including protein shakes.      Review of Systems   General ROS: negative for - fever or chills  Psychological ROS: negative for - memory difficulties  Ophthalmic ROS: negative for - blurry vision or double vision  Respiratory ROS: negative for - cough, shortness of breath, or wheezing  Cardiovascular ROS: negative for - chest pain  Gastrointestinal ROS: negative for - nausea, vomiting or dry heaving  Musculoskeletal ROS: negative for - pain in the lower extremities  Neurological ROS: negative for - dizziness, light headedness, headaches or numbness     Objective:     MEDS:    Current Facility-Administered Medications   Medication Dose Route Frequency Provider Last Rate Last Admin    acetaminophen (TYLENOL) tablet 1,000 mg  1,000 mg Oral Q6H MELE Machado   1,000 mg at 09/13/22 0539    diphenhydrAMINE (BENADRYL) capsule 25 mg  25 mg Oral Q6H PRN MELE Greene        Or    diphenhydrAMINE (BENADRYL) injection 25 mg  25 mg IntraVENous Q6H PRN MELE Greene        pantoprazole (PROTONIX) 40 mg in sodium chloride (PF) 10 mL injection  40 mg IntraVENous Daily MELE Machado        0.9% NaCl with KCl 20 mEq infusion   IntraVENous Continuous MELE Mishra 125 mL/hr at 09/12/22 2354 New Bag at 09/12/22 2354    sodium chloride flush 0.9 % injection 5-40 mL  5-40 mL IntraVENous 2 times per day MELE Vines   10 mL at 09/12/22 2214    sodium chloride flush 0.9 % injection 5-40 mL  5-40 mL IntraVENous PRN MELE Andino        0.9 % sodium chloride infusion   IntraVENous PRN MELE Vines        oxyCODONE (ROXICODONE) immediate release tablet 5 mg  5 mg Oral Q4H PRN MELE Vines   5 mg at 09/12/22 1527    ondansetron (ZOFRAN) injection 4 mg  4 mg IntraVENous Q6H PRN MELE Vines        prochlorperazine (COMPAZINE) injection 10 mg  10 mg IntraVENous Q6H PRN MELE Andino        hydrALAZINE (APRESOLINE) injection 10 mg  10 mg IntraVENous Q6H PRN MELE Andino        heparin (porcine) injection 5,000 Units  5,000 Units SubCUTAneous 3 times per day MELE Vines   5,000 Units at 09/13/22 0542    gabapentin (NEURONTIN) capsule 300 mg  300 mg Oral TID MELE Vines   300 mg at 09/12/22 2209    sucralfate (CARAFATE) tablet 1 g  1 g Oral 4 times per day MELE Vines   1 g at 09/13/22 0541    simethicone (MYLICON) chewable tablet 80 mg  80 mg Oral Q6H PRN MELE Machado        benzocaine (HURRICAINE) 20 % oral spray   Mouth/Throat Once Ebony Alarcon MD        DULoxetine (CYMBALTA) extended release capsule 60 mg  60 mg Oral Daily Mili Presley MD        fluticasone [facility]) 50 MCG/ACT nasal spray 2 spray  2 spray Nasal Daily PRN Mili Presley MD        mirtazapine (REMERON) tablet 30 mg  30 mg Oral Nightly Mili Presley MD   30 mg at 09/12/22 2210    tamsulosin (FLOMAX) capsule 0.4 mg  0.4 mg Oral Daily Mili Presley MD        hydrALAZINE (APRESOLINE) injection 10 mg  10 mg IntraVENous Q6H PRN Mili Presley MD        enalaprilat (VASOTEC) injection 1.25 mg  1.25 mg IntraVENous Q8H PRN Mili Presley MD           ALLERGIES:       Allergies   Allergen Reactions    Bee Venom Shortness Of Breath and Swelling     Carried epi pen    Wasp Venom Protein Anaphylaxis    Morphine Other (See Comments)     \"makes me mean\"  Other reaction(s): Altered Mental State-Intolerance       I/O:      Intake/Output Summary (Last 24 hours) at 9/13/2022 0731  Last data filed at 9/13/2022 6747  Gross per 24 hour   Intake 2400 ml   Output 1725 ml   Net 675 ml       Physical Examination:     /75   Pulse 55   Temp 97.3 °F (36.3 °C) (Oral)   Resp 19   Ht 6' (1.829 m)   Wt (!) 378 lb 6.4 oz (171.6 kg)   SpO2 95%   BMI 51.32 kg/m²     General:  Alert, oriented, cooperative in no acute distress. Neuro:  Alert, oriented to person, place and time. Lungs:  Unlabored breathing. Symmetrical chest expansion. Heart: Regular rate and rhythm. Abdomen:  Soft, appropriately tender at incision sites. Lap incisions C/D/I without presence of erythema, infection, or allergic reaction. Extremities:  Extremities normal, atraumatic, no cyanosis or edema. Labs / Imaging / Diagnostics:     Labs: All recent labs were reviewed.    Recent Results (from the past 24 hour(s))   Basic Metabolic Panel    Collection Time: 09/13/22  4:06 AM   Result Value Ref Range    Sodium 140 136 - 145 mmol/L    Potassium 4.2 3.5 - 5.1 mmol/L    Chloride 107 101 - 110 mmol/L    CO2 29 21 - 32 mmol/L    Anion Gap 4 4 - 13 mmol/L    Glucose 165 (H) 65 - 100 mg/dL    BUN 18 6 - 23 MG/DL    Creatinine 0.78 (L) 0.8 - 1.5 MG/DL    GFR African American >60 >60 ml/min/1.73m2    GFR Non- >60 >60 ml/min/1.73m2    Calcium 8.3 8.3 - 10.4 MG/DL   CBC with Auto Differential    Collection Time: 09/13/22  4:06 AM   Result Value Ref Range    WBC 8.4 4.3 - 11.1 K/uL    RBC 4.55 4.23 - 5.6 M/uL    Hemoglobin 12.0 (L) 13.6 - 17.2 g/dL    Hematocrit 37.6 (L) 41.1 - 50.3 %    MCV 82.6 79.6 - 97.8 FL    MCH 26.4 26.1 - 32.9 PG    MCHC 31.9 31.4 - 35.0 g/dL    RDW 14.8 (H) 11.9 - 14.6 %    Platelets 716 068 - 599 K/uL    MPV 10.1 9.4 - 12.3 FL    nRBC 0.00 0.0 - 0.2 K/uL    Differential Type AUTOMATED      Seg Neutrophils 81 (H) 43 - 78 %    Lymphocytes

## 2022-09-13 NOTE — DISCHARGE INSTRUCTIONS
DISCHARGE SUMMARY from Nurse    PATIENT INSTRUCTIONS:    After general anesthesia or intravenous sedation, for 24 hours or while taking prescription Narcotics:  Limit your activities  Do not drive and operate hazardous machinery  Do not make important personal or business decisions  Do  not drink alcoholic beverages  If you have not urinated within 8 hours after discharge, please contact your surgeon on call. Report the following to your surgeon:  Excessive pain, swelling, redness or odor of or around the surgical area  Temperature over 100.5  Nausea and vomiting lasting longer than 4 hours or if unable to take medications  Any signs of decreased circulation or nerve impairment to extremity: change in color, persistent  numbness, tingling, coldness or increase pain  Any questions    What to do at Home:    *  Please give a list of your current medications to your Primary Care Provider. *  Please update this list whenever your medications are discontinued, doses are      changed, or new medications (including over-the-counter products) are added. *  Please carry medication information at all times in case of emergency situations. These are general instructions for a healthy lifestyle:    No smoking/ No tobacco products/ Avoid exposure to second hand smoke  Surgeon General's Warning:  Quitting smoking now greatly reduces serious risk to your health. Obesity, smoking, and sedentary lifestyle greatly increases your risk for illness    A healthy diet, regular physical exercise & weight monitoring are important for maintaining a healthy lifestyle    You may be retaining fluid if you have a history of heart failure or if you experience any of the following symptoms:  Weight gain of 3 pounds or more overnight or 5 pounds in a week, increased swelling in our hands or feet or shortness of breath while lying flat in bed.   Please call your doctor as soon as you notice any of these symptoms; do not wait until your next office visit. The discharge information has been reviewed with the patient. The patient verbalized understanding. Discharge medications reviewed with the patient and appropriate educational materials and side effects teaching were provided. ___________________________________________________________________________________________________________________________________      Bariatric Surgery Discharge Instructions    Surgeon: Dr. Mariano. Patricio    Follow up: Follow up with your surgeon as previously scheduled in 1-2 weeks. Adonay Dobbins [facility]  Office number: [phone]    Diet:  When discharged from the hospital, you may begin clear and full liquid diet plus protein supplements  Start with clear liquids and progress to full liquids as tolerated  Goals: 60 grams of protein per day, 64 ounces of fluid per day  Avoid carbonated beverages and straws, avoid excessive air swallowing when drinking/eating, minimize caffeine intake. No alcohol. Wound care:  Surgical glue will flake off in 7-10 days; the edges of steri-strips may come up but leave them in place until your follow up appointment  May shower following surgery. It is okay to get soap and water on all wounds when showering. Do not soak wounds under water (bath, pool, hot tub) until healed about three weeks after surgery. Activity:  No lifting more than 20 lbs for 3-4 weeks. No repetitive abdominal straining (sit-ups, push-ups, crunches, pull-ups, squats), for 3 weeks. Okay to perform normal activities of daily living and walk up stairs. Walk daily. Continue deep breathing and use incentive spirometer at home. Return to school or work when you fee comfortable. Typically 1-2 weeks for a desk job or up to 4 weeks for a manual labor job. You may resume driving when you have minimal pain and are not taking narcotic pain medication. Medications:   You will have been prescribed the following medications prior to discharge:  Percocet (5mg): take one pill by mouth every 4 hours as needed for pain  Zofran (8mg): take one pill by mouth every 8 hours for nausea or vomiting  Omeprazole (40mg): take one pill by mouth daily for 90 days  Use an over the counter stool softener (Miralax) or laxative (Ducolax, milk of magnesium) if you feel constipated. You may not have a normal bowel movement being on a liquid diet. Do NOT take any NSAID medication (Ibuprofen, Aleve, Motrin, Naproxen, Celebrex, etc) after surgery  No nicotine in any form (cigarettes, vape, marijuana, chew, gum, patches)  It is acceptable to space multiple medications throughout the day as needed  Oral medications should be crushed if they are large; acceptable to take whole pills if they are small  You may resume home medications unless instructed otherwise    Notify your surgeon if. ..   Fever of 101.5 degrees F or 38 degrees C (by mouth)  Shortness of breath or difficulty breathing  Chest pain or very fast heart rate  Significant leg swelling and/or pain  Redness, swelling, foul-smelling drainage, bleeding or heat around your incisions  Persistent vomiting and/or inability to keep fluids down, lightheadedness  Significant abdominal bloating, swelling or pain

## 2022-09-13 NOTE — PROGRESS NOTES
Verbalized understanding for discharge instructions to include diet orders, prescriptions, follow up appointments, and surgical site instructions. Mother and spouse at bedside to hear instructions as well. IV accidentally removed by patient earlier in the morning. Has all personal belongings. D/c via wheelchair with family to drive home.

## 2022-09-13 NOTE — PROGRESS NOTES
Laci Valero, MS, RD, LD  Surgical Weight Loss Dietitian  [address] [place] [address], Parul Clifford, Katharina Montgomery  Phone [phone]   Fax [phone]    Nutrition Assessment:  Anthropometrics: Ht: 6'0\", Wt: 378#, 205 %IBW, 51.32 BMI  Co-morbidities: RACHEL, D, GERD, OA, Joint pain  Pt laying in bed, states feeling well this morning. They are tolerating liquids well and have consumed two entire protein shakes. Pt reports using IS and doing well with OOB movement. Nutrition Diagnosis:   Altered GI function R/T wt loss surgery as evidenced by s/p VSG. Morbid obesity R/T excessive energy intake as evidenced by 205 %IBW and 51.32 BMI. Intervention:   1. Pt visited by bariatric RD. Pt acknowledges the need for OOB movement. 2. RD answered pt questions and emphasized 1. Hydration 2. Protein 3. Movement. 3. Encouraged slow, small sips while upright. 4. Encouraged to begin MVI supplements. Reminded to take MVI and Calcium supplements separately. Monitoring and Evaluation:  1. Follow for tolerance of small amounts of clear, non-carbonated, no concentrated sweet clear liquids while admitted. Full Liquids when released to home. 2. Follow for weight loss per bariatric guidelines. 3. Bariatric RD to f/u as outpatient.      Laci Valero MS, RD, LD

## 2022-09-13 NOTE — PROGRESS NOTES
Shift exam complete. Patient A/O X4. Denies pain, states he hopes to go home today. Tolerating clear liquids, denies N/V. No BM but states he is passing flatus regularly. Voiding normally. 5 abdominal puncture sites are dry, intact without bleeding, drainage, or edema. Safety measures include SR X2, bed in l/l position, call light in lap.

## 2022-09-13 NOTE — CARE COORDINATION
09/13/22 0921   Service Assessment   Patient Orientation Alert and Oriented   Cognition Alert   History Provided By Patient   Primary Caregiver Self   Support Systems Spouse/Significant Other;Parent   PCP Verified by CM Yes   Prior Functional Level Independent in ADLs/IADLs   Current Functional Level Independent in ADLs/IADLs   Can patient return to prior living arrangement Yes   Social/Functional History   Lives With Spouse   Type of [address]   ADL Assistance Independent   Mode of Transportation Car   Discharge Planning   Type of Residence House   Living Arrangements Spouse/Significant Other   Current Services Prior To Admission C-pap   Potential Assistance Needed N/A   DME Ordered? No   Potential Assistance Purchasing Medications No   Type of Home Care Services None   [facility] [facility]   Services At/After Discharge None     RN FATOUMATA met with the patient at the bedside and discussed discharge planning. He lives with his spouse and reports he is independent of ADLs and does not use external services. He has a cane and a CPAP at home. His mother will transport him home at discharge. No case management needs were identified. RN FATOUMATA encouraged him to ask questions. Case Management will continue to follow him for discharge planning needs. He verbalized agreement with the discharge plan.

## 2022-09-13 NOTE — PLAN OF CARE
Problem: Chronic Conditions and Co-morbidities  Goal: Patient's chronic conditions and co-morbidity symptoms are monitored and maintained or improved  Outcome: Adequate for Discharge     Problem: Safety - Adult  Goal: Free from fall injury  Outcome: Adequate for Discharge     Problem: Discharge Planning  Goal: Discharge to home or other facility with appropriate resources  Outcome: Adequate for Discharge     Problem: Pain  Goal: Verbalizes/displays adequate comfort level or baseline comfort level  Outcome: Adequate for Discharge

## 2022-09-14 ENCOUNTER — CARE COORDINATION (OUTPATIENT)
Dept: CARE COORDINATION | Facility: CLINIC | Age: 57
End: 2022-09-14

## 2022-09-14 NOTE — PROGRESS NOTES
Ni La MS, RD, LD  Surgical Weight Loss Dietitian  [address] [facility]  Katharina Ordonez  Phone [phone]   Fax [phone]    [facility] NUTRITION REASSESSMENT  Anthropometrics:    Ht: 60, wt: 360#, 196% IBW, 48.82 BMI  Pt has lost 15 lbs since last visit. Assessment:  Pt is 1 weeks s/p VSG. Pt is doing well with full liquid diet compliance. Intake of ~60+g protein/d and ~64+oz fluid/d. Pt is drinking crystal light tea, protein shakes, water, soups, greek yogurt, sf pudding, mashed potatoes. Pt is exercising via walking. Pt is taking MVI and Ca supplements as recommended. Nutrition Diagnosis:   Altered GI function R/T wt loss surgery as evidenced by s/p VSG. Class III obesity R/T excessive energy intake as evidenced by BMI = 48.82 and 196 % IBW.  --ongoing, modified--BMI and %IBW adjusted to reflect weight loss--     Intervention:   Discussed food choices and meal ideas on pureed/smooth diet, once released by surgeon. Encouraged pt to follow mindful eating behaviors, lean protein focus followed by non-starchy vegetables as able. Encouraged pt use protein supplements throughout the day as snacks rather than as meal replacement. Encouraged continued and increased activity. Monitoring and Evaluation:  Monitor for continued safe, supervised weight loss for VSG. Monitor pt for meeting protein requirements of 60-80g/d, 64+ fl oz fluids. Follow for tolerance of pureed/smooth diet. Follow for increased activity.   F/U per MD Ni La MS, RD, LD

## 2022-09-14 NOTE — CARE COORDINATION
[facility] [facility] Transitions Initial Follow Up Call    Call within 2 business days of discharge: Yes    Patient: Radha Friend Patient : 1965   MRN: [id number]  Reason for Admission: elective surgery- ERAS/ GASTRECTOMY SLEEVE LAPAROSCOPIC   Discharge Date: 22 RARS: Readmission Risk Score: 7.6      Last Discharge [facility]       Date Complaint Diagnosis Description Type Department Provider    22   Admission (Discharged) Kimber Monsivais MD             Transitions of Care Initial Call    Was this an external facility discharge? No Discharge Facility: [facility]    Challenges to be reviewed by the provider   Additional needs identified to be addressed with provider: No  none             Method of communication with provider : none    Advance Care Planning:   Does patient have an Advance Directive:  decision maker verified . LPN Care Coordinator contacted the patient by telephone to perform post hospital discharge assessment. Verified name and  with patient as identifiers. Provided introduction to self, and explanation of the LPN CC role. LPN CC reviewed discharge instructions, medical action plan and red flags with patient who verbalized understanding. Patient given an opportunity to ask questions and does not have any further questions or concerns at this time. Were discharge instructions available to patient? Yes. Reviewed appropriate site of care based on symptoms and resources available to patient including: PCP  Specialist. The patient agrees to contact the PCP office for questions related to their healthcare. Medication review of discharge medication instruction was performed with patient, who verbalizes understanding of administration of home medications. Was patient discharged with a pulse oximeter? no    LPN CC provided contact information. No further follow-up call indicated based on severity of symptoms and risk factors.   Plan for next call:  graduated from [facility] program, patient will reach out should need arise.        Non-face-to-face services provided:  Obtained and reviewed discharge summary and/or continuity of care documents    Care Transitions 24 Hour Call    Schedule Follow Up Appointment with PCP: Completed  Do you have a copy of your discharge instructions?: Yes  Do you have all of your prescriptions and are they filled?: Yes  Have you been contacted by a [address]?: No  Have you scheduled your follow up appointment?: Yes  How are you going to get to your appointment?: Car - drive self  Do you have support at home?: Partner/Spouse/SO  Do you feel like you have everything you need to keep you well at home?: Yes  Care Transitions Interventions  No Identified Needs         Follow Up  Future Appointments   Date Time Provider Chaparrita Phillip   9/19/2022  9:30 AM MELE Cerda CSAE [facility] AMB   11/7/2022  9:15 AM LEVON MISCELLANEOUS FPTHIEN [facility] AMB   11/14/2022  9:15 AM Ovi Amin MD FPA [facility] AMB       Peggy Barnes LPN

## 2022-09-16 NOTE — PROGRESS NOTES
Lucas Mansfield PA-C  Bariatric & Advanced Laparoscopic Surgery & Endoscopy  [address] [facility] [address], [address] [facility], [place] [address]  Phone [phone]   Fax [phone]    Date of visit: 2022          Name: Nidhi Chavez      MRN: [id number]       : 1965       Age: 61 y.o. Sex: male        PCP: Adalid Graham MD     Surgeon: Dr. Tessy Masters. Patricio  Procedure: laparoscopic sleeve gastrectomy    HPI:    1 week post-op visit after a laparoscopic sleeve gastrectomy was done on 2022. He has lost 15 lbs since his last office visit. Weight History Graph  Post-Surgical Weight Loss  Date: 22  Height: 6' (182.9 cm)  Weight: 360 lb (163.3 kg)  BMI: 48.82  Weight Change: -15 lbs  Total Weight Change: -15 lbs  % EBWL: 8%    Evaluation of Pre-operative Co-morbid Conditions:    Sleep Apnea Yes, uses CPAP- Yes   Diabetes Yes, insulin dependent- No   GERD Yes, treatment medication- Omeprazole     Clinical Assessment and Physical Exam:    He is doing very well today and is feeling good. He reports that he has been adhering to the liquid diet without difficulty. He admits to improving abdominal pain at the largest incision site. He denies any nausea or vomiting or heartburn. He denies fevers or chills, or any redness or drainage from the incision sites. He does not report having problems with constipation, but has been experiencing diarrhea. His pain is well controlled and is not taking pain medications. He has been taking the PPI without difficulty. He has been doing his protein shakes without difficulty. He report that his back and hip pain has been bothersome, and has started feeling better overall as of yesterday. He also reports slightly advancing his diet and has been eating eggs. Protein:  60+ grams per day  Fluids:    64+ ounces per day  Exercise:  walking some  Denies reflux. Denies dysphagia. Tolerating Protein first diet without difficulty.     PMH:  Past Medical History:   Diagnosis Date    Arthritis     osteo - in back, hips    Benign prostatic hyperplasia     Chronic pain     hips, neck, back    Diabetes mellitus ([facility] [address]     Controlled type 2 diabetes mellitus without complication, without long-term current use of insulin;  no bs checks at home; no meds; A1c=6.3 on 7/7/22    Fatty liver     Former smoker     GERD (gastroesophageal reflux disease)     on med for control    History of staph infection 1996    appendix ruptured    Hypertension     on med for control    Ill-defined disease     swelling of both lower legs- SCIATICA- legs feel like \"burning\"    Impaired mobility     walks with cane    Insomnia     Morbid obesity (HCC)     BMI=51.81    Other ill-defined conditions(799.89)     stuffy nose/ infection    Other ill-defined conditions(799.89)     spinal stenosis    Other ill-defined conditions(799.89)     several kidney stones-passed on own    Peptic ulcer 10/11/2013    Peripheral neuropathy     hands and feet    Unspecified sleep apnea     c-pap nightly; instructed to bring dos       PSH:  Past Surgical History:   Procedure Laterality Date    APPENDECTOMY      COLONOSCOPY      ENDOSCOPY, COLON, DIAGNOSTIC      EYE SURGERY Left     SINUS SURGERY      sinus surgery; nasal polyps removed - Dr. Farida Funes N/A 9/12/2022    ERAS/ GASTRECTOMY SLEEVE LAPAROSCOPIC - DIFFICULT AIRWAY SUSPECTED performed by Cindi Childs MD at [address] [facility] [address] ENDOSCOPY N/A 08/05/2022    EGD BIOPSY performed by Cindi Childs MD at [place] [address] Bilateral        MEDS:  Current Outpatient Medications   Medication Sig Dispense Refill    ondansetron (ZOFRAN) 4 MG tablet Take 1 tablet by mouth 3 times daily as needed for Nausea or Vomiting 30 tablet 0    triamcinolone (KENALOG) 0.1 % lotion Apply topically 3 times daily Apply topically 3 times daily.  1 each 1    omeprazole (PRILOSEC) 40 MG delayed release capsule Take 1 capsule by mouth in the morning and 1 capsule before bedtime. 90 capsule 3    hydroCHLOROthiazide (HYDRODIURIL) 25 MG tablet Take 1 tablet by mouth in the morning. 30 tablet 11    DULoxetine (CYMBALTA) 60 MG extended release capsule 2 po qd 180 capsule 3    hydrocortisone (ANUSOL-HC) 25 MG suppository Place 1 suppository rectally in the morning and 1 suppository in the evening. 20 suppository 11    Ascorbic Acid (VITAMIN C CR) 1000 MG TBCR Take by mouth      Diclofenac Sodium 1.5 % SOLN 40 drops to a knee 4 times a day      fluticasone (FLONASE) 50 MCG/ACT nasal spray 2 sprays by Nasal route as needed      Glucosamine 500 MG CAPS Take 500 mg by mouth in the morning and at bedtime      Magnesium 500 MG CAPS Take 500 mg by mouth daily      mirtazapine (REMERON) 30 MG tablet Take 30 mg by mouth nightly      tamsulosin (FLOMAX) 0.4 MG capsule TAKE 1 CAPSULE NIGHTLY       No current facility-administered medications for this visit. ALLERGIES:    Allergies   Allergen Reactions    Bee Venom Shortness Of Breath and Swelling     Carried epi pen    Wasp Venom Protein Anaphylaxis    Morphine Other (See Comments)     \"makes me mean\"  Other reaction(s):  Altered Mental State-Intolerance       SH:  Social History     Tobacco Use    Smoking status: Former     Packs/day: 1.00     Years: 25.00     Pack years: 25.00     Types: Cigarettes     Start date:      Quit date: 10/11/2004     Years since quittin.9    Smokeless tobacco: Never   Vaping Use    Vaping Use: Never used   Substance Use Topics    Alcohol use: Yes     Comment: drink every two months or so    Drug use: No       FH:  Family History   Problem Relation Age of Onset    Cancer Paternal Uncle         leukemia    Hypertension Maternal Grandfather     Cancer Father         Lung cancer - smoker    Glaucoma Maternal Grandmother     Cancer Paternal Grandmother         lung cancer - smoker       Review of systems:  The patient has no family or caregiver, and/or coordinating care.

## 2022-09-19 ENCOUNTER — OFFICE VISIT (OUTPATIENT)
Dept: SURGERY | Age: 57
End: 2022-09-19

## 2022-09-19 VITALS
HEART RATE: 117 BPM | HEIGHT: 72 IN | SYSTOLIC BLOOD PRESSURE: 140 MMHG | DIASTOLIC BLOOD PRESSURE: 78 MMHG | BODY MASS INDEX: 42.66 KG/M2 | WEIGHT: 315 LBS

## 2022-09-19 DIAGNOSIS — Z71.82 EXERCISE COUNSELING: ICD-10-CM

## 2022-09-19 DIAGNOSIS — E66.01 OBESITY, CLASS III, BMI 40-49.9 (MORBID OBESITY) (HCC): ICD-10-CM

## 2022-09-19 DIAGNOSIS — Z98.84 S/P LAPAROSCOPIC SLEEVE GASTRECTOMY: ICD-10-CM

## 2022-09-19 DIAGNOSIS — E66.01 MORBID OBESITY (HCC): Primary | ICD-10-CM

## 2022-09-19 DIAGNOSIS — Z71.3 DIETARY COUNSELING: ICD-10-CM

## 2022-09-19 PROCEDURE — 99024 POSTOP FOLLOW-UP VISIT: CPT | Performed by: PHYSICIAN ASSISTANT

## 2022-09-29 NOTE — PROGRESS NOTES
Pura Johnson PA-C  Bariatric & Advanced Laparoscopic Surgery & Endoscopy  [address] [place] [address], [address] [facility]  Katharina Ordonez  Phone [phone]   Fax [phone]    Date of visit: 10/5/2022          Name: Ramiro Armstrong      MRN: [id number]       : 1965       Age: 61 y.o. Sex: male        PCP: Vallorie Frankel, MD     Surgeon: Dr. Deneen Hernandez. Patricio  Procedure: laparoscopic sleeve gastrectomy    HPI:    3 weeks post-op visit after a laparoscopic sleeve gastrectomy was done on 2022. He has lost 5lbs since his last office visit. Weight History Graph  Post-Surgical Weight Loss  Date: 10/05/22  Height: 6' (182.9 cm)  Weight: 355 lb (161 kg)  BMI: 48.14  Weight Change: -5 lbs  Total Weight Change: -20 lbs  % EBWL: 10%    Evaluation of Pre-operative Co-morbid Conditions:    Sleep Apnea Yes, uses CPAP- Yes   Diabetes Yes, insulin dependent- No   GERD Yes, treatment medication- Omeprazole      Clinical Assessment and Physical Exam:    He is doing very well today and is feeling good. He reports that he has not been adhering to the pureed / smooth diet, and has tried such foods as sausage, pasta and grilled cheese sandwiches. He also admits to abdominal pain from eating inappropriate foods. He denies any nausea, vomiting or heartburn. He denies fevers or chills, or any redness or drainage from the incision sites. He does report having problems with constipation, which he reports is from the iron supplementation. His pain is well controlled and is not taking pain medications. He has been taking the PPI without difficulty. He has been doing his protein shakes without difficulty, as this is how he is meeting his protein goals. Protein:  120 grams per day  Fluids:    64 ounces per day  Exercise:  none  Denies reflux. Denies dysphagia. Tolerating Protein first diet without difficulty.     PMH:  Past Medical History:   Diagnosis Date    Arthritis     osteo - in back, hips Benign prostatic hyperplasia     Chronic pain     hips, neck, back    Diabetes mellitus ([facility] [address]     Controlled type 2 diabetes mellitus without complication, without long-term current use of insulin;  no bs checks at home; no meds; A1c=6.3 on 7/7/22    Fatty liver     Former smoker     GERD (gastroesophageal reflux disease)     on med for control    History of staph infection 1996    appendix ruptured    Hypertension     on med for control    Ill-defined disease     swelling of both lower legs- SCIATICA- legs feel like \"burning\"    Impaired mobility     walks with cane    Insomnia     Morbid obesity (HCC)     BMI=51.81    Other ill-defined conditions(799.89)     stuffy nose/ infection    Other ill-defined conditions(799.89)     spinal stenosis    Other ill-defined conditions(799.89)     several kidney stones-passed on own    Peptic ulcer 10/11/2013    Peripheral neuropathy     hands and feet    Unspecified sleep apnea     c-pap nightly; instructed to bring dos       PSH:  Past Surgical History:   Procedure Laterality Date    APPENDECTOMY      COLONOSCOPY      ENDOSCOPY, COLON, DIAGNOSTIC      EYE SURGERY Left     SINUS SURGERY      sinus surgery; nasal polyps removed - Dr. Travis Draft N/A 9/12/2022    ERAS/ GASTRECTOMY SLEEVE LAPAROSCOPIC - DIFFICULT AIRWAY SUSPECTED performed by Tim Lees MD at [address] [facility] [address] ENDOSCOPY N/A 08/05/2022    EGD BIOPSY performed by Tim Lees MD at [place] [address] Bilateral        MEDS:  Current Outpatient Medications   Medication Sig Dispense Refill    ondansetron (ZOFRAN) 4 MG tablet Take 1 tablet by mouth 3 times daily as needed for Nausea or Vomiting 30 tablet 0    triamcinolone (KENALOG) 0.1 % lotion Apply topically 3 times daily Apply topically 3 times daily.  1 each 1    omeprazole (PRILOSEC) 40 MG delayed release capsule Take 1 capsule by mouth in the morning and 1 capsule before bedtime. 90 capsule 3    hydroCHLOROthiazide (HYDRODIURIL) 25 MG tablet Take 1 tablet by mouth in the morning. 30 tablet 11    DULoxetine (CYMBALTA) 60 MG extended release capsule 2 po qd 180 capsule 3    hydrocortisone (ANUSOL-HC) 25 MG suppository Place 1 suppository rectally in the morning and 1 suppository in the evening. 20 suppository 11    Ascorbic Acid (VITAMIN C CR) 1000 MG TBCR Take by mouth      Diclofenac Sodium 1.5 % SOLN 40 drops to a knee 4 times a day      fluticasone (FLONASE) 50 MCG/ACT nasal spray 2 sprays by Nasal route as needed      Glucosamine 500 MG CAPS Take 500 mg by mouth in the morning and at bedtime      Magnesium 500 MG CAPS Take 500 mg by mouth daily      mirtazapine (REMERON) 30 MG tablet Take 30 mg by mouth nightly      tamsulosin (FLOMAX) 0.4 MG capsule TAKE 1 CAPSULE NIGHTLY       No current facility-administered medications for this visit. ALLERGIES:    Allergies   Allergen Reactions    Bee Venom Shortness Of Breath and Swelling     Carried epi pen    Wasp Venom Protein Anaphylaxis    Morphine Other (See Comments)     \"makes me mean\"  Other reaction(s): Altered Mental State-Intolerance       SH:  Social History     Tobacco Use    Smoking status: Former     Packs/day: 1.00     Years: 25.00     Pack years: 25.00     Types: Cigarettes     Start date:      Quit date: 10/11/2004     Years since quittin.9    Smokeless tobacco: Never   Vaping Use    Vaping Use: Never used   Substance Use Topics    Alcohol use: Yes     Comment: drink every two months or so    Drug use: No       FH:  Family History   Problem Relation Age of Onset    Cancer Paternal Uncle         leukemia    Hypertension Maternal Grandfather     Cancer Father         Lung cancer - smoker    Glaucoma Maternal Grandmother     Cancer Paternal Grandmother         lung cancer - smoker       Review of systems:  The patient has no difficulty with chest pain or shortness of breath. No fever or chills. Comprehensive 13 point review of systems was otherwise unremarkable except as noted above. Physical Exam:     /74   Pulse (!) 112   Ht 6' (1.829 m)   Wt (!) 355 lb (161 kg)   BMI 48.15 kg/m²     General:  Well-developed, well-nourished, no distress. Psych:  Cooperative, good insight and judgement. Neuro:  Alert, oriented to person, place and time. Lungs:  Unlabored breathing. Symmetrical chest expansion. Heart:  Regular rate and rhythm. Abdomen:  Soft, non-tender, non-distended. No guarding or rebound. Lap incisions are healing well. Labs: All labs reviewed today. Imaging: All imaging reviewed today. Diagnosis Orders   1. Morbid obesity ([address]        2. Obesity, Class III, BMI 40-49.9 (morbid obesity) ([address]        3. S/P laparoscopic sleeve gastrectomy        4. Dietary counseling        5. Exercise counseling            Assessment/Plan:    Samara Mcbride is a 61 y.o. male here to follow up s/p laparoscopic sleeve gastrectomy    He is doing well with some concerns. He is not adhering to the diet and we discussed and addressed all his questions. He will remain on a pureed diet until 10/10/2022 , and can advance to a soft diet at this time. I encouraged him to increase physical activity and aim for 300 minutes a week and include 2 strength session a week to maintain the weight loss. He will continue recommended vitamin/mineral supplementation and PPI until the prescription is complete. Return to the office in 3 weeks with myself.     Braulio Trujillo PA-C  10/5/2022    Counseling time:counseling time more than 50% of visit: 20 minutes: I spent this time preparing to see patient (including chart review and preparation), obtaining and/or reviewing additional medical history, performing a physical exam and evaluation, documenting clinical information in the electronic health record, independently interpreting results, communicating results to patient, family or caregiver, and/or coordinating care.

## 2022-10-04 NOTE — PROGRESS NOTES
Kenny Andrade MS, RD, LD  Surgical Weight Loss Dietitian  [address] [place] [address]  Katharina Ordonez  Phone [phone]   Fax [phone]    [facility] NUTRITION REASSESSMENT  Anthropometrics:    Ht: 60, wt: 355#, 193% IBW, 48.15 BMI  Pt has lost 5 lbs since last visit. Assessment:  Pt is 3 weeks s/p VSG. Pt is not doing well with pureed/smooth diet compliance. Intake of ~120g protein/d and ~64+oz fluid/d. Pt is waiting 15 minutes after eating to drink liquids. Pt is eating and drinking pasta, grilled cheese sandwich, chicken, sausage, water, coffee regular, tea regular, protein shakes. Pt is not exercising. Pt is not taking MVI and Ca supplements as recommended. Pt reports having difficulty with holding foods down, and doing mostly liquid intake due to nausea. Nutrition Diagnosis:   Altered GI function R/T wt loss surgery as evidenced by s/p VSG. Class III obesity R/T excessive energy intake as evidenced by BMI = 48.15 and 193 % IBW.  --ongoing, modified--BMI and %IBW adjusted to reflect weight loss--     Intervention:   Discussed food choices and meal ideas on Soft diet, once released by surgeon. Encouraged pt to follow mindful eating behaviors, lean protein focus followed by non-starchy vegetables as able. Encouraged pt use protein supplements throughout the day as snacks rather than as meal replacement. Encouraged continued and increased activity. Pt goal of resistance bands exercise for 30 mins 3x week. Monitoring and Evaluation:  Monitor for continued safe, supervised weight loss for VSG. Monitor pt for meeting requirements of 60-80g/d protein, 64+ fl oz fluids. Follow for tolerance of Soft diet. Follow for increased activity.   F/U per MD Kenny Andrade, MS, RD, LD

## 2022-10-05 ENCOUNTER — OFFICE VISIT (OUTPATIENT)
Dept: SURGERY | Age: 57
End: 2022-10-05

## 2022-10-05 VITALS
DIASTOLIC BLOOD PRESSURE: 74 MMHG | WEIGHT: 315 LBS | HEIGHT: 72 IN | SYSTOLIC BLOOD PRESSURE: 109 MMHG | BODY MASS INDEX: 42.66 KG/M2 | HEART RATE: 112 BPM

## 2022-10-05 DIAGNOSIS — Z71.82 EXERCISE COUNSELING: ICD-10-CM

## 2022-10-05 DIAGNOSIS — Z71.3 DIETARY COUNSELING: ICD-10-CM

## 2022-10-05 DIAGNOSIS — E66.01 MORBID OBESITY (HCC): Primary | ICD-10-CM

## 2022-10-05 DIAGNOSIS — Z98.84 S/P LAPAROSCOPIC SLEEVE GASTRECTOMY: ICD-10-CM

## 2022-10-05 DIAGNOSIS — E66.01 OBESITY, CLASS III, BMI 40-49.9 (MORBID OBESITY) (HCC): ICD-10-CM

## 2022-10-05 PROCEDURE — 99024 POSTOP FOLLOW-UP VISIT: CPT | Performed by: PHYSICIAN ASSISTANT

## 2022-10-05 RX ORDER — TEMAZEPAM 30 MG/1
CAPSULE ORAL
COMMUNITY
Start: 2022-09-24

## 2022-10-07 NOTE — INTERVAL H&P NOTE
Update History & Physical    The patient's History and Physical of 9/9/22 was reviewed with the patient and I examined the patient. There was no change. The surgical site was confirmed by the patient and me. Plan: The risks, benefits, expected outcome, and alternative to the recommended procedure have been discussed with the patient. Patient understands and wants to proceed with the procedure.      Electronically signed by Alexa Valverde MD on 9/12/2022 at 6:29 AM Yes

## 2022-10-28 ENCOUNTER — TELEPHONE (OUTPATIENT)
Dept: FAMILY MEDICINE CLINIC | Facility: CLINIC | Age: 57
End: 2022-10-28

## 2022-10-28 DIAGNOSIS — E11.40 TYPE 2 DIABETES MELLITUS WITH DIABETIC NEUROPATHY, WITHOUT LONG-TERM CURRENT USE OF INSULIN (HCC): Primary | ICD-10-CM

## 2022-11-07 ENCOUNTER — NURSE ONLY (OUTPATIENT)
Dept: FAMILY MEDICINE CLINIC | Facility: CLINIC | Age: 57
End: 2022-11-07

## 2022-11-07 DIAGNOSIS — E11.40 TYPE 2 DIABETES MELLITUS WITH DIABETIC NEUROPATHY, WITHOUT LONG-TERM CURRENT USE OF INSULIN (HCC): ICD-10-CM

## 2022-11-07 LAB
ALBUMIN SERPL-MCNC: 3.6 G/DL (ref 3.5–5)
ALBUMIN/GLOB SERPL: 1.2 {RATIO} (ref 0.4–1.6)
ALP SERPL-CCNC: 102 U/L (ref 50–136)
ALT SERPL-CCNC: 38 U/L (ref 12–65)
ANION GAP SERPL CALC-SCNC: 5 MMOL/L (ref 2–11)
AST SERPL-CCNC: 21 U/L (ref 15–37)
BASOPHILS # BLD: 0 K/UL (ref 0–0.2)
BASOPHILS NFR BLD: 0 % (ref 0–2)
BILIRUB SERPL-MCNC: 0.5 MG/DL (ref 0.2–1.1)
BUN SERPL-MCNC: 21 MG/DL (ref 6–23)
CALCIUM SERPL-MCNC: 9.3 MG/DL (ref 8.3–10.4)
CHLORIDE SERPL-SCNC: 107 MMOL/L (ref 101–110)
CO2 SERPL-SCNC: 27 MMOL/L (ref 21–32)
CREAT SERPL-MCNC: 0.9 MG/DL (ref 0.8–1.5)
DIFFERENTIAL METHOD BLD: ABNORMAL
EOSINOPHIL # BLD: 0.2 K/UL (ref 0–0.8)
EOSINOPHIL NFR BLD: 3 % (ref 0.5–7.8)
ERYTHROCYTE [DISTWIDTH] IN BLOOD BY AUTOMATED COUNT: 14.6 % (ref 11.9–14.6)
EST. AVERAGE GLUCOSE BLD GHB EST-MCNC: 166 MG/DL
GLOBULIN SER CALC-MCNC: 3.1 G/DL (ref 2.8–4.5)
GLUCOSE SERPL-MCNC: 167 MG/DL (ref 65–100)
HBA1C MFR BLD: 7.4 % (ref 4.8–5.6)
HCT VFR BLD AUTO: 42.2 % (ref 41.1–50.3)
HGB BLD-MCNC: 12.9 G/DL (ref 13.6–17.2)
IMM GRANULOCYTES # BLD AUTO: 0 K/UL (ref 0–0.5)
IMM GRANULOCYTES NFR BLD AUTO: 1 % (ref 0–5)
LYMPHOCYTES # BLD: 1.7 K/UL (ref 0.5–4.6)
LYMPHOCYTES NFR BLD: 27 % (ref 13–44)
MCH RBC QN AUTO: 26.7 PG (ref 26.1–32.9)
MCHC RBC AUTO-ENTMCNC: 30.6 G/DL (ref 31.4–35)
MCV RBC AUTO: 87.2 FL (ref 82–102)
MONOCYTES # BLD: 0.3 K/UL (ref 0.1–1.3)
MONOCYTES NFR BLD: 5 % (ref 4–12)
NEUTS SEG # BLD: 4.1 K/UL (ref 1.7–8.2)
NEUTS SEG NFR BLD: 65 % (ref 43–78)
NRBC # BLD: 0 K/UL (ref 0–0.2)
PLATELET # BLD AUTO: 214 K/UL (ref 150–450)
PMV BLD AUTO: 10.4 FL (ref 9.4–12.3)
POTASSIUM SERPL-SCNC: 4.2 MMOL/L (ref 3.5–5.1)
PROT SERPL-MCNC: 6.7 G/DL (ref 6.3–8.2)
RBC # BLD AUTO: 4.84 M/UL (ref 4.23–5.6)
SODIUM SERPL-SCNC: 139 MMOL/L (ref 133–143)
WBC # BLD AUTO: 6.3 K/UL (ref 4.3–11.1)

## 2022-11-14 ENCOUNTER — OFFICE VISIT (OUTPATIENT)
Dept: FAMILY MEDICINE CLINIC | Facility: CLINIC | Age: 57
End: 2022-11-14
Payer: COMMERCIAL

## 2022-11-14 VITALS
OXYGEN SATURATION: 98 % | HEIGHT: 72 IN | SYSTOLIC BLOOD PRESSURE: 149 MMHG | WEIGHT: 315 LBS | RESPIRATION RATE: 16 BRPM | HEART RATE: 96 BPM | BODY MASS INDEX: 42.66 KG/M2 | DIASTOLIC BLOOD PRESSURE: 78 MMHG | TEMPERATURE: 97.2 F

## 2022-11-14 DIAGNOSIS — F51.01 PRIMARY INSOMNIA: ICD-10-CM

## 2022-11-14 DIAGNOSIS — E66.01 CLASS 3 SEVERE OBESITY DUE TO EXCESS CALORIES WITH SERIOUS COMORBIDITY AND BODY MASS INDEX (BMI) OF 45.0 TO 49.9 IN ADULT (HCC): ICD-10-CM

## 2022-11-14 DIAGNOSIS — G25.81 RESTLESS LEG: ICD-10-CM

## 2022-11-14 DIAGNOSIS — M16.10 HIP ARTHRITIS: ICD-10-CM

## 2022-11-14 DIAGNOSIS — M48.00 SPINAL STENOSIS, UNSPECIFIED SPINAL REGION: ICD-10-CM

## 2022-11-14 DIAGNOSIS — E11.9 CONTROLLED TYPE 2 DIABETES MELLITUS WITHOUT COMPLICATION, WITHOUT LONG-TERM CURRENT USE OF INSULIN (HCC): Primary | ICD-10-CM

## 2022-11-14 DIAGNOSIS — M51.36 DDD (DEGENERATIVE DISC DISEASE), LUMBAR: ICD-10-CM

## 2022-11-14 DIAGNOSIS — G57.93 NEUROPATHY OF BOTH FEET: ICD-10-CM

## 2022-11-14 DIAGNOSIS — E11.40 TYPE 2 DIABETES MELLITUS WITH DIABETIC NEUROPATHY, WITHOUT LONG-TERM CURRENT USE OF INSULIN (HCC): ICD-10-CM

## 2022-11-14 PROBLEM — E66.813 CLASS 3 SEVERE OBESITY DUE TO EXCESS CALORIES WITH SERIOUS COMORBIDITY AND BODY MASS INDEX (BMI) OF 45.0 TO 49.9 IN ADULT: Status: ACTIVE | Noted: 2022-09-12

## 2022-11-14 PROCEDURE — 3051F HG A1C>EQUAL 7.0%<8.0%: CPT | Performed by: FAMILY MEDICINE

## 2022-11-14 PROCEDURE — 99214 OFFICE O/P EST MOD 30 MIN: CPT | Performed by: FAMILY MEDICINE

## 2022-11-14 RX ORDER — TRAZODONE HYDROCHLORIDE 50 MG/1
50 TABLET ORAL NIGHTLY PRN
Qty: 30 TABLET | Refills: 5 | Status: SHIPPED | OUTPATIENT
Start: 2022-11-14

## 2022-11-14 ASSESSMENT — PATIENT HEALTH QUESTIONNAIRE - PHQ9
2. FEELING DOWN, DEPRESSED OR HOPELESS: 3
SUM OF ALL RESPONSES TO PHQ QUESTIONS 1-9: 6
1. LITTLE INTEREST OR PLEASURE IN DOING THINGS: 0
6. FEELING BAD ABOUT YOURSELF - OR THAT YOU ARE A FAILURE OR HAVE LET YOURSELF OR YOUR FAMILY DOWN: 0
SUM OF ALL RESPONSES TO PHQ QUESTIONS 1-9: 6
SUM OF ALL RESPONSES TO PHQ9 QUESTIONS 1 & 2: 0
5. POOR APPETITE OR OVEREATING: 0
SUM OF ALL RESPONSES TO PHQ9 QUESTIONS 1 & 2: 3
8. MOVING OR SPEAKING SO SLOWLY THAT OTHER PEOPLE COULD HAVE NOTICED. OR THE OPPOSITE, BEING SO FIGETY OR RESTLESS THAT YOU HAVE BEEN MOVING AROUND A LOT MORE THAN USUAL: 0
9. THOUGHTS THAT YOU WOULD BE BETTER OFF DEAD, OR OF HURTING YOURSELF: 0
SUM OF ALL RESPONSES TO PHQ QUESTIONS 1-9: 0
1. LITTLE INTEREST OR PLEASURE IN DOING THINGS: 0
SUM OF ALL RESPONSES TO PHQ QUESTIONS 1-9: 0
SUM OF ALL RESPONSES TO PHQ QUESTIONS 1-9: 0
SUM OF ALL RESPONSES TO PHQ QUESTIONS 1-9: 6
3. TROUBLE FALLING OR STAYING ASLEEP: 1
2. FEELING DOWN, DEPRESSED OR HOPELESS: 0
7. TROUBLE CONCENTRATING ON THINGS, SUCH AS READING THE NEWSPAPER OR WATCHING TELEVISION: 0
SUM OF ALL RESPONSES TO PHQ QUESTIONS 1-9: 0
SUM OF ALL RESPONSES TO PHQ QUESTIONS 1-9: 6
4. FEELING TIRED OR HAVING LITTLE ENERGY: 2

## 2022-11-14 NOTE — PROGRESS NOTES
[address] [facility] [address]  Phone: [phone] Fax [phone]  Fahad Hughes MD  11/14/2022         Mr. Trudy Miller  is a 61y.o.  year old  male patient who comes in to check on diabetes, hypertension, and high cholesterol. Checks blood sugars not at all. Sugars have been running unknown. Last eye exam was last year. Feet have neuropathy problems. Did  have a flu shot this year. Did have a pneumonia shot unknown. Did have a tetanus shot unknown. Has  been working on diet but cannot exercise due to his hips needing replacement. Blood pressure has been up some. Lower extremity swelling is better on hydrochlorothiazide. He did have gastric bypass to help him to lose weight so that he can have his hips replaced. He is still not able to eat fully regular diet. Still does have a lot of trouble sleeping. He is not depressed he just has trouble sleeping. PHQ-9  11/14/2022   Little interest or pleasure in doing things 0   Little interest or pleasure in doing things -   Feeling down, depressed, or hopeless 3   Trouble falling or staying asleep, or sleeping too much 1   Feeling tired or having little energy 2   Poor appetite or overeating 0   Feeling bad about yourself - or that you are a failure or have let yourself or your family down 0   Trouble concentrating on things, such as reading the newspaper or watching television 0   Moving or speaking so slowly that other people could have noticed. Or the opposite - being so fidgety or restless that you have been moving around a lot more than usual 0   Thoughts that you would be better off dead, or of hurting yourself in some way 0   PHQ-2 Score 3   Total Score PHQ 2 -   PHQ-9 Total Score 6   If you checked off any problems, how difficult have these problems made it for you to do your work, take care of things at home, or get along with other people? -         Mr. Trudy Miller  has  has a past medical history of Arthritis, Benign prostatic hyperplasia, Chronic pain, Diabetes mellitus ([address] Fatty liver, Former smoker, GERD (gastroesophageal reflux disease), History of staph infection, Hypertension, Ill-defined disease, Impaired mobility, Insomnia, Morbid obesity ([address] Other ill-defined conditions(799.89), Other ill-defined conditions(799.89), Other ill-defined conditions(799.89), Peptic ulcer, Peripheral neuropathy, and Unspecified sleep apnea. Mr. Dylan Horner  has  has a past surgical history that includes Appendectomy; sinus surgery; Tonsillectomy and adenoidectomy; Endoscopy, colon, diagnostic; Colonoscopy; Upper gastrointestinal endoscopy (N/A, 08/05/2022); Eye surgery (Left); Varicose vein surgery (Bilateral); and Sleeve Gastrectomy (N/A, 9/12/2022). Mr. Dylan Horner   Current Outpatient Medications   Medication Sig Dispense Refill    traZODone (DESYREL) 50 MG tablet Take 1 tablet by mouth nightly as needed for Sleep 30 tablet 5    temazepam (RESTORIL) 30 MG capsule       ondansetron (ZOFRAN) 4 MG tablet Take 1 tablet by mouth 3 times daily as needed for Nausea or Vomiting 30 tablet 0    triamcinolone (KENALOG) 0.1 % lotion Apply topically 3 times daily Apply topically 3 times daily. 1 each 1    omeprazole (PRILOSEC) 40 MG delayed release capsule Take 1 capsule by mouth in the morning and 1 capsule before bedtime. 90 capsule 3    hydroCHLOROthiazide (HYDRODIURIL) 25 MG tablet Take 1 tablet by mouth in the morning. 30 tablet 11    DULoxetine (CYMBALTA) 60 MG extended release capsule 2 po qd 180 capsule 3    hydrocortisone (ANUSOL-HC) 25 MG suppository Place 1 suppository rectally in the morning and 1 suppository in the evening.  20 suppository 11    Ascorbic Acid (VITAMIN C CR) 1000 MG TBCR Take by mouth      Diclofenac Sodium 1.5 % SOLN 40 drops to a knee 4 times a day      fluticasone (FLONASE) 50 MCG/ACT nasal spray 2 sprays by Nasal route as needed      Glucosamine 500 MG CAPS Take 500 mg by mouth in the morning and at bedtime      Magnesium 500 MG CAPS Take 500 mg by mouth daily      mirtazapine (REMERON) 30 MG tablet Take 30 mg by mouth nightly      tamsulosin (FLOMAX) 0.4 MG capsule TAKE 1 CAPSULE NIGHTLY       No current facility-administered medications for this visit. Mr. Connor Miss History   Problem Relation Age of Onset    Cancer Paternal Uncle         leukemia    Hypertension Maternal Grandfather     Cancer Father         Lung cancer - smoker    Glaucoma Maternal Grandmother     Cancer Paternal Grandmother         lung cancer - smoker       Mr. Naranjo    Social History     Socioeconomic History    Marital status:      Spouse name: Not on file    Number of children: Not on file    Years of education: Not on file    Highest education level: Not on file   Occupational History    Not on file   Tobacco Use    Smoking status: Former     Packs/day: 1.00     Years: 25.00     Pack years: 25.00     Types: Cigarettes     Start date: 5     Quit date: 10/11/2004     Years since quittin.1    Smokeless tobacco: Never   Vaping Use    Vaping Use: Never used   Substance and Sexual Activity    Alcohol use: Yes     Comment: drink every two months or so    Drug use: No    Sexual activity: Not on file   Other Topics Concern    Not on file   Social History Narrative    Not on file     Social Determinants of Health     Financial Resource Strain: Not on file   Food Insecurity: Not on file   Transportation Needs: Not on file   Physical Activity: Not on file   Stress: Not on file   Social Connections: Not on file   Intimate Partner Violence: Not on file   Housing Stability: Not on file       Mr. Todd Ferrell  has the following allergies: Allergies   Allergen Reactions    Bee Venom Shortness Of Breath and Swelling     Carried epi pen    Wasp Venom Protein Anaphylaxis    Morphine Other (See Comments)     \"makes me mean\"  Other reaction(s):  Altered Mental State-Intolerance       BP (!) 149/78   Pulse 96   Temp 97.2 °F (36.2 °C)   Resp 16   Ht 6' (1.829 m)   Wt (!) 348 lb (157.9 kg)   SpO2 98%   BMI 47.20 kg/m²     HEENT: Normocephalic, atraumatic, pupils equal and reactive to light. Neck: Supple, no masses or thyromegaly. Lungs: clear to auscultation bilaterally. CV: regular rate and rhythm, without murmurs, rubs, or gallops. Ext: No lower extremity edema,    Diabetic foot exam:   Left Foot:   Visual Exam: normal   Pulse DP: 2+ (normal)   Filament test: absent sensation   Vibratory Sensation: normal  Right Foot:   Visual Exam: normal   Pulse DP: 2+ (normal)   Filament test: absent sensation       No results found for this visit on 11/14/22. Silvina Oconnell was seen today for diabetes. Diagnoses and all orders for this visit:    Controlled type 2 diabetes mellitus without complication, without long-term current use of insulin ([facility] [address]  -     Comprehensive Metabolic Panel; Future  -     Microalbumin / Creatinine Urine Ratio; Future  -     Lipid Panel; Future  -     Hemoglobin A1C; Future    Type 2 diabetes mellitus with diabetic neuropathy, without long-term current use of insulin ([facility])    Restless leg    Spinal stenosis, unspecified spinal region    Class 3 severe obesity due to excess calories with serious comorbidity and body mass index (BMI) of 45.0 to 49.9 in adult [facility])    Hip arthritis    DDD (degenerative disc disease), lumbar    Primary insomnia  -     traZODone (DESYREL) 50 MG tablet; Take 1 tablet by mouth nightly as needed for Sleep    Neuropathy of both feet      Patient counseled on diet and exercise. He will work diligently on diet. If his A1c is still up in 3 months he will need a medication. Try trazodone for sleep.   Kenney Estrada MD

## 2022-11-18 NOTE — PROGRESS NOTES
Darrell Szymanski, MS, RD, LD  Surgical Weight Loss Dietitian  [address], [address] [facility]  Katharina Ordonez  Phone [phone]   Fax [phone]    [facility] NUTRITION REASSESSMENT  Anthropometrics:    Ht: 60, wt: 342#, 186% IBW, 46.4 BMI  Pt has lost 13 lbs since last visit. Assessment:  Pt is 10 weeks s/p VSG. Pt is doing well with Soft diet compliance. Intake of ~120g protein/d and ~64+oz fluid/d. He is waiting 30 minutes after eating to drink liquids. Pt is eating and drinking regular coffee, crystal light, gatorade zero, diet Mt. Dew, chicken, pork, steak, pasta, oatmeal, eggs, protein bars, apples, schuler beans, baked potato, rice. Pt is exercising via biking and strength training for 30-60 minutes 3x week. Pt is taking MVI and Ca supplements as recommended. Pt reports some abdominal pain, potentially with overeating, and feeling like he needs to bring food back up regularly. Nutrition Diagnosis:   Altered GI function R/T wt loss surgery as evidenced by s/p VSG. Class III obesity R/T excessive energy intake as evidenced by BMI = 46.4 and 186 % IBW.  --ongoing, modified--BMI and %IBW adjusted to reflect weight loss--     Intervention:   Discussed food choices and meal ideas on Regular Bariatric diet, once released by surgeon. Encouraged pt to follow mindful eating behaviors, lean protein focus followed by non-starchy vegetables as able. Encouraged pt use protein supplements throughout the day as snacks rather than as meal replacement. Encouraged continued and increased activity as tolerated. Monitoring and Evaluation:  Monitor for continued safe, supervised weight loss for VSG. Monitor pt for meeting requirements of 60-80g/d protein, 64+ fl oz/d fluids. Follow for eating appropriate portions, stopping eating when comfortably full. Follow for elimination of caffeine. Follow for tolerance of Regular Bariatric diet. Follow for increased activity.   F/U per MD Josie Sherman Liza, MS, RD, LD

## 2022-11-21 ENCOUNTER — OFFICE VISIT (OUTPATIENT)
Dept: SURGERY | Age: 57
End: 2022-11-21

## 2022-11-21 VITALS — SYSTOLIC BLOOD PRESSURE: 142 MMHG | DIASTOLIC BLOOD PRESSURE: 87 MMHG | HEART RATE: 109 BPM

## 2022-11-21 DIAGNOSIS — Z98.84 S/P LAPAROSCOPIC SLEEVE GASTRECTOMY: ICD-10-CM

## 2022-11-21 DIAGNOSIS — Z71.82 EXERCISE COUNSELING: ICD-10-CM

## 2022-11-21 DIAGNOSIS — E66.01 OBESITY, CLASS III, BMI 40-49.9 (MORBID OBESITY) (HCC): ICD-10-CM

## 2022-11-21 DIAGNOSIS — E66.01 MORBID OBESITY (HCC): Primary | ICD-10-CM

## 2022-11-21 DIAGNOSIS — R10.13 EPIGASTRIC PAIN: ICD-10-CM

## 2022-11-21 DIAGNOSIS — Z71.3 DIETARY COUNSELING: ICD-10-CM

## 2022-11-21 DIAGNOSIS — K59.00 CONSTIPATION, UNSPECIFIED CONSTIPATION TYPE: ICD-10-CM

## 2022-11-21 PROCEDURE — 99024 POSTOP FOLLOW-UP VISIT: CPT | Performed by: PHYSICIAN ASSISTANT

## 2022-11-21 NOTE — PROGRESS NOTES
Eva Varghese PA-C  Bariatric & Advanced Laparoscopic Surgery & Endoscopy  [address] [facility] [address], [address] [facility], [address]  Phone [phone]   Fax [phone]    Date of visit: 2022          Name: Valerie Aponte      MRN: [id number]       : 1965       Age: 61 y.o. Sex: male        PCP: Dinora Beck MD     Surgeon: Dr. Raman Mendoza. Patricio  Procedure: laparoscopic sleeve gastrectomy    HPI:    10 weeks post-op visit after a laparoscopic sleeve gastrectomy was done on 2022. He has lost 13 lbs since his last office visit. Weight History Graph  Post-Surgical Weight Loss  Date: 22  Height: 6' (182.9 cm)  Weight: 342 lb (155.1 kg)  BMI: 46.38  Weight Change: -13 lbs  Total Weight Change: -33 lbs  % EBWL: 17%    Evaluation of Pre-operative Co-morbid Conditions:    Sleep Apnea Yes, uses CPAP- Yes   Diabetes Yes, insulin dependent- No   GERD Yes, treatment medication- Omeprazole      Clinical Assessment and Physical Exam:    He is doing very well today and is feeling good. He reports that he has been adhering to the regular diet without difficulty. He admits to abdominal pain with over eating and with eating red meat, and notes this feels more like an upset stomach. He denies any nausea, vomiting or heartburn. He denies fevers or chills, or any redness or drainage from the incision sites. He does report having problems with constipation for which he takes stool softeners. His pain is well controlled and is not taking pain medications. He has been taking the PPI without difficulty. He has been doing his protein shakes without difficulty. He admits to eating carbs such as bread, pasta and protein bars. Protein:  120 grams per day   Fluids:    64+ ounces per day  Exercise:  recumbent bike and resistance bands 3x per week for 60 minutes  Denies reflux. Denies dysphagia. Tolerating Protein first diet without difficulty.     PMH:  Past Medical History: Diagnosis Date    Arthritis     osteo - in back, hips    Benign prostatic hyperplasia     Chronic pain     hips, neck, back    Diabetes mellitus ([facility] [address]     Controlled type 2 diabetes mellitus without complication, without long-term current use of insulin;  no bs checks at home; no meds; A1c=6.3 on 7/7/22    Fatty liver     Former smoker     GERD (gastroesophageal reflux disease)     on med for control    History of staph infection 1996    appendix ruptured    Hypertension     on med for control    Ill-defined disease     swelling of both lower legs- SCIATICA- legs feel like \"burning\"    Impaired mobility     walks with cane    Insomnia     Morbid obesity (HCC)     BMI=51.81    Other ill-defined conditions(799.89)     stuffy nose/ infection    Other ill-defined conditions(799.89)     spinal stenosis    Other ill-defined conditions(799.89)     several kidney stones-passed on own    Peptic ulcer 10/11/2013    Peripheral neuropathy     hands and feet    Unspecified sleep apnea     c-pap nightly; instructed to bring dos       PSH:  Past Surgical History:   Procedure Laterality Date    APPENDECTOMY      COLONOSCOPY      ENDOSCOPY, COLON, DIAGNOSTIC      EYE SURGERY Left     SINUS SURGERY      sinus surgery; nasal polyps removed - Dr. Keshav Pagan 9/12/2022    ERAS/ GASTRECTOMY SLEEVE LAPAROSCOPIC - DIFFICULT AIRWAY SUSPECTED performed by Valente Agarwal MD at [address] [place] [address] ENDOSCOPY N/A 08/05/2022    EGD BIOPSY performed by Valente Agarwal MD at [facility] ENDOSCOPY    VARICOSE VEIN SURGERY Bilateral        MEDS:  Current Outpatient Medications   Medication Sig Dispense Refill    traZODone (DESYREL) 50 MG tablet Take 1 tablet by mouth nightly as needed for Sleep 30 tablet 5    temazepam (RESTORIL) 30 MG capsule       ondansetron (ZOFRAN) 4 MG tablet Take 1 tablet by mouth 3 times daily as needed for Nausea or Vomiting 30 tablet 0 triamcinolone (KENALOG) 0.1 % lotion Apply topically 3 times daily Apply topically 3 times daily. 1 each 1    omeprazole (PRILOSEC) 40 MG delayed release capsule Take 1 capsule by mouth in the morning and 1 capsule before bedtime. 90 capsule 3    hydroCHLOROthiazide (HYDRODIURIL) 25 MG tablet Take 1 tablet by mouth in the morning. 30 tablet 11    DULoxetine (CYMBALTA) 60 MG extended release capsule 2 po qd 180 capsule 3    hydrocortisone (ANUSOL-HC) 25 MG suppository Place 1 suppository rectally in the morning and 1 suppository in the evening. 20 suppository 11    Ascorbic Acid (VITAMIN C CR) 1000 MG TBCR Take by mouth      Diclofenac Sodium 1.5 % SOLN 40 drops to a knee 4 times a day      fluticasone (FLONASE) 50 MCG/ACT nasal spray 2 sprays by Nasal route as needed      Glucosamine 500 MG CAPS Take 500 mg by mouth in the morning and at bedtime      Magnesium 500 MG CAPS Take 500 mg by mouth daily      mirtazapine (REMERON) 30 MG tablet Take 30 mg by mouth nightly      tamsulosin (FLOMAX) 0.4 MG capsule TAKE 1 CAPSULE NIGHTLY       No current facility-administered medications for this visit. ALLERGIES:    Allergies   Allergen Reactions    Bee Venom Shortness Of Breath and Swelling     Carried epi pen    Wasp Venom Protein Anaphylaxis    Morphine Other (See Comments)     \"makes me mean\"  Other reaction(s):  Altered Mental State-Intolerance       SH:  Social History     Tobacco Use    Smoking status: Former     Packs/day: 1.00     Years: 25.00     Pack years: 25.00     Types: Cigarettes     Start date:      Quit date: 10/11/2004     Years since quittin.1    Smokeless tobacco: Never   Vaping Use    Vaping Use: Never used   Substance Use Topics    Alcohol use: Yes     Comment: drink every two months or so    Drug use: No       FH:  Family History   Problem Relation Age of Onset    Cancer Paternal Uncle         leukemia    Hypertension Maternal Grandfather     Cancer Father         Lung cancer - smoker Glaucoma Maternal Grandmother     Cancer Paternal Grandmother         lung cancer - smoker       Review of systems:  The patient has no difficulty with chest pain or shortness of breath. No fever or chills. Comprehensive 13 point review of systems was otherwise unremarkable except as noted above. Physical Exam:     BP (!) 142/87   Pulse (!) 109     General:  Well-developed, well-nourished, no distress. Psych:  Cooperative, good insight and judgement. Neuro:  Alert, oriented to person, place and time. Lungs:  Unlabored breathing. Symmetrical chest expansion. Heart:  Regular rate and rhythm. Abdomen:  Soft, non-tender, non-distended. No guarding or rebound. Lap incisions are healing well. Labs: All labs reviewed today. Imaging: All imaging reviewed today. Diagnosis Orders   1. Morbid obesity ([address]        2. Obesity, Class III, BMI 40-49.9 (morbid obesity) ([address]        3. S/P laparoscopic sleeve gastrectomy        4. Dietary counseling        5. Exercise counseling        6. Epigastric pain        7. Constipation, unspecified constipation type            Assessment/Plan:    Aretha Holliday is a 61 y.o. male here to follow up s/p laparoscopic sleeve gastrectomy    He is doing well without any major concerns, his pain is likely due to dietary habits. He is adhering to the diet and we discussed and addressed all his questions. I encouraged him to continue physical activity and aim for 300 minutes a week and include 2 strength session a week to maintain the weight loss. He will continue recommended vitamin/mineral supplementation and PPI until the prescription is complete. Return to the office in 4 week with Dr. Holly Spear.     Esther Velázquez PA-C  11/21/2022    Counseling time:counseling time more than 50% of visit: 25 minutes : I spent this time preparing to see patient (including chart review and preparation), obtaining and/or reviewing additional medical history, performing a physical exam and evaluation, documenting clinical information in the electronic health record, independently interpreting results, communicating results to patient, family or caregiver, and/or coordinating care.

## 2023-01-23 ENCOUNTER — NURSE ONLY (OUTPATIENT)
Dept: UROLOGY | Age: 58
End: 2023-01-23

## 2023-01-23 DIAGNOSIS — N40.0 BENIGN PROSTATIC HYPERPLASIA WITHOUT LOWER URINARY TRACT SYMPTOMS: ICD-10-CM

## 2023-01-23 DIAGNOSIS — N40.0 BENIGN PROSTATIC HYPERPLASIA WITHOUT LOWER URINARY TRACT SYMPTOMS: Primary | ICD-10-CM

## 2023-01-23 LAB — PSA SERPL-MCNC: 0.2 NG/ML

## 2023-01-30 ENCOUNTER — OFFICE VISIT (OUTPATIENT)
Dept: UROLOGY | Age: 58
End: 2023-01-30
Payer: COMMERCIAL

## 2023-01-30 DIAGNOSIS — N13.8 BPH WITH OBSTRUCTION/LOWER URINARY TRACT SYMPTOMS: Primary | ICD-10-CM

## 2023-01-30 DIAGNOSIS — N41.1 CHRONIC PROSTATITIS: ICD-10-CM

## 2023-01-30 DIAGNOSIS — N40.1 BPH WITH OBSTRUCTION/LOWER URINARY TRACT SYMPTOMS: Primary | ICD-10-CM

## 2023-01-30 LAB
BILIRUBIN, URINE, POC: NEGATIVE
BLOOD URINE, POC: NEGATIVE
GLUCOSE URINE, POC: NEGATIVE
KETONES, URINE, POC: 15
LEUKOCYTE ESTERASE, URINE, POC: NEGATIVE
NITRITE, URINE, POC: NEGATIVE
PH, URINE, POC: 5.5 (ref 4.6–8)
PROTEIN,URINE, POC: 30
SPECIFIC GRAVITY, URINE, POC: 1.02 (ref 1–1.03)
URINALYSIS CLARITY, POC: ABNORMAL
URINALYSIS COLOR, POC: ABNORMAL
UROBILINOGEN, POC: ABNORMAL

## 2023-01-30 PROCEDURE — 81003 URINALYSIS AUTO W/O SCOPE: CPT | Performed by: NURSE PRACTITIONER

## 2023-01-30 PROCEDURE — 99214 OFFICE O/P EST MOD 30 MIN: CPT | Performed by: NURSE PRACTITIONER

## 2023-01-30 RX ORDER — TAMSULOSIN HYDROCHLORIDE 0.4 MG/1
0.4 CAPSULE ORAL DAILY
Qty: 90 CAPSULE | Refills: 3 | Status: SHIPPED | OUTPATIENT
Start: 2023-01-30

## 2023-01-30 ASSESSMENT — ENCOUNTER SYMPTOMS
EYES NEGATIVE: 1
RESPIRATORY NEGATIVE: 1

## 2023-01-30 NOTE — PROGRESS NOTES
[facility]  [address] [facility] [address], [address] [facility]  [phone]          Kelley Cantrell  : 1965    Chief Complaint   Patient presents with    Other     Yearly           HPI     Kelley Cantrell is a 61 y.o. male returns in follow up for chronic prostatitis. Pt reports two flares in past yr self treated with Cipro. Previously on IM testosterone that he stopped due to LE swelling. PSA remains stable at 0.2 on 23. Cont to struggle with lower back issues and requires narcotics for pain. Reports improved hesitancy on Flomax. Occ nocturia. Reports history of stones. Declined hematuria at prior visit. Denies any flank pain or recurrent hematuria. Prior abd ultrasound showed a small L renal stone.           Past Medical History:   Diagnosis Date    Arthritis     osteo - in back, hips    Benign prostatic hyperplasia     Chronic pain     hips, neck, back    Diabetes mellitus ([address]     Controlled type 2 diabetes mellitus without complication, without long-term current use of insulin;  no bs checks at home; no meds; A1c=6.3 on 22    Fatty liver     Former smoker     GERD (gastroesophageal reflux disease)     on med for control    History of staph infection     appendix ruptured    Hypertension     on med for control    Ill-defined disease     swelling of both lower legs- SCIATICA- legs feel like \"burning\"    Impaired mobility     walks with cane    Insomnia     Morbid obesity (HCC)     BMI=51.81    Other ill-defined conditions(799.89)     stuffy nose/ infection    Other ill-defined conditions(799.89)     spinal stenosis    Other ill-defined conditions(799.89)     several kidney stones-passed on own    Peptic ulcer 10/11/2013    Peripheral neuropathy     hands and feet    Unspecified sleep apnea     c-pap nightly; instructed to bring dos     Past Surgical History:   Procedure Laterality Date    APPENDECTOMY      COLONOSCOPY      ENDOSCOPY, COLON, DIAGNOSTIC      EYE SURGERY Left     SINUS SURGERY      sinus surgery; nasal polyps removed - Dr. Xavier Garcia 9/12/2022    ERAS/ GASTRECTOMY SLEEVE LAPAROSCOPIC - DIFFICULT AIRWAY SUSPECTED performed by Dony Edward MD at [address] [facility] [address] ENDOSCOPY N/A 08/05/2022    EGD BIOPSY performed by Dony Edward MD at [place] [address] Bilateral      Current Outpatient Medications   Medication Sig Dispense Refill    tamsulosin (FLOMAX) 0.4 MG capsule Take 1 capsule by mouth daily TAKE 1 CAPSULE NIGHTLY 90 capsule 3    traZODone (DESYREL) 50 MG tablet Take 1 tablet by mouth nightly as needed for Sleep 30 tablet 5    temazepam (RESTORIL) 30 MG capsule       ondansetron (ZOFRAN) 4 MG tablet Take 1 tablet by mouth 3 times daily as needed for Nausea or Vomiting 30 tablet 0    triamcinolone (KENALOG) 0.1 % lotion Apply topically 3 times daily Apply topically 3 times daily. 1 each 1    omeprazole (PRILOSEC) 40 MG delayed release capsule Take 1 capsule by mouth in the morning and 1 capsule before bedtime. 90 capsule 3    hydroCHLOROthiazide (HYDRODIURIL) 25 MG tablet Take 1 tablet by mouth in the morning. 30 tablet 11    DULoxetine (CYMBALTA) 60 MG extended release capsule 2 po qd 180 capsule 3    hydrocortisone (ANUSOL-HC) 25 MG suppository Place 1 suppository rectally in the morning and 1 suppository in the evening. 20 suppository 11    Ascorbic Acid (VITAMIN C CR) 1000 MG TBCR Take by mouth      Diclofenac Sodium 1.5 % SOLN 40 drops to a knee 4 times a day      fluticasone (FLONASE) 50 MCG/ACT nasal spray 2 sprays by Nasal route as needed      Glucosamine 500 MG CAPS Take 500 mg by mouth in the morning and at bedtime      Magnesium 500 MG CAPS Take 500 mg by mouth daily      mirtazapine (REMERON) 30 MG tablet Take 30 mg by mouth nightly       No current facility-administered medications for this visit.      Allergies   Allergen Reactions Bee Venom Shortness Of Breath and Swelling     Carried epi pen    Wasp Venom Protein Anaphylaxis    Morphine Other (See Comments)     \"makes me mean\"  Other reaction(s):  Altered Mental State-Intolerance     Social History     Socioeconomic History    Marital status:      Spouse name: Not on file    Number of children: Not on file    Years of education: Not on file    Highest education level: Not on file   Occupational History    Not on file   Tobacco Use    Smoking status: Former     Packs/day: 1.00     Years: 25.00     Pack years: 25.00     Types: Cigarettes     Start date: 5     Quit date: 10/11/2004     Years since quittin.3    Smokeless tobacco: Never   Vaping Use    Vaping Use: Never used   Substance and Sexual Activity    Alcohol use: Yes     Comment: drink every two months or so    Drug use: No    Sexual activity: Not on file   Other Topics Concern    Not on file   Social History Narrative    Not on file     Social Determinants of Health     Financial Resource Strain: Not on file   Food Insecurity: Not on file   Transportation Needs: Not on file   Physical Activity: Not on file   Stress: Not on file   Social Connections: Not on file   Intimate Partner Violence: Not on file   Housing Stability: Not on file     Family History   Problem Relation Age of Onset    Cancer Paternal Uncle         leukemia    Hypertension Maternal Grandfather     Cancer Father         Lung cancer - smoker    Glaucoma Maternal Grandmother     Cancer Paternal Grandmother         lung cancer - smoker       Review of Systems  Constitutional: Negative  Skin: Negative skin ROS  Eyes: Eyes negative  ENT: HENT negative  Respiratory: Respiratory negative  Cardiovascular: Neg cardio ROS  GI: Neg GI ROS  Genitourinary: Genitourinary negative  Musculoskeletal: Musculoskeletal negative  Psychological: Neg psych ROS  Endocrine: Endocrine negative  Hem/Lymphatic: Hematologic/lymphatic negative    Urinalysis  UA - Dipstick  Results for orders placed or performed in visit on 01/30/23   AMB POC URINALYSIS DIP STICK AUTO W/O MICRO   Result Value Ref Range    Color (UA POC)      Clarity (UA POC)      Glucose, Urine, POC Negative Negative    Bilirubin, Urine, POC Negative Negative    KETONES, Urine, POC 15 Negative    Specific Gravity, Urine, POC 1.020 1.001 - 1.035    Blood (UA POC) Negative Negative    pH, Urine, POC 5.5 4.6 - 8.0    Protein, Urine, POC 30 Negative    Urobilinogen, POC 2 mg/dL     Nitrite, Urine, POC Negative Negative    Leukocyte Esterase, Urine, POC Negative Negative       UA - Micro  WBC - 0  RBC - 0  Bacteria - 0  Epith - 0    PHYSICAL EXAM    General appearance - well appearing and in no distress  Mental status - alert, oriented to person, place, and time  Neck - supple, no significant adenopathy  Chest/Lung-  Quiet, even and easy respiratory effort without use of accessory muscles  Rectal/VASU- anodular prostate   Skin - normal coloration and turgor, no rashes      Assessment and Plan    ICD-10-CM    1. BPH with obstruction/lower urinary tract symptoms  N40.1 AMB POC URINALYSIS DIP STICK AUTO W/O MICRO    N13.8 PSA, Diagnostic      2. Chronic prostatitis  N41.1 AMB POC URINALYSIS DIP STICK AUTO W/O MICRO          BPH/LUTS- stable. Urine normal. PSA low. VASU normal. Cont Flomax 0.4 mg PO daily. Prostatitis- stable. Using Cipro 500 mg PO BID PRN prostatitis. RTO in 1 year for follow up. PSA prior. Advised to call sooner if symptoms worsen. Jann Louis, FNP, APRN - CNP  Dr. Jennifer Shah is supervising physician today and he approves plan of care.

## 2023-02-13 ENCOUNTER — NURSE ONLY (OUTPATIENT)
Dept: FAMILY MEDICINE CLINIC | Facility: CLINIC | Age: 58
End: 2023-02-13

## 2023-02-13 DIAGNOSIS — E11.9 CONTROLLED TYPE 2 DIABETES MELLITUS WITHOUT COMPLICATION, WITHOUT LONG-TERM CURRENT USE OF INSULIN (HCC): ICD-10-CM

## 2023-02-13 DIAGNOSIS — N13.8 BPH WITH OBSTRUCTION/LOWER URINARY TRACT SYMPTOMS: ICD-10-CM

## 2023-02-13 DIAGNOSIS — N40.1 BPH WITH OBSTRUCTION/LOWER URINARY TRACT SYMPTOMS: ICD-10-CM

## 2023-02-13 LAB
ALBUMIN SERPL-MCNC: 3.6 G/DL (ref 3.5–5)
ALBUMIN/GLOB SERPL: 0.9 (ref 0.4–1.6)
ALP SERPL-CCNC: 101 U/L (ref 50–136)
ALT SERPL-CCNC: 46 U/L (ref 12–65)
ANION GAP SERPL CALC-SCNC: 8 MMOL/L (ref 2–11)
AST SERPL-CCNC: 17 U/L (ref 15–37)
BILIRUB SERPL-MCNC: 0.4 MG/DL (ref 0.2–1.1)
BUN SERPL-MCNC: 16 MG/DL (ref 6–23)
CALCIUM SERPL-MCNC: 9.3 MG/DL (ref 8.3–10.4)
CHLORIDE SERPL-SCNC: 103 MMOL/L (ref 101–110)
CHOLEST SERPL-MCNC: 161 MG/DL
CO2 SERPL-SCNC: 28 MMOL/L (ref 21–32)
CREAT SERPL-MCNC: 0.8 MG/DL (ref 0.8–1.5)
EST. AVERAGE GLUCOSE BLD GHB EST-MCNC: 117 MG/DL
GLOBULIN SER CALC-MCNC: 4.2 G/DL (ref 2.8–4.5)
GLUCOSE SERPL-MCNC: 111 MG/DL (ref 65–100)
HBA1C MFR BLD: 5.7 % (ref 4.8–5.6)
HDLC SERPL-MCNC: 35 MG/DL (ref 40–60)
HDLC SERPL: 4.6
LDLC SERPL CALC-MCNC: 100.4 MG/DL
POTASSIUM SERPL-SCNC: 4 MMOL/L (ref 3.5–5.1)
PROT SERPL-MCNC: 7.8 G/DL (ref 6.3–8.2)
SODIUM SERPL-SCNC: 139 MMOL/L (ref 133–143)
TRIGL SERPL-MCNC: 128 MG/DL (ref 35–150)
VLDLC SERPL CALC-MCNC: 25.6 MG/DL (ref 6–23)

## 2023-02-14 LAB
CREAT UR-MCNC: 249 MG/DL
MICROALBUMIN UR-MCNC: 5.82 MG/DL (ref 0–3)
MICROALBUMIN/CREAT UR-RTO: 23 MG/G (ref 0–30)

## 2023-02-16 ENCOUNTER — TELEPHONE (OUTPATIENT)
Dept: FAMILY MEDICINE CLINIC | Facility: CLINIC | Age: 58
End: 2023-02-16

## 2023-02-16 DIAGNOSIS — F51.01 PRIMARY INSOMNIA: ICD-10-CM

## 2023-02-16 RX ORDER — TRAZODONE HYDROCHLORIDE 50 MG/1
50 TABLET ORAL NIGHTLY PRN
Qty: 90 TABLET | Refills: 0 | Status: SHIPPED | OUTPATIENT
Start: 2023-02-16

## 2023-02-16 NOTE — TELEPHONE ENCOUNTER
Received the following message from [employer], the lab is aware of the issue and will ensure this does not happen in the future.  Please contact the patient for a recollection    Jenaro Knutson ([id number]) : 1965; Collection Date: 23; ; [place]; Problem: Frozen PSA was not in bag;  Frozen PSA was in a bag with another patients specimen

## 2023-02-16 NOTE — TELEPHONE ENCOUNTER
[facility] requesting a 90 day supply for patients Trazodone. Last OV 11/14/22.  Next OV 2/20/23

## 2023-02-17 NOTE — TELEPHONE ENCOUNTER
Spoke with pt and he states that another doctor albert this lab recently and he does not have to have this done at this time.  Please cancel test.

## 2023-02-20 ENCOUNTER — OFFICE VISIT (OUTPATIENT)
Dept: FAMILY MEDICINE CLINIC | Facility: CLINIC | Age: 58
End: 2023-02-20
Payer: COMMERCIAL

## 2023-02-20 VITALS
BODY MASS INDEX: 42.66 KG/M2 | SYSTOLIC BLOOD PRESSURE: 134 MMHG | OXYGEN SATURATION: 97 % | WEIGHT: 315 LBS | DIASTOLIC BLOOD PRESSURE: 75 MMHG | HEART RATE: 93 BPM | TEMPERATURE: 97.4 F | HEIGHT: 72 IN | RESPIRATION RATE: 16 BRPM

## 2023-02-20 DIAGNOSIS — F51.01 PRIMARY INSOMNIA: ICD-10-CM

## 2023-02-20 DIAGNOSIS — E11.40 TYPE 2 DIABETES MELLITUS WITH DIABETIC NEUROPATHY, WITHOUT LONG-TERM CURRENT USE OF INSULIN (HCC): ICD-10-CM

## 2023-02-20 DIAGNOSIS — G57.93 NEUROPATHY OF BOTH FEET: ICD-10-CM

## 2023-02-20 DIAGNOSIS — K27.9 PEPTIC ULCER: ICD-10-CM

## 2023-02-20 DIAGNOSIS — E11.9 CONTROLLED TYPE 2 DIABETES MELLITUS WITHOUT COMPLICATION, WITHOUT LONG-TERM CURRENT USE OF INSULIN (HCC): Primary | ICD-10-CM

## 2023-02-20 DIAGNOSIS — E66.01 CLASS 3 SEVERE OBESITY DUE TO EXCESS CALORIES WITH SERIOUS COMORBIDITY AND BODY MASS INDEX (BMI) OF 40.0 TO 44.9 IN ADULT (HCC): ICD-10-CM

## 2023-02-20 PROBLEM — E66.813 CLASS 3 SEVERE OBESITY DUE TO EXCESS CALORIES WITH SERIOUS COMORBIDITY AND BODY MASS INDEX (BMI) OF 45.0 TO 49.9 IN ADULT: Status: RESOLVED | Noted: 2022-09-12 | Resolved: 2023-02-20

## 2023-02-20 PROCEDURE — 99214 OFFICE O/P EST MOD 30 MIN: CPT | Performed by: FAMILY MEDICINE

## 2023-02-20 PROCEDURE — 3044F HG A1C LEVEL LT 7.0%: CPT | Performed by: FAMILY MEDICINE

## 2023-02-20 RX ORDER — NAPROXEN 500 MG/1
500 TABLET ORAL 2 TIMES DAILY WITH MEALS
COMMUNITY

## 2023-02-20 RX ORDER — DICLOFENAC SODIUM 16.05 MG/ML
SOLUTION TOPICAL
Status: CANCELLED | OUTPATIENT
Start: 2023-02-20

## 2023-02-20 RX ORDER — TRAZODONE HYDROCHLORIDE 50 MG/1
50 TABLET ORAL NIGHTLY PRN
Qty: 90 TABLET | Refills: 3 | Status: SHIPPED | OUTPATIENT
Start: 2023-02-20

## 2023-02-20 SDOH — ECONOMIC STABILITY: HOUSING INSECURITY
IN THE LAST 12 MONTHS, WAS THERE A TIME WHEN YOU DID NOT HAVE A STEADY PLACE TO SLEEP OR SLEPT IN A SHELTER (INCLUDING NOW)?: NO

## 2023-02-20 SDOH — ECONOMIC STABILITY: FOOD INSECURITY: WITHIN THE PAST 12 MONTHS, THE FOOD YOU BOUGHT JUST DIDN'T LAST AND YOU DIDN'T HAVE MONEY TO GET MORE.: NEVER TRUE

## 2023-02-20 SDOH — ECONOMIC STABILITY: FOOD INSECURITY: WITHIN THE PAST 12 MONTHS, YOU WORRIED THAT YOUR FOOD WOULD RUN OUT BEFORE YOU GOT MONEY TO BUY MORE.: NEVER TRUE

## 2023-02-20 SDOH — ECONOMIC STABILITY: INCOME INSECURITY: HOW HARD IS IT FOR YOU TO PAY FOR THE VERY BASICS LIKE FOOD, HOUSING, MEDICAL CARE, AND HEATING?: NOT HARD AT ALL

## 2023-02-20 ASSESSMENT — PATIENT HEALTH QUESTIONNAIRE - PHQ9
SUM OF ALL RESPONSES TO PHQ9 QUESTIONS 1 & 2: 0
SUM OF ALL RESPONSES TO PHQ QUESTIONS 1-9: 0
1. LITTLE INTEREST OR PLEASURE IN DOING THINGS: 0
2. FEELING DOWN, DEPRESSED OR HOPELESS: 0
SUM OF ALL RESPONSES TO PHQ QUESTIONS 1-9: 0

## 2023-02-20 NOTE — PROGRESS NOTES
[address] [facility]  Phone: [phone] Fax [phone]  Casper Shen MD  2/20/2023         Mr. Demi Romo  is a [id number].o.  year old  male patient who comes in to check on diabetes, hypertension, and high cholesterol. Checks blood sugars not at all. Sugars have been running unknown. Last eye exam was last year. Feet have neuropathy problems. Did  have a flu shot this year. Did have a pneumonia shot unknown. Did have a tetanus shot unknown. Has  been working on diet but cannot exercise due to his hips needing replacement. Blood pressure has been under good control. He did fall last month and spent 4 days in the hospital and they have decided they are going to do hip replacement which will be this Thursday. Suzy Balling He did have gastric bypass to help him to lose weight and he did lose another 25 pounds. Mr. Demi Romo  has  has a past medical history of Arthritis, Benign prostatic hyperplasia, Chronic pain, Diabetes mellitus ([address] Fatty liver, Former smoker, GERD (gastroesophageal reflux disease), History of staph infection, Hypertension, Ill-defined disease, Impaired mobility, Insomnia, Morbid obesity ([address] Other ill-defined conditions(799.89), Other ill-defined conditions(799.89), Other ill-defined conditions(799.89), Peptic ulcer, Peripheral neuropathy, and Unspecified sleep apnea. Mr. Demi Romo  has  has a past surgical history that includes Appendectomy; sinus surgery; Tonsillectomy and adenoidectomy; Endoscopy, colon, diagnostic; Colonoscopy; Upper gastrointestinal endoscopy (N/A, 08/05/2022); Eye surgery (Left); Varicose vein surgery (Bilateral); and Sleeve Gastrectomy (N/A, 9/12/2022).     Mr. Demi Romo   Current Outpatient Medications   Medication Sig Dispense Refill    naproxen (NAPROSYN) 500 MG tablet Take 500 mg by mouth 2 times daily (with meals)      traZODone (DESYREL) 50 MG tablet Take 1 tablet by mouth nightly as needed for Sleep 90 tablet 3 tamsulosin (FLOMAX) 0.4 MG capsule Take 1 capsule by mouth daily TAKE 1 CAPSULE NIGHTLY 90 capsule 3    ondansetron (ZOFRAN) 4 MG tablet Take 1 tablet by mouth 3 times daily as needed for Nausea or Vomiting 30 tablet 0    triamcinolone (KENALOG) 0.1 % lotion Apply topically 3 times daily Apply topically 3 times daily. 1 each 1    omeprazole (PRILOSEC) 40 MG delayed release capsule Take 1 capsule by mouth in the morning and 1 capsule before bedtime. 90 capsule 3    hydroCHLOROthiazide (HYDRODIURIL) 25 MG tablet Take 1 tablet by mouth in the morning. 30 tablet 11    DULoxetine (CYMBALTA) 60 MG extended release capsule 2 po qd 180 capsule 3    hydrocortisone (ANUSOL-HC) 25 MG suppository Place 1 suppository rectally in the morning and 1 suppository in the evening. 20 suppository 11    Ascorbic Acid (VITAMIN C CR) 1000 MG TBCR Take by mouth      Diclofenac Sodium 1.5 % SOLN 40 drops to a knee 4 times a day      fluticasone (FLONASE) 50 MCG/ACT nasal spray 2 sprays by Nasal route as needed      Glucosamine 500 MG CAPS Take 500 mg by mouth in the morning and at bedtime      Magnesium 500 MG CAPS Take 500 mg by mouth daily      mirtazapine (REMERON) 30 MG tablet Take 30 mg by mouth nightly       No current facility-administered medications for this visit. Mr. Barragan Gopaljarretjosiah History   Problem Relation Age of Onset    Cancer Paternal Uncle         leukemia    Hypertension Maternal Grandfather     Cancer Father         Lung cancer - smoker    Glaucoma Maternal Grandmother     Cancer Paternal Grandmother         lung cancer - smoker           Jose A    Social History     Socioeconomic History    Marital status:      Spouse name: Not on file    Number of children: Not on file    Years of education: Not on file    Highest education level: Not on file   Occupational History    Not on file   Tobacco Use    Smoking status: Former     Packs/day: 1.00     Years: 25.00     Pack years: 25.00     Types: Cigarettes Start date: 5     Quit date: 10/11/2004     Years since quittin.3    Smokeless tobacco: Never   Vaping Use    Vaping Use: Never used   Substance and Sexual Activity    Alcohol use: Yes     Comment: drink every two months or so    Drug use: No    Sexual activity: Not on file   Other Topics Concern    Not on file   Social History Narrative    Not on file     Social Determinants of Health     Financial Resource Strain: Low Risk     Difficulty of Paying Living Expenses: Not hard at all   Food Insecurity: No Food Insecurity    Worried About [address] in the Last Year: Never true    [address] [facility] [address] in the Last Year: Never true   Transportation Needs: Unknown    Lack of Transportation (Medical): Not on file    Lack of Transportation (Non-Medical): No   Physical Activity: Not on file   Stress: Not on file   Social Connections: Not on file   Intimate Partner Violence: Not on file   Housing Stability: Unknown    Unable to Pay for Housing in the Last Year: Not on file    Number of Places Lived in the Last Year: Not on file    Unstable Housing in the Last Year: No       Mr. Da Ayoub  has the following allergies: Allergies   Allergen Reactions    Bee Venom Shortness Of Breath and Swelling     Carried epi pen    Wasp Venom Protein Anaphylaxis    Morphine Other (See Comments)     \"makes me mean\"  Other reaction(s): Altered Mental State-Intolerance       /75   Pulse 93   Temp 97.4 °F (36.3 °C)   Resp 16   Ht 6' (1.829 m)   Wt (!) 325 lb (147.4 kg)   SpO2 97%   BMI 44.08 kg/m²     HEENT: Normocephalic, atraumatic, pupils equal and reactive to light. Neck: Supple, no masses or thyromegaly. Lungs: clear to auscultation bilaterally. CV: regular rate and rhythm, without murmurs, rubs, or gallops. Patient walks very slowly due to hip pain. Did go over his lab work. He got his A1c down to 5.7. No results found for this visit on 23. Trent Tilley was seen today for diabetes.     Diagnoses and all orders for this visit:    Controlled type 2 diabetes mellitus without complication, without long-term current use of insulin ([facility] [address]  -     Comprehensive Metabolic Panel; Future  -     Microalbumin / Creatinine Urine Ratio; Future  -     Lipid Panel; Future  -     Hemoglobin A1C; Future    Primary insomnia  -     traZODone (DESYREL) 50 MG tablet; Take 1 tablet by mouth nightly as needed for Sleep    Class 3 severe obesity due to excess calories with serious comorbidity and body mass index (BMI) of 40.0 to 44.9 in adult [facility])    Type 2 diabetes mellitus with diabetic neuropathy, without long-term current use of insulin ([facility])    Peptic ulcer    Neuropathy of both feet    Continue to work on his diet. Once he has his hip replaced hopefully he will be able to walk for exercise. Follow-up 6 months. We will watch the protein in his urine. Patient counseled on diet and exercise.     Eligio Eaton MD

## 2023-03-05 ENCOUNTER — PATIENT MESSAGE (OUTPATIENT)
Dept: FAMILY MEDICINE CLINIC | Facility: CLINIC | Age: 58
End: 2023-03-05

## 2023-03-07 RX ORDER — DICLOFENAC SODIUM 16.05 MG/ML
SOLUTION TOPICAL
Qty: 100 ML | Refills: 11 | Status: SHIPPED | OUTPATIENT
Start: 2023-03-07

## 2023-03-07 NOTE — TELEPHONE ENCOUNTER
From: Selena Gilbert  To: Dr. Jessica Mondragon: 3/5/2023 5:20 PM EST  Subject: Prescription    I'm just following up from my office visit to make sure that the prescription for the Diclofenac sodium usp 1.5% was sent in .     Thank you   Deidre Hamilton

## 2023-04-25 ENCOUNTER — TELEPHONE (OUTPATIENT)
Dept: FAMILY MEDICINE CLINIC | Facility: CLINIC | Age: 58
End: 2023-04-25

## 2023-04-25 NOTE — TELEPHONE ENCOUNTER
Plan of care from [employer]. ([id number]) placed in dr's in tray to be signed and faxed back.

## 2023-05-10 ENCOUNTER — OFFICE VISIT (OUTPATIENT)
Dept: FAMILY MEDICINE CLINIC | Facility: CLINIC | Age: 58
End: 2023-05-10
Payer: COMMERCIAL

## 2023-05-10 VITALS
SYSTOLIC BLOOD PRESSURE: 126 MMHG | WEIGHT: 315 LBS | BODY MASS INDEX: 42.66 KG/M2 | OXYGEN SATURATION: 98 % | TEMPERATURE: 97.2 F | HEART RATE: 88 BPM | DIASTOLIC BLOOD PRESSURE: 68 MMHG | HEIGHT: 72 IN | RESPIRATION RATE: 16 BRPM

## 2023-05-10 DIAGNOSIS — M16.10 HIP ARTHRITIS: ICD-10-CM

## 2023-05-10 DIAGNOSIS — M25.561 CHRONIC PAIN OF BOTH KNEES: Primary | ICD-10-CM

## 2023-05-10 DIAGNOSIS — M54.16 LUMBAR RADICULOPATHY, CHRONIC: ICD-10-CM

## 2023-05-10 DIAGNOSIS — F51.01 PRIMARY INSOMNIA: ICD-10-CM

## 2023-05-10 DIAGNOSIS — G57.93 NEUROPATHY OF BOTH FEET: ICD-10-CM

## 2023-05-10 DIAGNOSIS — M48.00 SPINAL STENOSIS, UNSPECIFIED SPINAL REGION: ICD-10-CM

## 2023-05-10 DIAGNOSIS — M79.89 LOCALIZED SWELLING OF BOTH LOWER EXTREMITIES: ICD-10-CM

## 2023-05-10 DIAGNOSIS — L03.115 CELLULITIS OF RIGHT HIP: ICD-10-CM

## 2023-05-10 DIAGNOSIS — E11.9 CONTROLLED TYPE 2 DIABETES MELLITUS WITHOUT COMPLICATION, WITHOUT LONG-TERM CURRENT USE OF INSULIN (HCC): ICD-10-CM

## 2023-05-10 DIAGNOSIS — M25.562 CHRONIC PAIN OF BOTH KNEES: Primary | ICD-10-CM

## 2023-05-10 DIAGNOSIS — G89.29 CHRONIC PAIN OF BOTH KNEES: Primary | ICD-10-CM

## 2023-05-10 DIAGNOSIS — K21.9 GASTROESOPHAGEAL REFLUX DISEASE WITHOUT ESOPHAGITIS: ICD-10-CM

## 2023-05-10 DIAGNOSIS — E11.40 TYPE 2 DIABETES MELLITUS WITH DIABETIC NEUROPATHY, WITHOUT LONG-TERM CURRENT USE OF INSULIN (HCC): ICD-10-CM

## 2023-05-10 PROCEDURE — 3044F HG A1C LEVEL LT 7.0%: CPT | Performed by: FAMILY MEDICINE

## 2023-05-10 PROCEDURE — 99214 OFFICE O/P EST MOD 30 MIN: CPT | Performed by: FAMILY MEDICINE

## 2023-05-10 RX ORDER — OMEPRAZOLE 40 MG/1
40 CAPSULE, DELAYED RELEASE ORAL 2 TIMES DAILY
Qty: 90 CAPSULE | Refills: 3 | Status: CANCELLED | OUTPATIENT
Start: 2023-05-10

## 2023-05-10 RX ORDER — NAPROXEN 500 MG/1
500 TABLET ORAL 2 TIMES DAILY WITH MEALS
Qty: 180 TABLET | Refills: 3 | Status: SHIPPED | OUTPATIENT
Start: 2023-05-10

## 2023-05-10 RX ORDER — CEPHALEXIN 500 MG/1
500 CAPSULE ORAL 2 TIMES DAILY
Qty: 14 CAPSULE | Refills: 0 | Status: SHIPPED | OUTPATIENT
Start: 2023-05-10

## 2023-05-10 RX ORDER — DICLOFENAC 16.05 MG/ML
SOLUTION TOPICAL
Qty: 100 ML | Refills: 3 | Status: SHIPPED | OUTPATIENT
Start: 2023-05-10

## 2023-05-10 RX ORDER — TRAZODONE HYDROCHLORIDE 150 MG/1
150 TABLET ORAL NIGHTLY PRN
Qty: 90 TABLET | Refills: 3 | Status: SHIPPED | OUTPATIENT
Start: 2023-05-10

## 2023-05-10 RX ORDER — TRIAMCINOLONE ACETONIDE 1 MG/ML
LOTION TOPICAL 3 TIMES DAILY
Qty: 1 EACH | Refills: 1 | Status: CANCELLED | OUTPATIENT
Start: 2023-05-10

## 2023-05-10 RX ORDER — HYDROCHLOROTHIAZIDE 25 MG/1
25 TABLET ORAL DAILY
Qty: 90 TABLET | Refills: 3 | Status: SHIPPED | OUTPATIENT
Start: 2023-05-10

## 2023-05-10 RX ORDER — DULOXETIN HYDROCHLORIDE 60 MG/1
CAPSULE, DELAYED RELEASE ORAL
Qty: 180 CAPSULE | Refills: 3 | Status: CANCELLED | OUTPATIENT
Start: 2023-05-10

## 2023-05-10 SDOH — ECONOMIC STABILITY: FOOD INSECURITY: WITHIN THE PAST 12 MONTHS, YOU WORRIED THAT YOUR FOOD WOULD RUN OUT BEFORE YOU GOT MONEY TO BUY MORE.: NEVER TRUE

## 2023-05-10 SDOH — ECONOMIC STABILITY: FOOD INSECURITY: WITHIN THE PAST 12 MONTHS, THE FOOD YOU BOUGHT JUST DIDN'T LAST AND YOU DIDN'T HAVE MONEY TO GET MORE.: NEVER TRUE

## 2023-05-10 SDOH — ECONOMIC STABILITY: INCOME INSECURITY: HOW HARD IS IT FOR YOU TO PAY FOR THE VERY BASICS LIKE FOOD, HOUSING, MEDICAL CARE, AND HEATING?: NOT HARD AT ALL

## 2023-05-10 ASSESSMENT — ANXIETY QUESTIONNAIRES
7. FEELING AFRAID AS IF SOMETHING AWFUL MIGHT HAPPEN: 0
2. NOT BEING ABLE TO STOP OR CONTROL WORRYING: 0
6. BECOMING EASILY ANNOYED OR IRRITABLE: 0
GAD7 TOTAL SCORE: 0
3. WORRYING TOO MUCH ABOUT DIFFERENT THINGS: 0
5. BEING SO RESTLESS THAT IT IS HARD TO SIT STILL: 0
1. FEELING NERVOUS, ANXIOUS, OR ON EDGE: 0
4. TROUBLE RELAXING: 0
IF YOU CHECKED OFF ANY PROBLEMS ON THIS QUESTIONNAIRE, HOW DIFFICULT HAVE THESE PROBLEMS MADE IT FOR YOU TO DO YOUR WORK, TAKE CARE OF THINGS AT HOME, OR GET ALONG WITH OTHER PEOPLE: NOT DIFFICULT AT ALL

## 2023-05-10 ASSESSMENT — PATIENT HEALTH QUESTIONNAIRE - PHQ9: DEPRESSION UNABLE TO ASSESS: PT REFUSES

## 2023-05-10 NOTE — PROGRESS NOTES
[address] [facility]  Phone: [phone] Fax [phone]  Daly Burt MD  5/10/2023         Mr. Willis Weaver  is a 60y.o.  year old  male patient who comes in to check on several medications. He does take naproxen for his back and neck as well as uses diclofenac oil on his knees and he was sent to his mail order pharmacy. He does have diabetes with neuropathy and has spinal stenosis and chronic back pain due to degenerative disc disease. He has also recently had both hips replaced. 1 was done in February and that went well and then he done 3 weeks ago and he has had more trouble with his hip recovering this time. He is also noticed some redness at the incision site and it was not red last week when he went to his surgeon. He has not had fever. He is also having trouble sleeping and the trazodone 50 mg does not seem to be working quite enough. He does do Cymbalta for neuropathy and pain and he needs that refilled. Mr. Willis Weaver  has  has a past medical history of Arthritis, Benign prostatic hyperplasia, Chronic pain, Diabetes mellitus ([address] Fatty liver, Former smoker, GERD (gastroesophageal reflux disease), History of staph infection, Hypertension, Ill-defined disease, Impaired mobility, Insomnia, Morbid obesity ([address] Other ill-defined conditions(799.89), Other ill-defined conditions(799.89), Other ill-defined conditions(799.89), Peptic ulcer, Peripheral neuropathy, and Unspecified sleep apnea. Mr. Willis Weaver  has  has a past surgical history that includes Appendectomy; sinus surgery; Tonsillectomy and adenoidectomy; Endoscopy, colon, diagnostic; Colonoscopy; Upper gastrointestinal endoscopy (N/A, 08/05/2022); Eye surgery (Left); Varicose vein surgery (Bilateral); and Sleeve Gastrectomy (N/A, 9/12/2022).     Mr. Willis Weaver   Current Outpatient Medications   Medication Sig Dispense Refill    hydroCHLOROthiazide (HYDRODIURIL) 25 MG tablet Take 1 tablet

## 2023-05-26 ENCOUNTER — OFFICE VISIT (OUTPATIENT)
Dept: FAMILY MEDICINE CLINIC | Facility: CLINIC | Age: 58
End: 2023-05-26
Payer: COMMERCIAL

## 2023-05-26 VITALS
BODY MASS INDEX: 41.75 KG/M2 | HEIGHT: 73 IN | RESPIRATION RATE: 16 BRPM | OXYGEN SATURATION: 97 % | SYSTOLIC BLOOD PRESSURE: 140 MMHG | HEART RATE: 92 BPM | WEIGHT: 315 LBS | TEMPERATURE: 96 F | DIASTOLIC BLOOD PRESSURE: 73 MMHG

## 2023-05-26 DIAGNOSIS — G89.29 CHRONIC PAIN OF BOTH KNEES: ICD-10-CM

## 2023-05-26 DIAGNOSIS — M25.562 CHRONIC PAIN OF BOTH KNEES: ICD-10-CM

## 2023-05-26 DIAGNOSIS — M25.561 CHRONIC PAIN OF BOTH KNEES: ICD-10-CM

## 2023-05-26 DIAGNOSIS — J01.00 ACUTE NON-RECURRENT MAXILLARY SINUSITIS: Primary | ICD-10-CM

## 2023-05-26 PROCEDURE — 99213 OFFICE O/P EST LOW 20 MIN: CPT | Performed by: FAMILY MEDICINE

## 2023-05-26 RX ORDER — DICLOFENAC 16.05 MG/ML
SOLUTION TOPICAL
Qty: 100 ML | Refills: 3 | Status: SHIPPED | OUTPATIENT
Start: 2023-05-26 | End: 2023-05-26 | Stop reason: SDUPTHER

## 2023-05-26 RX ORDER — DICLOFENAC 16.05 MG/ML
SOLUTION TOPICAL
Qty: 100 ML | Refills: 11 | Status: SHIPPED | OUTPATIENT
Start: 2023-05-26

## 2023-05-26 RX ORDER — AMOXICILLIN 500 MG/1
500 CAPSULE ORAL 2 TIMES DAILY
Qty: 20 CAPSULE | Refills: 0 | Status: SHIPPED | OUTPATIENT
Start: 2023-05-26 | End: 2023-06-05

## 2023-05-26 NOTE — PROGRESS NOTES
m)   Wt (!) 318 lb (144.2 kg)   SpO2 97%   BMI 41.96 kg/m²     HEENT: Normocephalic, atraumatic, pupils equal and reactive to light and accommodation, tympanic membranes intact without erythema or edema, oropharynx clear with no erythema or tonsillar exudate  Neck :supple, no masses, no thyromegaly, no lymphadenopathy  Lungs: clear to auscultation bilaterally  CV: regular rate and rhythm, without murmurs, rubs, or gallops    No results found for this visit on 05/26/23. Juan Pablo Villarreal was seen today for forms. Diagnoses and all orders for this visit:    Acute non-recurrent maxillary sinusitis  -     amoxicillin (AMOXIL) 500 MG capsule; Take 1 capsule by mouth 2 times daily for 10 days    Chronic pain of both knees  -     Discontinue: diclofenac Sodium 1.5 % SOLN; 40 drops to a knee 4 times a day  -     diclofenac Sodium 1.5 % SOLN; 40 drops to a knee 4 times a day    He did also come in with paperwork from his  for disability determination paperwork. I did tell him that we do not do these extensive disability exams and that we will need to go to a doctor that specifically does that.     Cristy Bullard MD

## 2023-06-23 NOTE — PROGRESS NOTES
Pura Johnson PA-C  Bariatric & Advanced Laparoscopic Surgery & Endoscopy  [address] [place] [address]  Katharina Ordonez  Phone [phone]   Fax [phone]    Date of visit: 2022          Name: Ramiro Armstrong      MRN: [id number]       : 1965       Age: 61 y.o. Sex: male        PCP: Vallorie Frankel, MD     Surgeon: {Attendings:94844}  Procedure: {Surgeries:27719}    HPI:    *** post-op visit after a {Surgeries:24187} was done on ***. He has {GVL WEIGHT:55858} since his last office visit. Weight History Graph       Evaluation of Pre-operative Co-morbid Conditions:    Sleep Apnea ***, uses CPAP- ***   Diabetes ***, insulin dependent- ***   Hypertension ***, number of medications- ***   GERD ***, treatment medication- ***   Hyperlipidemia ***     Clinical Assessment and Physical Exam:    He is doing very well today and is feeling good. He reports that he has been adhering to the [facility] diet without difficulty. He denies any *** abdominal pain, nausea or vomiting or heartburn. He denies fevers or chills, *** or any redness or drainage from the incision sites. He {DOES:} report having problems with constipation. His pain {IS:} well controlled and {IS:} taking pain medications. He {HAS HAS NOT:79535} been taking the PPI without difficulty. He has been doing his protein shakes without difficulty. ***    Protein:  *** grams per day  Fluids:    *** ounces per day  Exercise:  ***  Denies reflux. Denies dysphagia. ***  Tolerating Protein first diet without difficulty.     PMH:  Past Medical History:   Diagnosis Date    Arthritis     osteo - in back, hips    Benign prostatic hyperplasia     Chronic pain     hips, neck, back    Diabetes mellitus ([address]     Controlled type 2 diabetes mellitus without complication, without long-term current use of insulin;  no bs checks at home; no meds; A1c=6.3 on 22    Fatty liver     Former smoker     GERD (gastroesophageal reflux disease)     on med for control    History of staph infection 1996    appendix ruptured    Hypertension     on med for control    Ill-defined disease     swelling of both lower legs- SCIATICA- legs feel like \"burning\"    Impaired mobility     walks with cane    Insomnia     Morbid obesity (HCC)     BMI=51.81    Other ill-defined conditions(799.89)     stuffy nose/ infection    Other ill-defined conditions(799.89)     spinal stenosis    Other ill-defined conditions(799.89)     several kidney stones-passed on own    Peptic ulcer 10/11/2013    Peripheral neuropathy     hands and feet    Unspecified sleep apnea     c-pap nightly; instructed to bring dos       PSH:  Past Surgical History:   Procedure Laterality Date    APPENDECTOMY      COLONOSCOPY      ENDOSCOPY, COLON, DIAGNOSTIC      EYE SURGERY Left     SINUS SURGERY      sinus surgery; nasal polyps removed - Dr. White Sis N/A 9/12/2022    ERAS/ GASTRECTOMY SLEEVE LAPAROSCOPIC - DIFFICULT AIRWAY SUSPECTED [facility] END[facility] N/A 08/05/2022    EGD BIOPSY performed by Spencer Painter MD at [place] [address] Bilateral        MEDS:  Current Outpatient Medications   Medication Sig Dispense Refill    traZODone (DESYREL) 50 MG tablet Take 1 tablet by mouth nightly as needed for Sleep 30 tablet 5    temazepam (RESTORIL) 30 MG capsule       ondansetron (ZOFRAN) 4 MG tablet Take 1 tablet by mouth 3 times daily as needed for Nausea or Vomiting 30 tablet 0    triamcinolone (KENALOG) 0.1 % lotion Apply topically 3 times daily Apply topically 3 times daily. 1 each 1    omeprazole (PRILOSEC) 40 MG delayed release capsule Take 1 capsule by mouth in the morning and 1 capsule before bedtime. 90 capsule 3    hydroCHLOROthiazide (HYDRODIURIL) 25 MG tablet Take 1 tablet by mouth in the morning.  30 tablet 11    DULoxetine visit. General:  Well-developed, well-nourished, no distress. Psych:  Cooperative, good insight and judgement. Neuro:  Alert, oriented to person, place and time. Lungs:  Unlabored breathing. Symmetrical chest expansion. Heart:  Regular rate and rhythm. Abdomen:  Soft, non-tender, non-distended. No guarding or rebound. Lap incisions are healing well. ***    Labs:  *** labs reviewed today. Imaging: *** imaging reviewed today. {No diagnosis found. (Refresh or delete this SmartLink)}    Assessment/Plan:    Gaye Hillman is a 61 y.o. male here to follow up s/p {Surgeries:20477}    He is doing well without any major concerns. ***  He is adhering to the diet and we discussed and addressed all his questions. He will advance to a {EDPBQVJ:81486} diet. I encouraged him to continue physical activity and aim for 300 minutes a week and include 2 strength session a week to maintain the weight loss. He will continue recommended vitamin/mineral supplementation and PPI until the prescription is complete. Return to the office in {NUMBERS; 0-10:5044} {days/wks/mos/yrs:626418} with {FUSUR}. Dana Ochoa PA-C  2022    Counseling time:{counseling time:93183}: I spent this time preparing to see patient (including chart review and preparation), obtaining and/or reviewing additional medical history, performing a physical exam and evaluation, documenting clinical information in the electronic health record, independently interpreting results, communicating results to patient, family or caregiver, and/or coordinating care. show

## 2023-08-14 ENCOUNTER — NURSE ONLY (OUTPATIENT)
Dept: FAMILY MEDICINE CLINIC | Facility: CLINIC | Age: 58
End: 2023-08-14

## 2023-08-14 DIAGNOSIS — E11.9 CONTROLLED TYPE 2 DIABETES MELLITUS WITHOUT COMPLICATION, WITHOUT LONG-TERM CURRENT USE OF INSULIN (HCC): ICD-10-CM

## 2023-08-14 LAB
ALBUMIN SERPL-MCNC: 3.7 G/DL (ref 3.5–5)
ALBUMIN/GLOB SERPL: 1 (ref 0.4–1.6)
ALP SERPL-CCNC: 104 U/L (ref 50–136)
ALT SERPL-CCNC: 21 U/L (ref 12–65)
ANION GAP SERPL CALC-SCNC: 6 MMOL/L (ref 2–11)
AST SERPL-CCNC: 15 U/L (ref 15–37)
BILIRUB SERPL-MCNC: 0.3 MG/DL (ref 0.2–1.1)
BUN SERPL-MCNC: 16 MG/DL (ref 6–23)
CALCIUM SERPL-MCNC: 8.8 MG/DL (ref 8.3–10.4)
CHLORIDE SERPL-SCNC: 106 MMOL/L (ref 101–110)
CHOLEST SERPL-MCNC: 141 MG/DL
CO2 SERPL-SCNC: 29 MMOL/L (ref 21–32)
CREAT SERPL-MCNC: 0.9 MG/DL (ref 0.8–1.5)
CREAT UR-MCNC: 111 MG/DL
GLOBULIN SER CALC-MCNC: 3.6 G/DL (ref 2.8–4.5)
GLUCOSE SERPL-MCNC: 143 MG/DL (ref 65–100)
HDLC SERPL-MCNC: 33 MG/DL (ref 40–60)
HDLC SERPL: 4.3
LDLC SERPL CALC-MCNC: 80.2 MG/DL
MICROALBUMIN UR-MCNC: 3.78 MG/DL
MICROALBUMIN/CREAT UR-RTO: 34 MG/G (ref 0–30)
POTASSIUM SERPL-SCNC: 3.9 MMOL/L (ref 3.5–5.1)
PROT SERPL-MCNC: 7.3 G/DL (ref 6.3–8.2)
SODIUM SERPL-SCNC: 141 MMOL/L (ref 133–143)
TRIGL SERPL-MCNC: 139 MG/DL (ref 35–150)
VLDLC SERPL CALC-MCNC: 27.8 MG/DL (ref 6–23)

## 2023-08-16 LAB
EST. AVERAGE GLUCOSE BLD GHB EST-MCNC: 126 MG/DL
HBA1C MFR BLD: 6 % (ref 4.8–5.6)

## 2023-08-21 ENCOUNTER — TELEPHONE (OUTPATIENT)
Dept: FAMILY MEDICINE CLINIC | Facility: CLINIC | Age: 58
End: 2023-08-21

## 2023-08-21 NOTE — TELEPHONE ENCOUNTER
Pt called front asking when his appointment was and then got ugly about being told it was today at [address] was demanding to be worked in, they transferred him to me. I asked him what he needed to be seen for and he stated his regular appointment plus a head cold and both his big toes are infected and crusted over. I told him you didn't have an appointment open until September and he cut me off and stated he can not wait that long. I offered him an appointment with Maurine Crigler and he said he will not see anyone but Dr. Sukhdeep Christiansen. And then asked me if I didn't just hear his issues. Told me if we are that busy and cant work him in he just needs to find another doctor. I told him I would send you a message asking if he could be worked in and give him a call back.

## 2023-08-21 NOTE — TELEPHONE ENCOUNTER
I cannot just work him in with all those problems - please work to have him see Missie Skiff if he needs to be seen this week. Could see him 9/6 at 10:45.

## 2023-08-21 NOTE — TELEPHONE ENCOUNTER
Patient called back about a work in appointment. Explained that the first available appointment with Dr. Philippe Talamantes was 09/13/2023. He again demanded to be worked in. I explained that Dr. Philippe Talamantes could not work him in. Offered an appointment with Meño Yousif and patient declined. Scheduled appointment with Dr. Philippe Talamantes for 09/13/2023.

## 2023-08-25 RX ORDER — MIRTAZAPINE 30 MG/1
TABLET, FILM COATED ORAL
Qty: 30 TABLET | Refills: 0 | Status: SHIPPED | OUTPATIENT
Start: 2023-08-25

## 2023-09-12 ENCOUNTER — OFFICE VISIT (OUTPATIENT)
Dept: SURGERY | Age: 58
End: 2023-09-12
Payer: COMMERCIAL

## 2023-09-12 VITALS
HEIGHT: 73 IN | WEIGHT: 315 LBS | DIASTOLIC BLOOD PRESSURE: 63 MMHG | BODY MASS INDEX: 41.75 KG/M2 | HEART RATE: 87 BPM | SYSTOLIC BLOOD PRESSURE: 109 MMHG

## 2023-09-12 DIAGNOSIS — Z98.84 S/P LAPAROSCOPIC SLEEVE GASTRECTOMY: ICD-10-CM

## 2023-09-12 DIAGNOSIS — Z71.3 DIETARY COUNSELING: ICD-10-CM

## 2023-09-12 DIAGNOSIS — Z71.82 EXERCISE COUNSELING: ICD-10-CM

## 2023-09-12 DIAGNOSIS — E66.01 MORBID OBESITY (HCC): Primary | ICD-10-CM

## 2023-09-12 DIAGNOSIS — E66.01 OBESITY, CLASS III, BMI 40-49.9 (MORBID OBESITY) (HCC): ICD-10-CM

## 2023-09-12 PROCEDURE — 99213 OFFICE O/P EST LOW 20 MIN: CPT | Performed by: SURGERY

## 2023-09-12 PROCEDURE — APPSS30 APP SPLIT SHARED TIME 16-30 MINUTES: Performed by: PHYSICIAN ASSISTANT

## 2023-09-13 ENCOUNTER — OFFICE VISIT (OUTPATIENT)
Dept: FAMILY MEDICINE CLINIC | Facility: CLINIC | Age: 58
End: 2023-09-13
Payer: COMMERCIAL

## 2023-09-13 VITALS
WEIGHT: 300 LBS | TEMPERATURE: 97.2 F | DIASTOLIC BLOOD PRESSURE: 65 MMHG | SYSTOLIC BLOOD PRESSURE: 114 MMHG | HEART RATE: 100 BPM | HEIGHT: 73 IN | RESPIRATION RATE: 16 BRPM | BODY MASS INDEX: 39.76 KG/M2 | OXYGEN SATURATION: 97 %

## 2023-09-13 DIAGNOSIS — K21.9 GASTROESOPHAGEAL REFLUX DISEASE WITHOUT ESOPHAGITIS: ICD-10-CM

## 2023-09-13 DIAGNOSIS — Z87.11 PERSONAL HISTORY OF PEPTIC ULCER DISEASE: ICD-10-CM

## 2023-09-13 DIAGNOSIS — F33.1 MODERATE EPISODE OF RECURRENT MAJOR DEPRESSIVE DISORDER (HCC): ICD-10-CM

## 2023-09-13 DIAGNOSIS — E11.9 CONTROLLED TYPE 2 DIABETES MELLITUS WITHOUT COMPLICATION, WITHOUT LONG-TERM CURRENT USE OF INSULIN (HCC): ICD-10-CM

## 2023-09-13 DIAGNOSIS — E11.40 TYPE 2 DIABETES MELLITUS WITH DIABETIC NEUROPATHY, WITHOUT LONG-TERM CURRENT USE OF INSULIN (HCC): Primary | ICD-10-CM

## 2023-09-13 DIAGNOSIS — F51.01 PRIMARY INSOMNIA: ICD-10-CM

## 2023-09-13 DIAGNOSIS — L30.9 DERMATITIS: ICD-10-CM

## 2023-09-13 DIAGNOSIS — K27.9 PEPTIC ULCER: ICD-10-CM

## 2023-09-13 DIAGNOSIS — M51.36 DDD (DEGENERATIVE DISC DISEASE), LUMBAR: ICD-10-CM

## 2023-09-13 DIAGNOSIS — F41.9 ANXIETY: ICD-10-CM

## 2023-09-13 DIAGNOSIS — M19.90 ARTHRITIS: ICD-10-CM

## 2023-09-13 PROBLEM — E66.813 CLASS 3 SEVERE OBESITY DUE TO EXCESS CALORIES WITH SERIOUS COMORBIDITY AND BODY MASS INDEX (BMI) OF 40.0 TO 44.9 IN ADULT: Status: RESOLVED | Noted: 2022-09-12 | Resolved: 2023-09-13

## 2023-09-13 PROBLEM — E66.01 CLASS 3 SEVERE OBESITY DUE TO EXCESS CALORIES WITH SERIOUS COMORBIDITY AND BODY MASS INDEX (BMI) OF 40.0 TO 44.9 IN ADULT (HCC): Status: RESOLVED | Noted: 2022-09-12 | Resolved: 2023-09-13

## 2023-09-13 PROCEDURE — 3044F HG A1C LEVEL LT 7.0%: CPT | Performed by: FAMILY MEDICINE

## 2023-09-13 PROCEDURE — 99214 OFFICE O/P EST MOD 30 MIN: CPT | Performed by: FAMILY MEDICINE

## 2023-09-13 RX ORDER — TRIAMCINOLONE ACETONIDE 1 MG/G
CREAM TOPICAL
Qty: 60 G | Refills: 5 | Status: SHIPPED | OUTPATIENT
Start: 2023-09-13

## 2023-09-13 RX ORDER — TRIAMCINOLONE ACETONIDE 1 MG/G
CREAM TOPICAL
COMMUNITY
Start: 2023-08-22 | End: 2023-09-13 | Stop reason: SDUPTHER

## 2023-09-13 RX ORDER — HYDROCORTISONE ACETATE 25 MG/1
25 SUPPOSITORY RECTAL EVERY 12 HOURS
Qty: 20 SUPPOSITORY | Refills: 11 | Status: CANCELLED | OUTPATIENT
Start: 2023-09-13

## 2023-09-13 RX ORDER — MELOXICAM 7.5 MG/1
7.5 TABLET ORAL 2 TIMES DAILY
Qty: 180 TABLET | Refills: 3 | Status: SHIPPED | OUTPATIENT
Start: 2023-09-13

## 2023-09-13 RX ORDER — MIRTAZAPINE 30 MG/1
30 TABLET, FILM COATED ORAL NIGHTLY
Qty: 90 TABLET | Refills: 3 | Status: SHIPPED | OUTPATIENT
Start: 2023-09-13

## 2023-09-13 RX ORDER — OMEPRAZOLE 40 MG/1
40 CAPSULE, DELAYED RELEASE ORAL 2 TIMES DAILY
Qty: 90 CAPSULE | Refills: 3 | Status: SHIPPED | OUTPATIENT
Start: 2023-09-13

## 2023-09-13 RX ORDER — DULOXETIN HYDROCHLORIDE 60 MG/1
CAPSULE, DELAYED RELEASE ORAL
Qty: 270 CAPSULE | Refills: 3 | Status: SHIPPED | OUTPATIENT
Start: 2023-09-13

## 2023-09-13 RX ORDER — TRAZODONE HYDROCHLORIDE 150 MG/1
150 TABLET ORAL NIGHTLY PRN
Qty: 90 TABLET | Refills: 3 | Status: SHIPPED | OUTPATIENT
Start: 2023-09-13

## 2023-09-13 ASSESSMENT — PATIENT HEALTH QUESTIONNAIRE - PHQ9
1. LITTLE INTEREST OR PLEASURE IN DOING THINGS: 0
SUM OF ALL RESPONSES TO PHQ9 QUESTIONS 1 & 2: 0
SUM OF ALL RESPONSES TO PHQ QUESTIONS 1-9: 2
1. LITTLE INTEREST OR PLEASURE IN DOING THINGS: 0
9. THOUGHTS THAT YOU WOULD BE BETTER OFF DEAD, OR OF HURTING YOURSELF: 0
4. FEELING TIRED OR HAVING LITTLE ENERGY: 1
SUM OF ALL RESPONSES TO PHQ QUESTIONS 1-9: 2
SUM OF ALL RESPONSES TO PHQ QUESTIONS 1-9: 0
5. POOR APPETITE OR OVEREATING: 0
6. FEELING BAD ABOUT YOURSELF - OR THAT YOU ARE A FAILURE OR HAVE LET YOURSELF OR YOUR FAMILY DOWN: 0
SUM OF ALL RESPONSES TO PHQ QUESTIONS 1-9: 2
3. TROUBLE FALLING OR STAYING ASLEEP: 1
8. MOVING OR SPEAKING SO SLOWLY THAT OTHER PEOPLE COULD HAVE NOTICED. OR THE OPPOSITE, BEING SO FIGETY OR RESTLESS THAT YOU HAVE BEEN MOVING AROUND A LOT MORE THAN USUAL: 0
SUM OF ALL RESPONSES TO PHQ QUESTIONS 1-9: 2
2. FEELING DOWN, DEPRESSED OR HOPELESS: 0
SUM OF ALL RESPONSES TO PHQ QUESTIONS 1-9: 0
10. IF YOU CHECKED OFF ANY PROBLEMS, HOW DIFFICULT HAVE THESE PROBLEMS MADE IT FOR YOU TO DO YOUR WORK, TAKE CARE OF THINGS AT HOME, OR GET ALONG WITH OTHER PEOPLE: 1
2. FEELING DOWN, DEPRESSED OR HOPELESS: 0
SUM OF ALL RESPONSES TO PHQ9 QUESTIONS 1 & 2: 0
SUM OF ALL RESPONSES TO PHQ QUESTIONS 1-9: 0
7. TROUBLE CONCENTRATING ON THINGS, SUCH AS READING THE NEWSPAPER OR WATCHING TELEVISION: 0
SUM OF ALL RESPONSES TO PHQ QUESTIONS 1-9: 0

## 2023-09-20 ENCOUNTER — TELEPHONE (OUTPATIENT)
Dept: FAMILY MEDICINE CLINIC | Facility: CLINIC | Age: 58
End: 2023-09-20

## 2023-09-20 NOTE — TELEPHONE ENCOUNTER
Ana Krause with [facility] called needing clarification on the Cymbalta. Recommended is 120 mg daily. Rx is written for 180 mg daily. Asking if this is correct.  Please call back with Ref Number [id number]

## 2023-10-23 ENCOUNTER — TELEPHONE (OUTPATIENT)
Dept: FAMILY MEDICINE CLINIC | Facility: CLINIC | Age: 58
End: 2023-10-23

## 2023-10-23 NOTE — TELEPHONE ENCOUNTER
Al Saldivar from [facility] calling about duplicate therapy alert on medication naproxen and mobic. Please call her back at [phone]. Invoice # [id number]. Stating these are in the same drug class and has questions.

## 2023-10-24 NOTE — TELEPHONE ENCOUNTER
Spoke with pharmacist called asking if pt is suppose to be taking both Meloxicam and Naproxen. According to chart meloxicam took place of the naproxen. Pharmacist informed.

## 2023-11-14 ENCOUNTER — OFFICE VISIT (OUTPATIENT)
Dept: FAMILY MEDICINE CLINIC | Facility: CLINIC | Age: 58
End: 2023-11-14
Payer: COMMERCIAL

## 2023-11-14 VITALS
WEIGHT: 310.7 LBS | BODY MASS INDEX: 41.18 KG/M2 | HEART RATE: 84 BPM | TEMPERATURE: 97.7 F | SYSTOLIC BLOOD PRESSURE: 131 MMHG | OXYGEN SATURATION: 98 % | HEIGHT: 73 IN | DIASTOLIC BLOOD PRESSURE: 70 MMHG | RESPIRATION RATE: 17 BRPM

## 2023-11-14 DIAGNOSIS — J33.9 NASAL POLYPS: ICD-10-CM

## 2023-11-14 DIAGNOSIS — J32.9 CHRONIC RECURRENT SINUSITIS: Primary | ICD-10-CM

## 2023-11-14 PROCEDURE — 99213 OFFICE O/P EST LOW 20 MIN: CPT | Performed by: PHYSICIAN ASSISTANT

## 2023-11-14 ASSESSMENT — ENCOUNTER SYMPTOMS
SHORTNESS OF BREATH: 0
SINUS PRESSURE: 1
COUGH: 0
SORE THROAT: 0
SINUS PAIN: 1
WHEEZING: 0
RHINORRHEA: 1

## 2023-11-14 NOTE — PROGRESS NOTES
Patient's questions are answered and we will follow up as indicated. Dictated using voice recognition software. Proof read but unrecognized errors may exist.    Follow-up and Dispositions    Return as previously scheduled, 3/2024, with .          Jennifer Willis PA-C

## 2023-11-14 NOTE — PATIENT INSTRUCTIONS
*A referral has been placed to ENT (Melvi Mcdaniel). They should contact you in the next 7-10 days to schedule. Please let us know if you do not hear from them.

## 2024-01-08 ENCOUNTER — OFFICE VISIT (OUTPATIENT)
Dept: FAMILY MEDICINE CLINIC | Facility: CLINIC | Age: 59
End: 2024-01-08
Payer: COMMERCIAL

## 2024-01-08 VITALS
WEIGHT: 302.8 LBS | HEIGHT: 73 IN | HEART RATE: 94 BPM | RESPIRATION RATE: 17 BRPM | OXYGEN SATURATION: 98 % | SYSTOLIC BLOOD PRESSURE: 126 MMHG | BODY MASS INDEX: 40.13 KG/M2 | DIASTOLIC BLOOD PRESSURE: 61 MMHG | TEMPERATURE: 97.7 F

## 2024-01-08 DIAGNOSIS — E11.40 TYPE 2 DIABETES MELLITUS WITH DIABETIC NEUROPATHY, WITHOUT LONG-TERM CURRENT USE OF INSULIN (HCC): ICD-10-CM

## 2024-01-08 DIAGNOSIS — M48.00 SPINAL STENOSIS, UNSPECIFIED SPINAL REGION: Primary | ICD-10-CM

## 2024-01-08 DIAGNOSIS — I10 ESSENTIAL HYPERTENSION: ICD-10-CM

## 2024-01-08 DIAGNOSIS — N52.9 ERECTILE DYSFUNCTION, UNSPECIFIED ERECTILE DYSFUNCTION TYPE: ICD-10-CM

## 2024-01-08 PROCEDURE — 3078F DIAST BP <80 MM HG: CPT | Performed by: PHYSICIAN ASSISTANT

## 2024-01-08 PROCEDURE — 99214 OFFICE O/P EST MOD 30 MIN: CPT | Performed by: PHYSICIAN ASSISTANT

## 2024-01-08 PROCEDURE — 3074F SYST BP LT 130 MM HG: CPT | Performed by: PHYSICIAN ASSISTANT

## 2024-01-08 RX ORDER — SILDENAFIL 50 MG/1
50 TABLET, FILM COATED ORAL PRN
Qty: 10 TABLET | Refills: 3 | Status: SHIPPED | OUTPATIENT
Start: 2024-01-08

## 2024-01-08 ASSESSMENT — PATIENT HEALTH QUESTIONNAIRE - PHQ9
5. POOR APPETITE OR OVEREATING: 0
SUM OF ALL RESPONSES TO PHQ QUESTIONS 1-9: 0
SUM OF ALL RESPONSES TO PHQ QUESTIONS 1-9: 0
4. FEELING TIRED OR HAVING LITTLE ENERGY: 0
8. MOVING OR SPEAKING SO SLOWLY THAT OTHER PEOPLE COULD HAVE NOTICED. OR THE OPPOSITE, BEING SO FIGETY OR RESTLESS THAT YOU HAVE BEEN MOVING AROUND A LOT MORE THAN USUAL: 0
7. TROUBLE CONCENTRATING ON THINGS, SUCH AS READING THE NEWSPAPER OR WATCHING TELEVISION: 0
SUM OF ALL RESPONSES TO PHQ QUESTIONS 1-9: 0
2. FEELING DOWN, DEPRESSED OR HOPELESS: 0
9. THOUGHTS THAT YOU WOULD BE BETTER OFF DEAD, OR OF HURTING YOURSELF: 0
SUM OF ALL RESPONSES TO PHQ9 QUESTIONS 1 & 2: 0
1. LITTLE INTEREST OR PLEASURE IN DOING THINGS: 0
3. TROUBLE FALLING OR STAYING ASLEEP: 0
10. IF YOU CHECKED OFF ANY PROBLEMS, HOW DIFFICULT HAVE THESE PROBLEMS MADE IT FOR YOU TO DO YOUR WORK, TAKE CARE OF THINGS AT HOME, OR GET ALONG WITH OTHER PEOPLE: 0
SUM OF ALL RESPONSES TO PHQ QUESTIONS 1-9: 0
6. FEELING BAD ABOUT YOURSELF - OR THAT YOU ARE A FAILURE OR HAVE LET YOURSELF OR YOUR FAMILY DOWN: 0

## 2024-01-08 ASSESSMENT — ENCOUNTER SYMPTOMS
SHORTNESS OF BREATH: 0
BACK PAIN: 1

## 2024-01-08 NOTE — PROGRESS NOTES
(5/10/2023)    Housing Stability Vital Sign     Unable to Pay for Housing in the Last Year: Not on file     Number of Places Lived in the Last Year: Not on file     Unstable Housing in the Last Year: No         ROS  Review of Systems   Constitutional:  Negative for chills and fever.   HENT: Negative.     Respiratory:  Negative for shortness of breath.    Cardiovascular:  Negative for chest pain.   Musculoskeletal:  Positive for back pain.     /61   Pulse 94   Temp 97.7 °F (36.5 °C)   Resp 17   Ht 1.854 m (6' 1\")   Wt (!) 137.3 kg (302 lb 12.8 oz)   SpO2 98%   BMI 39.95 kg/m²   Body mass index is 39.95 kg/m².     Physical Exam    Physical Exam  Vitals and nursing note reviewed.   Constitutional:       Appearance: Normal appearance. He is not ill-appearing.   HENT:      Head: Normocephalic.   Cardiovascular:      Rate and Rhythm: Normal rate and regular rhythm.      Heart sounds: Normal heart sounds. No murmur heard.  Pulmonary:      Effort: Pulmonary effort is normal.      Breath sounds: Normal breath sounds.   Neurological:      Mental Status: He is alert.   Psychiatric:         Mood and Affect: Mood normal.         Behavior: Behavior normal.         Thought Content: Thought content normal.       ASSESSMENT & PLAN    ICD-10-CM    1. Spinal stenosis, unspecified spinal region  M48.00       2. Erectile dysfunction, unspecified erectile dysfunction type  N52.9 sildenafil (VIAGRA) 50 MG tablet      3. Type 2 diabetes mellitus with diabetic neuropathy, without long-term current use of insulin (HCC)  E11.40       4. Essential hypertension  I10            1. Spinal stenosis, unspecified spinal region  *Currently following with an orthopedic spine specialist through [facility].  Planning for surgery next month.  Patient has some questions/concerns regarding the surgery but notes that he has a follow-up scheduled with the spine specialist prior to his scheduled surgery date.  He plans to ask all questions at that

## 2024-01-08 NOTE — PATIENT INSTRUCTIONS
*Try the Viagra (Sildenafil) - Take one 50 mg tablet 1-4 hours prior to intercourse. Max is one dose per 24 hours. If it doesn't work after two attempts, increase to 100 mg (2 tabs). Let us know which dose works best for you.

## 2024-01-21 ENCOUNTER — TELEPHONE (OUTPATIENT)
Dept: FAMILY MEDICINE CLINIC | Facility: CLINIC | Age: 59
End: 2024-01-21

## 2024-01-23 ENCOUNTER — NURSE ONLY (OUTPATIENT)
Dept: UROLOGY | Age: 59
End: 2024-01-23

## 2024-01-23 DIAGNOSIS — N40.1 BPH WITH OBSTRUCTION/LOWER URINARY TRACT SYMPTOMS: Primary | ICD-10-CM

## 2024-01-23 DIAGNOSIS — N13.8 BPH WITH OBSTRUCTION/LOWER URINARY TRACT SYMPTOMS: Primary | ICD-10-CM

## 2024-01-23 LAB — PSA SERPL-MCNC: 0.2 NG/ML

## 2024-01-30 ENCOUNTER — OFFICE VISIT (OUTPATIENT)
Dept: UROLOGY | Age: 59
End: 2024-01-30
Payer: COMMERCIAL

## 2024-01-30 DIAGNOSIS — N13.8 BPH WITH OBSTRUCTION/LOWER URINARY TRACT SYMPTOMS: Primary | ICD-10-CM

## 2024-01-30 DIAGNOSIS — N41.1 CHRONIC PROSTATITIS: ICD-10-CM

## 2024-01-30 DIAGNOSIS — N40.1 BPH WITH OBSTRUCTION/LOWER URINARY TRACT SYMPTOMS: Primary | ICD-10-CM

## 2024-01-30 LAB
BILIRUBIN, URINE, POC: NEGATIVE
BLOOD URINE, POC: NEGATIVE
GLUCOSE URINE, POC: NEGATIVE
KETONES, URINE, POC: NEGATIVE
LEUKOCYTE ESTERASE, URINE, POC: NEGATIVE
NITRITE, URINE, POC: NEGATIVE
PH, URINE, POC: 5.5 (ref 4.6–8)
PROTEIN,URINE, POC: NEGATIVE
SPECIFIC GRAVITY, URINE, POC: 1.03 (ref 1–1.03)
URINALYSIS CLARITY, POC: NORMAL
URINALYSIS COLOR, POC: NORMAL
UROBILINOGEN, POC: NORMAL

## 2024-01-30 PROCEDURE — 99214 OFFICE O/P EST MOD 30 MIN: CPT | Performed by: NURSE PRACTITIONER

## 2024-01-30 PROCEDURE — 81003 URINALYSIS AUTO W/O SCOPE: CPT | Performed by: NURSE PRACTITIONER

## 2024-01-30 RX ORDER — CIPROFLOXACIN 500 MG/1
500 TABLET, FILM COATED ORAL 2 TIMES DAILY
Qty: 20 TABLET | Refills: 0 | Status: SHIPPED | OUTPATIENT
Start: 2024-01-30 | End: 2024-02-09

## 2024-01-30 RX ORDER — TAMSULOSIN HYDROCHLORIDE 0.4 MG/1
0.4 CAPSULE ORAL DAILY
Qty: 90 CAPSULE | Refills: 3 | Status: SHIPPED | OUTPATIENT
Start: 2024-01-30

## 2024-01-30 ASSESSMENT — ENCOUNTER SYMPTOMS
BACK PAIN: 0
NAUSEA: 0

## 2024-01-30 NOTE — PROGRESS NOTES
[facility]  [address]  [place] [address]  [phone]          Mustapha Naranjo  : 1965    Chief Complaint   Patient presents with    Follow-up          HPI     Mustapha Naranjo is a 58 y.o. male returns in follow up for chronic prostatitis. Pt reports two flares in past yr self treated with Cipro. Previously on IM testosterone that he stopped due to LE swelling.  PSA remains stable at 0.2 on 24.   Cont to struggle with lower back issues and requires narcotics for pain.  Reports improved hesitancy on Flomax.  Occ nocturia.  Reports history of stones.  Declined hematuria at prior visit.  Denies any flank pain or recurrent hematuria.  Prior abd ultrasound showed a small L renal stone.     Has upcoming back and rotator cuff surgeries this year.      Past Medical History:   Diagnosis Date    Arthritis     osteo - in back, hips    Benign prostatic hyperplasia     Chronic pain     hips, neck, back    Diabetes mellitus (HCC)     Controlled type 2 diabetes mellitus without complication, without long-term current use of insulin;  no bs checks at home; no meds; A1c=6.3 on 22    Fatty liver     Former smoker     GERD (gastroesophageal reflux disease)     on med for control    History of staph infection     appendix ruptured    Hypertension     on med for control    Ill-defined disease     swelling of both lower legs- SCIATICA- legs feel like \"burning\"    Impaired mobility     walks with cane    Insomnia     Morbid obesity (HCC)     BMI=51.81    Other ill-defined conditions(799.89)     stuffy nose/ infection    Other ill-defined conditions(799.89)     spinal stenosis    Other ill-defined conditions(799.89)     several kidney stones-passed on own    Peptic ulcer 10/11/2013    Peripheral neuropathy     hands and feet    Unspecified sleep apnea     c-pap nightly; instructed to bring dos     Past Surgical History:   Procedure Laterality Date    APPENDECTOMY

## 2024-02-24 DIAGNOSIS — K21.9 GASTROESOPHAGEAL REFLUX DISEASE WITHOUT ESOPHAGITIS: ICD-10-CM

## 2024-03-04 RX ORDER — OMEPRAZOLE 40 MG/1
40 CAPSULE, DELAYED RELEASE ORAL 2 TIMES DAILY
Qty: 90 CAPSULE | Refills: 7 | OUTPATIENT
Start: 2024-03-04

## 2024-04-04 ENCOUNTER — TELEPHONE (OUTPATIENT)
Dept: FAMILY MEDICINE CLINIC | Facility: CLINIC | Age: 59
End: 2024-04-04

## 2024-04-04 NOTE — TELEPHONE ENCOUNTER
D/C DATE: 04/04/2024    HOSPITAL PT D/C'd FROM: [facility]     REHAB FACILITY PT D/C'd FROM: Sa[facility]l Rinaldi      D/TANA TO: Home     PHONE NUMBER TO REACH PATIENT: [phone]      F/U APPT DATE & TIME: 04/15/2024 @ 3:15

## 2024-04-04 NOTE — TELEPHONE ENCOUNTER
Care Transitions Initial Follow Up Call    Outreach made within 2 business days of discharge: Yes    Patient: Mustapha Naranjo Patient : 1965   MRN: [id number]  Reason for Admission: There are no discharge diagnoses documented for the most recent discharge.  Discharge Date: 22       Spoke with: Patient    Discharge department/facility: [facility]     [facility] Interactive Patient Contact:  Was patient able to fill all prescriptions: Yes  Was patient instructed to bring all medications to the follow-up visit: Yes  Is patient taking all medications as directed in the discharge summary? Yes  Does patient understand their discharge instructions: Yes  Does patient have questions or concerns that need addressed prior to 7-14 day follow up office visit: no    Scheduled appointment with PCP within 7-14 days    Follow Up  Future Appointments   Date Time Provider Department Center   4/15/2024  3:15 PM Elise Ge MD FPA [facility] AM[facility]   9/10/2024 10:10 AM Quintin Curry MD CSAE [facility] AMB   2025 11:15 AM [id number] BLOOD DRAW [id number] [facility]   2025  1:15 PM Lisa Isaac, APRN - CNP [id number] [facility]       Radha Zaman MA

## 2024-04-15 ENCOUNTER — OFFICE VISIT (OUTPATIENT)
Dept: FAMILY MEDICINE CLINIC | Facility: CLINIC | Age: 59
End: 2024-04-15
Payer: COMMERCIAL

## 2024-04-15 VITALS
HEIGHT: 73 IN | RESPIRATION RATE: 16 BRPM | BODY MASS INDEX: 39.95 KG/M2 | OXYGEN SATURATION: 98 % | DIASTOLIC BLOOD PRESSURE: 76 MMHG | HEART RATE: 115 BPM | SYSTOLIC BLOOD PRESSURE: 113 MMHG | TEMPERATURE: 97.5 F

## 2024-04-15 DIAGNOSIS — M25.561 CHRONIC PAIN OF BOTH KNEES: ICD-10-CM

## 2024-04-15 DIAGNOSIS — F41.9 ANXIETY: ICD-10-CM

## 2024-04-15 DIAGNOSIS — E66.01 SEVERE OBESITY (BMI 35.0-39.9) WITH COMORBIDITY (HCC): ICD-10-CM

## 2024-04-15 DIAGNOSIS — M48.061 SPINAL STENOSIS OF LUMBAR REGION WITHOUT NEUROGENIC CLAUDICATION: ICD-10-CM

## 2024-04-15 DIAGNOSIS — E11.9 CONTROLLED TYPE 2 DIABETES MELLITUS WITHOUT COMPLICATION, WITHOUT LONG-TERM CURRENT USE OF INSULIN (HCC): ICD-10-CM

## 2024-04-15 DIAGNOSIS — E11.40 TYPE 2 DIABETES MELLITUS WITH DIABETIC NEUROPATHY, WITHOUT LONG-TERM CURRENT USE OF INSULIN (HCC): ICD-10-CM

## 2024-04-15 DIAGNOSIS — E11.9 CONTROLLED TYPE 2 DIABETES MELLITUS WITHOUT COMPLICATION, WITHOUT LONG-TERM CURRENT USE OF INSULIN (HCC): Primary | ICD-10-CM

## 2024-04-15 DIAGNOSIS — D50.8 OTHER IRON DEFICIENCY ANEMIA: ICD-10-CM

## 2024-04-15 DIAGNOSIS — M25.562 CHRONIC PAIN OF BOTH KNEES: ICD-10-CM

## 2024-04-15 DIAGNOSIS — K21.9 GASTROESOPHAGEAL REFLUX DISEASE WITHOUT ESOPHAGITIS: ICD-10-CM

## 2024-04-15 DIAGNOSIS — M19.90 ARTHRITIS: ICD-10-CM

## 2024-04-15 DIAGNOSIS — M51.36 DDD (DEGENERATIVE DISC DISEASE), LUMBAR: ICD-10-CM

## 2024-04-15 DIAGNOSIS — I26.99 OTHER ACUTE PULMONARY EMBOLISM WITHOUT ACUTE COR PULMONALE (HCC): ICD-10-CM

## 2024-04-15 DIAGNOSIS — F33.1 MODERATE EPISODE OF RECURRENT MAJOR DEPRESSIVE DISORDER (HCC): ICD-10-CM

## 2024-04-15 DIAGNOSIS — G89.29 CHRONIC PAIN OF BOTH KNEES: ICD-10-CM

## 2024-04-15 LAB
EST. AVERAGE GLUCOSE BLD GHB EST-MCNC: 105 MG/DL
HBA1C MFR BLD: 5.3 % (ref 4.8–5.6)

## 2024-04-15 PROCEDURE — 99214 OFFICE O/P EST MOD 30 MIN: CPT | Performed by: FAMILY MEDICINE

## 2024-04-15 RX ORDER — LANOLIN ALCOHOL/MO/W.PET/CERES
CREAM (GRAM) TOPICAL
Qty: 180 TABLET | Refills: 3 | Status: SHIPPED | OUTPATIENT
Start: 2024-04-15

## 2024-04-15 RX ORDER — TROLAMINE SALICYLATE 10 G/100G
CREAM TOPICAL 4 TIMES DAILY PRN
COMMUNITY
Start: 2024-04-03 | End: 2024-05-03

## 2024-04-15 RX ORDER — CYCLOBENZAPRINE HCL 5 MG
TABLET ORAL
Qty: 90 TABLET | Refills: 3 | Status: SHIPPED | OUTPATIENT
Start: 2024-04-15

## 2024-04-15 RX ORDER — FOLIC ACID 1 MG/1
1 TABLET ORAL DAILY
Qty: 90 TABLET | Refills: 3 | Status: SHIPPED | OUTPATIENT
Start: 2024-04-15

## 2024-04-15 RX ORDER — POLYETHYLENE GLYCOL 3350 17 G/17G
17 POWDER, FOR SOLUTION ORAL DAILY PRN
COMMUNITY

## 2024-04-15 RX ORDER — FOLIC ACID 1 MG/1
1 TABLET ORAL DAILY
COMMUNITY
End: 2024-04-15 | Stop reason: SDUPTHER

## 2024-04-15 RX ORDER — LANOLIN ALCOHOL/MO/W.PET/CERES
1000 CREAM (GRAM) TOPICAL DAILY
COMMUNITY

## 2024-04-15 RX ORDER — FAMOTIDINE 20 MG/1
20 TABLET, FILM COATED ORAL 2 TIMES DAILY
COMMUNITY
End: 2024-04-15 | Stop reason: SDUPTHER

## 2024-04-15 RX ORDER — OXYCODONE HYDROCHLORIDE 5 MG/1
5 TABLET ORAL EVERY 4 HOURS PRN
COMMUNITY
Start: 2024-04-03 | End: 2024-04-17

## 2024-04-15 RX ORDER — LANOLIN ALCOHOL/MO/W.PET/CERES
325 CREAM (GRAM) TOPICAL
COMMUNITY
End: 2024-04-15 | Stop reason: SDUPTHER

## 2024-04-15 RX ORDER — CYCLOBENZAPRINE HCL 5 MG
5 TABLET ORAL 3 TIMES DAILY PRN
COMMUNITY
Start: 2024-02-15 | End: 2024-04-15 | Stop reason: SDUPTHER

## 2024-04-15 RX ORDER — DULOXETIN HYDROCHLORIDE 60 MG/1
CAPSULE, DELAYED RELEASE ORAL
Qty: 270 CAPSULE | Refills: 3 | Status: SHIPPED | OUTPATIENT
Start: 2024-04-15

## 2024-04-15 RX ORDER — DICLOFENAC 16.05 MG/ML
SOLUTION TOPICAL
Qty: 100 ML | Refills: 11 | Status: SHIPPED | OUTPATIENT
Start: 2024-04-15

## 2024-04-15 RX ORDER — MELOXICAM 7.5 MG/1
7.5 TABLET ORAL 2 TIMES DAILY
Qty: 180 TABLET | Refills: 3 | Status: SHIPPED | OUTPATIENT
Start: 2024-04-15

## 2024-04-15 RX ORDER — PANTOPRAZOLE SODIUM 40 MG/1
40 TABLET, DELAYED RELEASE ORAL 2 TIMES DAILY
COMMUNITY
Start: 2024-04-03 | End: 2024-04-15

## 2024-04-15 RX ORDER — FAMOTIDINE 20 MG/1
20 TABLET, FILM COATED ORAL 2 TIMES DAILY
Qty: 180 TABLET | Refills: 3 | Status: SHIPPED | OUTPATIENT
Start: 2024-04-15

## 2024-04-15 NOTE — PROGRESS NOTES
[facility] [place] [address]  Phone: [phone] Fax [phone]  Elise Ge MD  4/15/2024         Mr. Naranjo  is a 59 y.o.  year old  male patient who comes in to check on diabetes, hypertension, and high cholesterol.  He has just gotten out of physical rehab after having a back surgery that failed and then having to go back and have it redone.  He did have a pulmonary embolus occurred as a complication of that surgery.  [facility] then went to [facility] after that.  The surgery did render him weak in his lower legs and in fact he cannot feel anything from his left knee down and cannot walk without a walker.  He is hopeful that rehab will help him recover some of his use of his legs.    He does not usually check his sugars as they have been well-controlled.    Sugars have been running unknown.  He has been controlling it with diet and has done well.  He did have a gastric bypass and continues to work on weight loss.   Last eye exam was last year. Feet have neuropathy problems.     . Did have a pneumonia shot unknown. Did have a tetanus shot unknown. Blood pressure has been under good control.        They did change several of his medicines.         He does do Cymbalta for neuropathy and pain and depression and anxiety.  He was wondering what he needs to continue.    He does have follow-up with the lung doctor as well as with his orthopedic surgeon.    He did have to have blood while he was in the hospital and is on iron tablets now.    He is doing fine with the Pepcid for his reflux and does not think he needs a Prilosec or Protonix at this time.  Mr. Naranjo  has  has a past medical history of Arthritis, Benign prostatic hyperplasia, Chronic pain, Diabetes mellitus (HCC), Fatty liver, Former smoker, GERD (gastroesophageal reflux disease), History of staph infection, Hypertension, Ill-defined disease, Impaired mobility, Insomnia, Morbid obesity (HCC), Other

## 2024-04-23 ENCOUNTER — PATIENT MESSAGE (OUTPATIENT)
Dept: FAMILY MEDICINE CLINIC | Facility: CLINIC | Age: 59
End: 2024-04-23

## 2024-04-23 DIAGNOSIS — K21.9 GASTROESOPHAGEAL REFLUX DISEASE WITHOUT ESOPHAGITIS: ICD-10-CM

## 2024-04-23 RX ORDER — OMEPRAZOLE 40 MG/1
40 CAPSULE, DELAYED RELEASE ORAL 2 TIMES DAILY
Qty: 90 CAPSULE | Refills: 3 | Status: SHIPPED | OUTPATIENT
Start: 2024-04-23

## 2024-04-23 NOTE — TELEPHONE ENCOUNTER
From: Mustapha Naranjo  To: Dr. Elise Ge  Sent: 4/23/2024 7:43 AM EDT  Subject: Stomach medicine    Can you please change my prescription for the pepcid back to My previous prescription my insurance will not pay for the pepcid, please [facility]

## 2024-05-08 DIAGNOSIS — D50.8 OTHER IRON DEFICIENCY ANEMIA: ICD-10-CM

## 2024-05-09 RX ORDER — FERROUS SULFATE 325(65) MG
TABLET, DELAYED RELEASE (ENTERIC COATED) ORAL
Qty: 180 TABLET | Refills: 3 | OUTPATIENT
Start: 2024-05-09

## 2024-10-16 ENCOUNTER — OFFICE VISIT (OUTPATIENT)
Dept: FAMILY MEDICINE CLINIC | Facility: CLINIC | Age: 59
End: 2024-10-16
Payer: COMMERCIAL

## 2024-10-16 VITALS
DIASTOLIC BLOOD PRESSURE: 73 MMHG | HEART RATE: 96 BPM | OXYGEN SATURATION: 99 % | TEMPERATURE: 98.1 F | RESPIRATION RATE: 16 BRPM | SYSTOLIC BLOOD PRESSURE: 130 MMHG | HEIGHT: 73 IN | WEIGHT: 260 LBS | BODY MASS INDEX: 34.46 KG/M2

## 2024-10-16 DIAGNOSIS — E11.9 CONTROLLED TYPE 2 DIABETES MELLITUS WITHOUT COMPLICATION, WITHOUT LONG-TERM CURRENT USE OF INSULIN (HCC): Primary | ICD-10-CM

## 2024-10-16 DIAGNOSIS — G89.29 CHRONIC PAIN OF BOTH KNEES: ICD-10-CM

## 2024-10-16 DIAGNOSIS — F33.1 MODERATE EPISODE OF RECURRENT MAJOR DEPRESSIVE DISORDER (HCC): ICD-10-CM

## 2024-10-16 DIAGNOSIS — M25.561 CHRONIC PAIN OF BOTH KNEES: ICD-10-CM

## 2024-10-16 DIAGNOSIS — D50.8 OTHER IRON DEFICIENCY ANEMIA: ICD-10-CM

## 2024-10-16 DIAGNOSIS — F41.9 ANXIETY: ICD-10-CM

## 2024-10-16 DIAGNOSIS — G57.93 NEUROPATHY OF BOTH FEET: ICD-10-CM

## 2024-10-16 DIAGNOSIS — F51.01 PRIMARY INSOMNIA: ICD-10-CM

## 2024-10-16 DIAGNOSIS — M25.562 CHRONIC PAIN OF BOTH KNEES: ICD-10-CM

## 2024-10-16 DIAGNOSIS — E11.40 TYPE 2 DIABETES MELLITUS WITH DIABETIC NEUROPATHY, WITHOUT LONG-TERM CURRENT USE OF INSULIN (HCC): ICD-10-CM

## 2024-10-16 DIAGNOSIS — K21.9 GASTROESOPHAGEAL REFLUX DISEASE WITHOUT ESOPHAGITIS: ICD-10-CM

## 2024-10-16 LAB
ALBUMIN SERPL-MCNC: 3.5 G/DL (ref 3.2–4.6)
ALBUMIN/GLOB SERPL: 0.8 (ref 1–1.9)
ALP SERPL-CCNC: 119 U/L (ref 40–129)
ALT SERPL-CCNC: 7 U/L (ref 8–55)
ANION GAP SERPL CALC-SCNC: 11 MMOL/L (ref 9–18)
AST SERPL-CCNC: 20 U/L (ref 15–37)
BASOPHILS # BLD: 0.1 K/UL (ref 0–0.2)
BASOPHILS NFR BLD: 1 % (ref 0–2)
BILIRUB SERPL-MCNC: 0.2 MG/DL (ref 0–1.2)
BUN SERPL-MCNC: 19 MG/DL (ref 8–23)
CALCIUM SERPL-MCNC: 9.4 MG/DL (ref 8.8–10.2)
CHLORIDE SERPL-SCNC: 101 MMOL/L (ref 98–107)
CHOLEST SERPL-MCNC: 163 MG/DL (ref 0–200)
CO2 SERPL-SCNC: 27 MMOL/L (ref 20–28)
CREAT SERPL-MCNC: 0.62 MG/DL (ref 0.8–1.3)
DIFFERENTIAL METHOD BLD: ABNORMAL
EOSINOPHIL # BLD: 0.3 K/UL (ref 0–0.8)
EOSINOPHIL NFR BLD: 3 % (ref 0.5–7.8)
ERYTHROCYTE [DISTWIDTH] IN BLOOD BY AUTOMATED COUNT: 15.9 % (ref 11.9–14.6)
EST. AVERAGE GLUCOSE BLD GHB EST-MCNC: 116 MG/DL
GLOBULIN SER CALC-MCNC: 4.6 G/DL (ref 2.3–3.5)
GLUCOSE SERPL-MCNC: 98 MG/DL (ref 70–99)
HBA1C MFR BLD: 5.7 % (ref 0–5.6)
HCT VFR BLD AUTO: 42 % (ref 41.1–50.3)
HDLC SERPL-MCNC: 33 MG/DL (ref 40–60)
HDLC SERPL: 5 (ref 0–5)
HGB BLD-MCNC: 12.4 G/DL (ref 13.6–17.2)
IMM GRANULOCYTES # BLD AUTO: 0 K/UL (ref 0–0.5)
IMM GRANULOCYTES NFR BLD AUTO: 0 % (ref 0–5)
LDLC SERPL CALC-MCNC: 105 MG/DL (ref 0–100)
LYMPHOCYTES # BLD: 2.8 K/UL (ref 0.5–4.6)
LYMPHOCYTES NFR BLD: 26 % (ref 13–44)
MCH RBC QN AUTO: 22.7 PG (ref 26.1–32.9)
MCHC RBC AUTO-ENTMCNC: 29.5 G/DL (ref 31.4–35)
MCV RBC AUTO: 76.8 FL (ref 82–102)
MONOCYTES # BLD: 0.6 K/UL (ref 0.1–1.3)
MONOCYTES NFR BLD: 6 % (ref 4–12)
NEUTS SEG # BLD: 6.7 K/UL (ref 1.7–8.2)
NEUTS SEG NFR BLD: 64 % (ref 43–78)
NRBC # BLD: 0 K/UL (ref 0–0.2)
PLATELET # BLD AUTO: 373 K/UL (ref 150–450)
PMV BLD AUTO: 9.6 FL (ref 9.4–12.3)
POTASSIUM SERPL-SCNC: 4.4 MMOL/L (ref 3.5–5.1)
PROT SERPL-MCNC: 8.1 G/DL (ref 6.3–8.2)
RBC # BLD AUTO: 5.47 M/UL (ref 4.23–5.6)
SODIUM SERPL-SCNC: 139 MMOL/L (ref 136–145)
TRIGL SERPL-MCNC: 128 MG/DL (ref 0–150)
VLDLC SERPL CALC-MCNC: 26 MG/DL (ref 6–23)
WBC # BLD AUTO: 10.6 K/UL (ref 4.3–11.1)

## 2024-10-16 PROCEDURE — 99214 OFFICE O/P EST MOD 30 MIN: CPT | Performed by: FAMILY MEDICINE

## 2024-10-16 PROCEDURE — 3044F HG A1C LEVEL LT 7.0%: CPT | Performed by: FAMILY MEDICINE

## 2024-10-16 RX ORDER — TAMSULOSIN HYDROCHLORIDE 0.4 MG/1
0.4 CAPSULE ORAL DAILY
Qty: 30 CAPSULE | Refills: 11 | Status: SHIPPED | OUTPATIENT
Start: 2024-10-16

## 2024-10-16 RX ORDER — OMEPRAZOLE 40 MG/1
40 CAPSULE, DELAYED RELEASE ORAL 2 TIMES DAILY
Qty: 60 CAPSULE | Refills: 11 | Status: SHIPPED | OUTPATIENT
Start: 2024-10-16

## 2024-10-16 RX ORDER — DULOXETIN HYDROCHLORIDE 60 MG/1
CAPSULE, DELAYED RELEASE ORAL
Qty: 90 CAPSULE | Refills: 11 | Status: SHIPPED | OUTPATIENT
Start: 2024-10-16

## 2024-10-16 RX ORDER — DICLOFENAC 16.05 MG/ML
SOLUTION TOPICAL
Qty: 100 ML | Refills: 11 | Status: SHIPPED | OUTPATIENT
Start: 2024-10-16

## 2024-10-16 RX ORDER — FERROUS SULFATE 325(65) MG
TABLET, DELAYED RELEASE (ENTERIC COATED) ORAL
Qty: 60 TABLET | Refills: 3 | Status: SHIPPED | OUTPATIENT
Start: 2024-10-16

## 2024-10-16 RX ORDER — TRAZODONE HYDROCHLORIDE 50 MG/1
50 TABLET, FILM COATED ORAL NIGHTLY PRN
Qty: 30 TABLET | Refills: 11 | Status: SHIPPED | OUTPATIENT
Start: 2024-10-16

## 2024-10-16 SDOH — ECONOMIC STABILITY: INCOME INSECURITY: HOW HARD IS IT FOR YOU TO PAY FOR THE VERY BASICS LIKE FOOD, HOUSING, MEDICAL CARE, AND HEATING?: NOT HARD AT ALL

## 2024-10-16 SDOH — ECONOMIC STABILITY: FOOD INSECURITY: WITHIN THE PAST 12 MONTHS, YOU WORRIED THAT YOUR FOOD WOULD RUN OUT BEFORE YOU GOT MONEY TO BUY MORE.: NEVER TRUE

## 2024-10-16 SDOH — ECONOMIC STABILITY: FOOD INSECURITY: WITHIN THE PAST 12 MONTHS, THE FOOD YOU BOUGHT JUST DIDN'T LAST AND YOU DIDN'T HAVE MONEY TO GET MORE.: NEVER TRUE

## 2024-10-16 ASSESSMENT — PATIENT HEALTH QUESTIONNAIRE - PHQ9
SUM OF ALL RESPONSES TO PHQ QUESTIONS 1-9: 3
1. LITTLE INTEREST OR PLEASURE IN DOING THINGS: NOT AT ALL
SUM OF ALL RESPONSES TO PHQ QUESTIONS 1-9: 3
8. MOVING OR SPEAKING SO SLOWLY THAT OTHER PEOPLE COULD HAVE NOTICED. OR THE OPPOSITE, BEING SO FIGETY OR RESTLESS THAT YOU HAVE BEEN MOVING AROUND A LOT MORE THAN USUAL: NOT AT ALL
SUM OF ALL RESPONSES TO PHQ QUESTIONS 1-9: 3
4. FEELING TIRED OR HAVING LITTLE ENERGY: MORE THAN HALF THE DAYS
10. IF YOU CHECKED OFF ANY PROBLEMS, HOW DIFFICULT HAVE THESE PROBLEMS MADE IT FOR YOU TO DO YOUR WORK, TAKE CARE OF THINGS AT HOME, OR GET ALONG WITH OTHER PEOPLE: VERY DIFFICULT
9. THOUGHTS THAT YOU WOULD BE BETTER OFF DEAD, OR OF HURTING YOURSELF: NOT AT ALL
3. TROUBLE FALLING OR STAYING ASLEEP: NOT AT ALL
SUM OF ALL RESPONSES TO PHQ QUESTIONS 1-9: 3
5. POOR APPETITE OR OVEREATING: SEVERAL DAYS
2. FEELING DOWN, DEPRESSED OR HOPELESS: NOT AT ALL
SUM OF ALL RESPONSES TO PHQ9 QUESTIONS 1 & 2: 0
6. FEELING BAD ABOUT YOURSELF - OR THAT YOU ARE A FAILURE OR HAVE LET YOURSELF OR YOUR FAMILY DOWN: NOT AT ALL
7. TROUBLE CONCENTRATING ON THINGS, SUCH AS READING THE NEWSPAPER OR WATCHING TELEVISION: NOT AT ALL

## 2024-10-16 NOTE — PROGRESS NOTES
[facility] [place] [address]  Phone: [phone] Fax [phone]  Elise Ge MD  10/16/2024         Mr. Naranjo  is a 59 y.o.  year old  male patient who comes in to check on diabetes, hypertension, and high cholesterol.    He does not usually check his sugars as they have been well-controlled.    Sugars have been running unknown.  He has been controlling it with diet and has done well.  He did have a gastric bypass.   Last eye exam was last year. Feet have neuropathy problems.      Did have a pneumonia shot unknown. Did have a tetanus shot unknown. Blood pressure has been under good control.      He has lost a good bit of weight and continues to do so.  He did have back surgery that went awry and had to have it redone and has been disabled since that time and can only walk with a walker and even with that he is unsteady.    He was wondering about getting the trazodone that he took in the past for sleep to help him sleep.    He also feels weak and wonders if his iron level could be low.  He has not been taking iron supplements currently.        He does do Cymbalta for neuropathy and pain and depression and anxiety.           Mr. Naranjo  has  has a past medical history of Arthritis, Benign prostatic hyperplasia, Chronic pain, Diabetes mellitus (HCC), Fatty liver, Former smoker, GERD (gastroesophageal reflux disease), History of staph infection, Hypertension, Ill-defined disease, Impaired mobility, Insomnia, Morbid obesity, Other ill-defined conditions(799.89), Other ill-defined conditions(799.89), Other ill-defined conditions(799.89), Peptic ulcer, Peripheral neuropathy, and Unspecified sleep apnea.    Mr. Naranjo  has  has a past surgical history that includes Appendectomy; sinus surgery; Tonsillectomy and adenoidectomy; Endoscopy, colon, diagnostic; Colonoscopy; Upper gastrointestinal endoscopy (N/A, 08/05/2022); Eye surgery (Left); Varicose vein surgery

## 2025-02-12 ENCOUNTER — LAB (OUTPATIENT)
Dept: UROLOGY | Age: 60
End: 2025-02-12

## 2025-02-12 DIAGNOSIS — N41.1 CHRONIC PROSTATITIS: ICD-10-CM

## 2025-02-12 DIAGNOSIS — N13.8 BPH WITH OBSTRUCTION/LOWER URINARY TRACT SYMPTOMS: ICD-10-CM

## 2025-02-12 DIAGNOSIS — N40.1 BPH WITH OBSTRUCTION/LOWER URINARY TRACT SYMPTOMS: ICD-10-CM

## 2025-02-12 LAB — PSA SERPL-MCNC: 0.3 NG/ML (ref 0–4)

## 2025-02-19 ENCOUNTER — OFFICE VISIT (OUTPATIENT)
Dept: UROLOGY | Age: 60
End: 2025-02-19
Payer: COMMERCIAL

## 2025-02-19 DIAGNOSIS — N53.14 RETROGRADE EJACULATION: ICD-10-CM

## 2025-02-19 DIAGNOSIS — N40.1 BPH WITH OBSTRUCTION/LOWER URINARY TRACT SYMPTOMS: Primary | ICD-10-CM

## 2025-02-19 DIAGNOSIS — N13.8 BPH WITH OBSTRUCTION/LOWER URINARY TRACT SYMPTOMS: Primary | ICD-10-CM

## 2025-02-19 DIAGNOSIS — N41.1 CHRONIC PROSTATITIS: ICD-10-CM

## 2025-02-19 DIAGNOSIS — N52.9 ERECTILE DYSFUNCTION, UNSPECIFIED ERECTILE DYSFUNCTION TYPE: ICD-10-CM

## 2025-02-19 LAB
BILIRUBIN, URINE, POC: NEGATIVE
BLOOD URINE, POC: NEGATIVE
GLUCOSE URINE, POC: NEGATIVE MG/DL
KETONES, URINE, POC: NORMAL MG/DL
LEUKOCYTE ESTERASE, URINE, POC: NEGATIVE
NITRITE, URINE, POC: NEGATIVE
PH, URINE, POC: 5.5 (ref 4.6–8)
PROTEIN,URINE, POC: 100 MG/DL
SPECIFIC GRAVITY, URINE, POC: 1.03 (ref 1–1.03)
URINALYSIS CLARITY, POC: NORMAL
URINALYSIS COLOR, POC: NORMAL
UROBILINOGEN, POC: NORMAL MG/DL

## 2025-02-19 PROCEDURE — 99214 OFFICE O/P EST MOD 30 MIN: CPT | Performed by: NURSE PRACTITIONER

## 2025-02-19 PROCEDURE — 81003 URINALYSIS AUTO W/O SCOPE: CPT | Performed by: NURSE PRACTITIONER

## 2025-02-19 RX ORDER — ALFUZOSIN HYDROCHLORIDE 10 MG/1
10 TABLET, EXTENDED RELEASE ORAL DAILY
Qty: 90 TABLET | Refills: 3 | Status: SHIPPED | OUTPATIENT
Start: 2025-02-19

## 2025-02-19 RX ORDER — TADALAFIL 5 MG/1
5 TABLET ORAL DAILY
Qty: 90 TABLET | Refills: 3 | Status: SHIPPED | OUTPATIENT
Start: 2025-02-19

## 2025-02-19 ASSESSMENT — ENCOUNTER SYMPTOMS
NAUSEA: 0
BACK PAIN: 0

## 2025-02-19 NOTE — PROGRESS NOTES
[facility]  [address]  [place] [address]  [phone]          Mustapha Naranjo  : 1965    Chief Complaint   Patient presents with    Follow-up          HPI     Mustapha Naranjo is a 59 y.o. male returns in follow up for chronic prostatitis. Pt reports two flares in past yr self treated with Cipro. Previously on IM testosterone that he stopped due to LE swelling. Cont to struggle with lower back issues. Had surgery last year. Had difficulty voiding after. This resolved. Reports improved hesitancy on Flomax.  having low volume ejaculate. Reports history of stones.  PSA trend below:     Lab Results   Component Value Date    PSA 0.3 2025    PSA 0.2 2024    PSA 0.2 2023    PSA 0.2 12/10/2021    PSA 0.2 2020    PSA 0.4 2019     Has ED. Unable to tolerate Viagra.       Past Medical History:   Diagnosis Date    Arthritis     osteo - in back, hips    Benign prostatic hyperplasia     Chronic pain     hips, neck, back    Diabetes mellitus (HCC)     Controlled type 2 diabetes mellitus without complication, without long-term current use of insulin;  no bs checks at home; no meds; A1c=6.3 on 22    Fatty liver     Former smoker     GERD (gastroesophageal reflux disease)     on med for control    History of staph infection     appendix ruptured    Hypertension     on med for control    Ill-defined disease     swelling of both lower legs- SCIATICA- legs feel like \"burning\"    Impaired mobility     walks with cane    Insomnia     Morbid obesity     BMI=51.81    Other ill-defined conditions(799.89)     stuffy nose/ infection    Other ill-defined conditions(799.89)     spinal stenosis    Other ill-defined conditions(799.89)     several kidney stones-passed on own    Peptic ulcer 10/11/2013    Peripheral neuropathy     hands and feet    Unspecified sleep apnea     c-pap nightly; instructed to bring dos     Past Surgical History:   Procedure

## 2025-04-03 DIAGNOSIS — D50.8 OTHER IRON DEFICIENCY ANEMIA: ICD-10-CM

## 2025-04-03 DIAGNOSIS — I10 ESSENTIAL HYPERTENSION: ICD-10-CM

## 2025-04-03 DIAGNOSIS — E11.40 TYPE 2 DIABETES MELLITUS WITH DIABETIC NEUROPATHY, WITHOUT LONG-TERM CURRENT USE OF INSULIN (HCC): Primary | ICD-10-CM

## 2025-04-14 ENCOUNTER — LAB (OUTPATIENT)
Dept: FAMILY MEDICINE CLINIC | Facility: CLINIC | Age: 60
End: 2025-04-14

## 2025-04-14 DIAGNOSIS — E11.40 TYPE 2 DIABETES MELLITUS WITH DIABETIC NEUROPATHY, WITHOUT LONG-TERM CURRENT USE OF INSULIN (HCC): ICD-10-CM

## 2025-04-14 DIAGNOSIS — D50.8 OTHER IRON DEFICIENCY ANEMIA: ICD-10-CM

## 2025-04-14 DIAGNOSIS — I10 ESSENTIAL HYPERTENSION: ICD-10-CM

## 2025-04-14 LAB
ANION GAP SERPL CALC-SCNC: 12 MMOL/L (ref 7–16)
BASOPHILS # BLD: 0.03 K/UL (ref 0–0.2)
BASOPHILS NFR BLD: 0.4 % (ref 0–2)
BUN SERPL-MCNC: 15 MG/DL (ref 8–23)
CALCIUM SERPL-MCNC: 9.2 MG/DL (ref 8.8–10.2)
CHLORIDE SERPL-SCNC: 103 MMOL/L (ref 98–107)
CHOLEST SERPL-MCNC: 134 MG/DL (ref 0–200)
CO2 SERPL-SCNC: 24 MMOL/L (ref 20–29)
CREAT SERPL-MCNC: 0.61 MG/DL (ref 0.8–1.3)
DIFFERENTIAL METHOD BLD: ABNORMAL
EOSINOPHIL # BLD: 0.14 K/UL (ref 0–0.8)
EOSINOPHIL NFR BLD: 1.9 % (ref 0.5–7.8)
ERYTHROCYTE [DISTWIDTH] IN BLOOD BY AUTOMATED COUNT: 17 % (ref 11.9–14.6)
EST. AVERAGE GLUCOSE BLD GHB EST-MCNC: 97 MG/DL
GLUCOSE SERPL-MCNC: 101 MG/DL (ref 70–99)
HBA1C MFR BLD: 5 % (ref 0–5.6)
HCT VFR BLD AUTO: 39.8 % (ref 41.1–50.3)
HDLC SERPL-MCNC: 34 MG/DL (ref 40–60)
HDLC SERPL: 3.9 (ref 0–5)
HGB BLD-MCNC: 11.8 G/DL (ref 13.6–17.2)
IMM GRANULOCYTES # BLD AUTO: 0.03 K/UL (ref 0–0.5)
IMM GRANULOCYTES NFR BLD AUTO: 0.4 % (ref 0–5)
LDLC SERPL CALC-MCNC: 77 MG/DL (ref 0–100)
LYMPHOCYTES # BLD: 1.55 K/UL (ref 0.5–4.6)
LYMPHOCYTES NFR BLD: 21.3 % (ref 13–44)
MCH RBC QN AUTO: 22.8 PG (ref 26.1–32.9)
MCHC RBC AUTO-ENTMCNC: 29.6 G/DL (ref 31.4–35)
MCV RBC AUTO: 76.8 FL (ref 82–102)
MONOCYTES # BLD: 0.61 K/UL (ref 0.1–1.3)
MONOCYTES NFR BLD: 8.4 % (ref 4–12)
NEUTS SEG # BLD: 4.93 K/UL (ref 1.7–8.2)
NEUTS SEG NFR BLD: 67.6 % (ref 43–78)
NRBC # BLD: 0 K/UL (ref 0–0.2)
PLATELET # BLD AUTO: 293 K/UL (ref 150–450)
PMV BLD AUTO: 10 FL (ref 9.4–12.3)
POTASSIUM SERPL-SCNC: 3.8 MMOL/L (ref 3.5–5.1)
RBC # BLD AUTO: 5.18 M/UL (ref 4.23–5.6)
SODIUM SERPL-SCNC: 139 MMOL/L (ref 136–145)
TRIGL SERPL-MCNC: 115 MG/DL (ref 0–150)
VLDLC SERPL CALC-MCNC: 23 MG/DL (ref 6–23)
WBC # BLD AUTO: 7.3 K/UL (ref 4.3–11.1)

## 2025-04-21 ENCOUNTER — OFFICE VISIT (OUTPATIENT)
Dept: FAMILY MEDICINE CLINIC | Facility: CLINIC | Age: 60
End: 2025-04-21
Payer: COMMERCIAL

## 2025-04-21 VITALS
DIASTOLIC BLOOD PRESSURE: 50 MMHG | HEIGHT: 73 IN | WEIGHT: 251.6 LBS | SYSTOLIC BLOOD PRESSURE: 110 MMHG | BODY MASS INDEX: 33.34 KG/M2 | HEART RATE: 102 BPM | OXYGEN SATURATION: 98 % | TEMPERATURE: 98 F

## 2025-04-21 DIAGNOSIS — E11.9 CONTROLLED TYPE 2 DIABETES MELLITUS WITHOUT COMPLICATION, WITHOUT LONG-TERM CURRENT USE OF INSULIN (HCC): Primary | ICD-10-CM

## 2025-04-21 DIAGNOSIS — L97.529 TYPE 2 DIABETES MELLITUS WITH LEFT DIABETIC FOOT ULCER (HCC): ICD-10-CM

## 2025-04-21 DIAGNOSIS — D50.8 OTHER IRON DEFICIENCY ANEMIA: ICD-10-CM

## 2025-04-21 DIAGNOSIS — M16.0 ARTHRITIS OF BOTH HIPS: ICD-10-CM

## 2025-04-21 DIAGNOSIS — E11.621 TYPE 2 DIABETES MELLITUS WITH LEFT DIABETIC FOOT ULCER (HCC): ICD-10-CM

## 2025-04-21 DIAGNOSIS — M51.360 DEGENERATION OF INTERVERTEBRAL DISC OF LUMBAR REGION WITH DISCOGENIC BACK PAIN: ICD-10-CM

## 2025-04-21 DIAGNOSIS — E11.40 TYPE 2 DIABETES MELLITUS WITH DIABETIC NEUROPATHY, WITHOUT LONG-TERM CURRENT USE OF INSULIN (HCC): ICD-10-CM

## 2025-04-21 DIAGNOSIS — N13.8 BPH WITH OBSTRUCTION/LOWER URINARY TRACT SYMPTOMS: ICD-10-CM

## 2025-04-21 DIAGNOSIS — M54.16 LUMBAR RADICULOPATHY, CHRONIC: ICD-10-CM

## 2025-04-21 DIAGNOSIS — N40.1 BPH WITH OBSTRUCTION/LOWER URINARY TRACT SYMPTOMS: ICD-10-CM

## 2025-04-21 DIAGNOSIS — N41.1 CHRONIC PROSTATITIS: ICD-10-CM

## 2025-04-21 PROCEDURE — 99214 OFFICE O/P EST MOD 30 MIN: CPT | Performed by: FAMILY MEDICINE

## 2025-04-21 PROCEDURE — 3044F HG A1C LEVEL LT 7.0%: CPT | Performed by: FAMILY MEDICINE

## 2025-04-21 PROCEDURE — G2211 COMPLEX E/M VISIT ADD ON: HCPCS | Performed by: FAMILY MEDICINE

## 2025-04-21 RX ORDER — MUPIROCIN 20 MG/G
OINTMENT TOPICAL
Qty: 8 G | Refills: 1 | Status: SHIPPED | OUTPATIENT
Start: 2025-04-21 | End: 2025-04-28

## 2025-04-21 RX ORDER — FOLIC ACID 1 MG/1
1 TABLET ORAL DAILY
Qty: 90 TABLET | Refills: 3 | Status: CANCELLED | OUTPATIENT
Start: 2025-04-21

## 2025-04-21 RX ORDER — CEPHALEXIN 500 MG/1
500 CAPSULE ORAL 4 TIMES DAILY
Qty: 20 CAPSULE | Refills: 0 | Status: SHIPPED | OUTPATIENT
Start: 2025-04-21

## 2025-04-21 RX ORDER — CYCLOBENZAPRINE HCL 5 MG
TABLET ORAL
Qty: 90 TABLET | Refills: 3 | Status: SHIPPED | OUTPATIENT
Start: 2025-04-21

## 2025-04-21 SDOH — ECONOMIC STABILITY: FOOD INSECURITY: WITHIN THE PAST 12 MONTHS, YOU WORRIED THAT YOUR FOOD WOULD RUN OUT BEFORE YOU GOT MONEY TO BUY MORE.: NEVER TRUE

## 2025-04-21 SDOH — ECONOMIC STABILITY: FOOD INSECURITY: WITHIN THE PAST 12 MONTHS, THE FOOD YOU BOUGHT JUST DIDN'T LAST AND YOU DIDN'T HAVE MONEY TO GET MORE.: NEVER TRUE

## 2025-04-21 ASSESSMENT — PATIENT HEALTH QUESTIONNAIRE - PHQ9
6. FEELING BAD ABOUT YOURSELF - OR THAT YOU ARE A FAILURE OR HAVE LET YOURSELF OR YOUR FAMILY DOWN: NOT AT ALL
2. FEELING DOWN, DEPRESSED OR HOPELESS: NOT AT ALL
3. TROUBLE FALLING OR STAYING ASLEEP: NOT AT ALL
SUM OF ALL RESPONSES TO PHQ QUESTIONS 1-9: 0
5. POOR APPETITE OR OVEREATING: NOT AT ALL
10. IF YOU CHECKED OFF ANY PROBLEMS, HOW DIFFICULT HAVE THESE PROBLEMS MADE IT FOR YOU TO DO YOUR WORK, TAKE CARE OF THINGS AT HOME, OR GET ALONG WITH OTHER PEOPLE: NOT DIFFICULT AT ALL
4. FEELING TIRED OR HAVING LITTLE ENERGY: NOT AT ALL
8. MOVING OR SPEAKING SO SLOWLY THAT OTHER PEOPLE COULD HAVE NOTICED. OR THE OPPOSITE, BEING SO FIGETY OR RESTLESS THAT YOU HAVE BEEN MOVING AROUND A LOT MORE THAN USUAL: NOT AT ALL
SUM OF ALL RESPONSES TO PHQ QUESTIONS 1-9: 0
9. THOUGHTS THAT YOU WOULD BE BETTER OFF DEAD, OR OF HURTING YOURSELF: NOT AT ALL
SUM OF ALL RESPONSES TO PHQ QUESTIONS 1-9: 0
1. LITTLE INTEREST OR PLEASURE IN DOING THINGS: NOT AT ALL
7. TROUBLE CONCENTRATING ON THINGS, SUCH AS READING THE NEWSPAPER OR WATCHING TELEVISION: NOT AT ALL
SUM OF ALL RESPONSES TO PHQ QUESTIONS 1-9: 0

## 2025-04-21 NOTE — PROGRESS NOTES
diabetes. The diagnosis will be retained in the chart to facilitate future blood work. He is advised to continue his current exercise routine, including weight lifting.    1. Anemia.  Hemoglobin level is slightly below the desired range at 11.8. He is advised to continue his current regimen of two different multivitamins to prevent further decline in hemoglobin levels. A re-evaluation of hemoglobin levels will be conducted during his next visit.      3. Erectile Dysfunction.  He reports that Cialis 5 mg daily is effective but not as satisfactory as desired. He is advised to consult with his urologist regarding the possibility of increasing the dosage to 10 mg daily.     4. Non-healing Wound on Toe.  The wound has been present for approximately 4 months and appears to be infected. He is advised to soak his feet in warm Epsom salts once daily and to wear shoes regularly. A referral to the wound center will be made for further evaluation and management. A prescription for Keflex will be provided.    5. Bruising.  Some cellulitis on his arms.  He reports significant bruising, which has improved slightly after discontinuing blood thinners but remains problematic. An ointment will be prescribed for application on the affected areas.    6.  Bipolar disorder in the past, ADHD.  He reports recent symptoms suggestive of ADHD and a history of bipolar disorder. A referral to psychiatry will be made when he desires it for further management.    Follow-up  The patient will follow up in 6 months.    Arthritis of back and hips.  Handicap sticker was filled out.      Patient counseled on diet and exercise.    Elise Ge MD    The patient (or guardian, if applicable) and other individuals in attendance with the patient were advised that Artificial Intelligence will be utilized during this visit to record, process the conversation to generate a clinical note, and support improvement of the AI technology. The patient (or

## 2025-05-01 ENCOUNTER — TELEPHONE (OUTPATIENT)
Dept: FAMILY MEDICINE CLINIC | Facility: CLINIC | Age: 60
End: 2025-05-01

## 2025-05-01 DIAGNOSIS — R60.9 EDEMA, UNSPECIFIED TYPE: Primary | ICD-10-CM

## 2025-05-01 RX ORDER — FUROSEMIDE 20 MG/1
TABLET ORAL
Qty: 10 TABLET | Refills: 0 | Status: SHIPPED | OUTPATIENT
Start: 2025-05-01

## 2025-05-01 NOTE — TELEPHONE ENCOUNTER
Pt called requesting a new refill request for Lasix. Stated both of his legs are very swollen and has attempted on elevating them to help but it has not improved. The pt is now having difficulty walking.    Asked pt if there was a possibility for him to come in but stated due to the difficulty walking he is unable to.    Please advise....

## 2025-05-01 NOTE — TELEPHONE ENCOUNTER
I sent in some lasix as a temporary fix until he can come in to be seen.  Please schedule an appointment .    Prescription (s) refilled  It (they) has been escribed to the pharmacy.    Requested Prescriptions     Signed Prescriptions Disp Refills    furosemide (LASIX) 20 MG tablet 10 tablet 0     Si po qd prn swelling     Authorizing Provider: [place]     No orders of the defined types were placed in this encounter.          ICD-10-CM    1. Edema, unspecified type  R60.9 furosemide (LASIX) 20 MG tablet

## 2025-05-06 ENCOUNTER — OFFICE VISIT (OUTPATIENT)
Dept: FAMILY MEDICINE CLINIC | Facility: CLINIC | Age: 60
End: 2025-05-06
Payer: COMMERCIAL

## 2025-05-06 ENCOUNTER — RESULTS FOLLOW-UP (OUTPATIENT)
Dept: FAMILY MEDICINE CLINIC | Facility: CLINIC | Age: 60
End: 2025-05-06

## 2025-05-06 VITALS
HEIGHT: 73 IN | SYSTOLIC BLOOD PRESSURE: 124 MMHG | TEMPERATURE: 97.8 F | HEART RATE: 103 BPM | RESPIRATION RATE: 16 BRPM | BODY MASS INDEX: 33.94 KG/M2 | OXYGEN SATURATION: 96 % | DIASTOLIC BLOOD PRESSURE: 71 MMHG | WEIGHT: 256.1 LBS

## 2025-05-06 DIAGNOSIS — L03.116 CELLULITIS OF LEFT FOOT: ICD-10-CM

## 2025-05-06 DIAGNOSIS — L97.529 SKIN ULCERS OF FOOT, BILATERAL (HCC): ICD-10-CM

## 2025-05-06 DIAGNOSIS — L97.519 SKIN ULCERS OF FOOT, BILATERAL (HCC): ICD-10-CM

## 2025-05-06 DIAGNOSIS — R60.0 BILATERAL LOWER EXTREMITY EDEMA: Primary | ICD-10-CM

## 2025-05-06 LAB
ANION GAP SERPL CALC-SCNC: 13 MMOL/L (ref 7–16)
BUN SERPL-MCNC: 13 MG/DL (ref 8–23)
CALCIUM SERPL-MCNC: 9.1 MG/DL (ref 8.8–10.2)
CHLORIDE SERPL-SCNC: 97 MMOL/L (ref 98–107)
CO2 SERPL-SCNC: 28 MMOL/L (ref 20–29)
CREAT SERPL-MCNC: 0.72 MG/DL (ref 0.8–1.3)
GLUCOSE SERPL-MCNC: 106 MG/DL (ref 70–99)
POTASSIUM SERPL-SCNC: 3.3 MMOL/L (ref 3.5–5.1)
SODIUM SERPL-SCNC: 138 MMOL/L (ref 136–145)

## 2025-05-06 PROCEDURE — 99214 OFFICE O/P EST MOD 30 MIN: CPT | Performed by: PHYSICIAN ASSISTANT

## 2025-05-06 RX ORDER — FUROSEMIDE 20 MG/1
20 TABLET ORAL 2 TIMES DAILY
Qty: 20 TABLET | Refills: 0 | Status: SHIPPED | OUTPATIENT
Start: 2025-05-06

## 2025-05-06 RX ORDER — DOXYCYCLINE HYCLATE 100 MG
100 TABLET ORAL 2 TIMES DAILY
Qty: 20 TABLET | Refills: 0 | Status: SHIPPED | OUTPATIENT
Start: 2025-05-06 | End: 2025-05-16

## 2025-05-06 ASSESSMENT — PATIENT HEALTH QUESTIONNAIRE - PHQ9
2. FEELING DOWN, DEPRESSED OR HOPELESS: NOT AT ALL
8. MOVING OR SPEAKING SO SLOWLY THAT OTHER PEOPLE COULD HAVE NOTICED. OR THE OPPOSITE, BEING SO FIGETY OR RESTLESS THAT YOU HAVE BEEN MOVING AROUND A LOT MORE THAN USUAL: NOT AT ALL
4. FEELING TIRED OR HAVING LITTLE ENERGY: NOT AT ALL
9. THOUGHTS THAT YOU WOULD BE BETTER OFF DEAD, OR OF HURTING YOURSELF: NOT AT ALL
SUM OF ALL RESPONSES TO PHQ QUESTIONS 1-9: 0
6. FEELING BAD ABOUT YOURSELF - OR THAT YOU ARE A FAILURE OR HAVE LET YOURSELF OR YOUR FAMILY DOWN: NOT AT ALL
SUM OF ALL RESPONSES TO PHQ QUESTIONS 1-9: 0
SUM OF ALL RESPONSES TO PHQ QUESTIONS 1-9: 0
10. IF YOU CHECKED OFF ANY PROBLEMS, HOW DIFFICULT HAVE THESE PROBLEMS MADE IT FOR YOU TO DO YOUR WORK, TAKE CARE OF THINGS AT HOME, OR GET ALONG WITH OTHER PEOPLE: NOT DIFFICULT AT ALL
3. TROUBLE FALLING OR STAYING ASLEEP: NOT AT ALL
1. LITTLE INTEREST OR PLEASURE IN DOING THINGS: NOT AT ALL
5. POOR APPETITE OR OVEREATING: NOT AT ALL
SUM OF ALL RESPONSES TO PHQ QUESTIONS 1-9: 0
7. TROUBLE CONCENTRATING ON THINGS, SUCH AS READING THE NEWSPAPER OR WATCHING TELEVISION: NOT AT ALL

## 2025-05-06 ASSESSMENT — ENCOUNTER SYMPTOMS
SHORTNESS OF BREATH: 0
COLOR CHANGE: 1

## 2025-05-06 NOTE — PATIENT INSTRUCTIONS
*We will call you with ultrasound results as soon as they are available.  *Take the Lasix in the morning and afternoon.  *Elevate legs when able.  *Decrease salt intake.  *Take the antibiotic (Doxycycline) as prescribed for 10 days.  *A referral has been placed to wound care. They should contact you in the next week to schedule. Please let us know if you do not hear from them.

## 2025-05-06 NOTE — PROGRESS NOTES
[facility] [address] [address]  Phone [phone]      Patient: Mustapha Naranjo  YOB: 1965  Age 60 y.o.  Sex male  Medical Record:  [id number]  Visit Date: 05/06/25  Author:  Quintin Calderon PA-C    [facility] Practice Clinic Note    Chief Complaint   Patient presents with    Leg Swelling     Left leg worse than right  Feet swelling also      Other     New ulcerations both feet       History of Present Illness  History of Present Illness  The patient is a 60-year-old male who presents for evaluation of bilateral lower extremity edema and foot ulcers.    He reports experiencing bilateral lower extremity edema, with the left leg exhibiting more pronounced swelling than the right. This condition has been ongoing for approximately 2 weeks.  Prior to this, he had a history of mild end-of-day swelling in his feet but not in his legs. He has a history of venous insufficiency, characterized by chronic pedal edema.  He denies chest pain, shortness of breath, orthopnea or PND.    He has a history of pulmonary embolism, diagnosed during his last hospitalization over a year ago, following a 7-hour back surgery. He was on anticoagulant therapy for 6 months post-embolism but is currently not on any blood thinners. He has a history of diabetes but this appears to be inactive now after significant weight loss, with his most recent A1c level being 5.0.     Notes that antibiotics were recently given to treat a non-healing wound on his foot that had persisted for 3 to 4 months. He had initially scheduled an appointment with the wound clinic but subsequently canceled it due to the healing progress observed with the antibiotic treatment. He has completed his course of antibiotics and has been applying a topical salve to the wound, which appears to have improved its condition.    He has developed large, painful ulcers on both feet, one lateral and one plantar, over the past 6 days.  He

## 2025-05-08 DIAGNOSIS — E87.6 HYPOKALEMIA: Primary | ICD-10-CM

## 2025-05-08 RX ORDER — POTASSIUM CHLORIDE 750 MG/1
10 TABLET, EXTENDED RELEASE ORAL DAILY
Qty: 30 TABLET | Refills: 1 | Status: SHIPPED | OUTPATIENT
Start: 2025-05-08

## 2025-05-13 ENCOUNTER — OFFICE VISIT (OUTPATIENT)
Dept: FAMILY MEDICINE CLINIC | Facility: CLINIC | Age: 60
End: 2025-05-13
Payer: COMMERCIAL

## 2025-05-13 VITALS
OXYGEN SATURATION: 98 % | HEART RATE: 100 BPM | BODY MASS INDEX: 34.27 KG/M2 | TEMPERATURE: 97.3 F | DIASTOLIC BLOOD PRESSURE: 61 MMHG | HEIGHT: 73 IN | SYSTOLIC BLOOD PRESSURE: 132 MMHG | RESPIRATION RATE: 16 BRPM | WEIGHT: 258.6 LBS

## 2025-05-13 DIAGNOSIS — R60.0 BILATERAL LOWER EXTREMITY EDEMA: Primary | ICD-10-CM

## 2025-05-13 DIAGNOSIS — L03.116 CELLULITIS OF LEFT FOOT: ICD-10-CM

## 2025-05-13 DIAGNOSIS — L97.519 SKIN ULCERS OF FOOT, BILATERAL (HCC): ICD-10-CM

## 2025-05-13 DIAGNOSIS — E87.6 HYPOKALEMIA: ICD-10-CM

## 2025-05-13 DIAGNOSIS — L97.529 SKIN ULCERS OF FOOT, BILATERAL (HCC): ICD-10-CM

## 2025-05-13 LAB
ANION GAP SERPL CALC-SCNC: 10 MMOL/L (ref 7–16)
BUN SERPL-MCNC: 14 MG/DL (ref 8–23)
CALCIUM SERPL-MCNC: 9.4 MG/DL (ref 8.8–10.2)
CHLORIDE SERPL-SCNC: 101 MMOL/L (ref 98–107)
CO2 SERPL-SCNC: 28 MMOL/L (ref 20–29)
CREAT SERPL-MCNC: 0.74 MG/DL (ref 0.8–1.3)
GLUCOSE SERPL-MCNC: 99 MG/DL (ref 70–99)
POTASSIUM SERPL-SCNC: 4.3 MMOL/L (ref 3.5–5.1)
SODIUM SERPL-SCNC: 139 MMOL/L (ref 136–145)

## 2025-05-13 PROCEDURE — 99214 OFFICE O/P EST MOD 30 MIN: CPT | Performed by: PHYSICIAN ASSISTANT

## 2025-05-13 RX ORDER — FUROSEMIDE 40 MG/1
40 TABLET ORAL 2 TIMES DAILY
Qty: 60 TABLET | Refills: 0 | Status: SHIPPED | OUTPATIENT
Start: 2025-05-13

## 2025-05-13 RX ORDER — POTASSIUM CHLORIDE 1500 MG/1
20 TABLET, EXTENDED RELEASE ORAL 2 TIMES DAILY
Qty: 60 TABLET | Refills: 0 | Status: SHIPPED | OUTPATIENT
Start: 2025-05-13

## 2025-05-13 ASSESSMENT — PATIENT HEALTH QUESTIONNAIRE - PHQ9
1. LITTLE INTEREST OR PLEASURE IN DOING THINGS: NOT AT ALL
6. FEELING BAD ABOUT YOURSELF - OR THAT YOU ARE A FAILURE OR HAVE LET YOURSELF OR YOUR FAMILY DOWN: NOT AT ALL
2. FEELING DOWN, DEPRESSED OR HOPELESS: NOT AT ALL
SUM OF ALL RESPONSES TO PHQ QUESTIONS 1-9: 0
9. THOUGHTS THAT YOU WOULD BE BETTER OFF DEAD, OR OF HURTING YOURSELF: NOT AT ALL
5. POOR APPETITE OR OVEREATING: NOT AT ALL
4. FEELING TIRED OR HAVING LITTLE ENERGY: NOT AT ALL
10. IF YOU CHECKED OFF ANY PROBLEMS, HOW DIFFICULT HAVE THESE PROBLEMS MADE IT FOR YOU TO DO YOUR WORK, TAKE CARE OF THINGS AT HOME, OR GET ALONG WITH OTHER PEOPLE: NOT DIFFICULT AT ALL
8. MOVING OR SPEAKING SO SLOWLY THAT OTHER PEOPLE COULD HAVE NOTICED. OR THE OPPOSITE, BEING SO FIGETY OR RESTLESS THAT YOU HAVE BEEN MOVING AROUND A LOT MORE THAN USUAL: NOT AT ALL
SUM OF ALL RESPONSES TO PHQ QUESTIONS 1-9: 0
3. TROUBLE FALLING OR STAYING ASLEEP: NOT AT ALL
7. TROUBLE CONCENTRATING ON THINGS, SUCH AS READING THE NEWSPAPER OR WATCHING TELEVISION: NOT AT ALL

## 2025-05-13 NOTE — PROGRESS NOTES
Metabolic Panel     Standing Status:   Future     Number of Occurrences:   1     Expected Date:   5/13/2025     Expiration Date:   5/13/2026     I have reviewed the patient's past medical history, social history and family history and vitals.  We have discussed treatment plan and follow up and given patient instructions.  Patient's questions are answered and we will follow up as indicated.    The patient (or guardian, if applicable) and other individuals in attendance with the patient were advised that Artificial Intelligence will be utilized during this visit to record, process the conversation to generate a clinical note, and support improvement of the AI technology. The patient (or guardian, if applicable) and other individuals in attendance at the appointment consented to the use of AI, including the recording.      Dictated using voice recognition software.  Proof read but unrecognized errors may exist.    Follow-up and Dispositions    Return in about 2 weeks (around 5/27/2025) for 2 week f/u .         Quintin Calderon PA-C

## 2025-05-13 NOTE — PATIENT INSTRUCTIONS
*Increase Lasix to 40 mg twice a day.  *Also need to increase potassium to 20 meq twice a day.  *Elevate legs when able.   *Recommend knee high support stocking to be worn during the day.

## 2025-05-14 ENCOUNTER — RESULTS FOLLOW-UP (OUTPATIENT)
Dept: FAMILY MEDICINE CLINIC | Facility: CLINIC | Age: 60
End: 2025-05-14

## 2025-05-14 ENCOUNTER — HOSPITAL ENCOUNTER (OUTPATIENT)
Dept: WOUND CARE | Age: 60
Discharge: HOME | End: 2025-05-14
Payer: COMMERCIAL

## 2025-05-14 VITALS
HEIGHT: 73 IN | RESPIRATION RATE: 16 BRPM | WEIGHT: 243 LBS | TEMPERATURE: 98.3 F | BODY MASS INDEX: 32.2 KG/M2 | HEART RATE: 85 BPM | DIASTOLIC BLOOD PRESSURE: 77 MMHG | SYSTOLIC BLOOD PRESSURE: 133 MMHG

## 2025-05-14 PROCEDURE — 11042 DBRDMT SUBQ TIS 1ST 20SQCM/<: CPT

## 2025-05-14 ASSESSMENT — PAIN DESCRIPTION - LOCATION: LOCATION: FOOT

## 2025-05-14 ASSESSMENT — ENCOUNTER SYMPTOMS
SHORTNESS OF BREATH: 0
COLOR CHANGE: 1

## 2025-05-14 ASSESSMENT — PAIN DESCRIPTION - DESCRIPTORS: DESCRIPTORS: SORE

## 2025-05-14 ASSESSMENT — PAIN DESCRIPTION - ORIENTATION: ORIENTATION: RIGHT

## 2025-05-14 NOTE — WOUND CARE
Discharge Instructions for  [facility]  [address]  [address]  [place] [address]  Phone [phone]   Fax [phone]      NAME:  Mustapha Naranjo  YOB: 1965  MEDICAL RECORD NUMBER:  [id number]  DATE:  5/14/2025    Return Appointment:    10 days  (5/23) with Aleyda Mcginnis NP  Return Appointment: Monday 5/19/25 with Clinician        Instructions: Right and left feet:  Clean wounds with Vashe.  Hydrofera Ready: Cut to wound size, place in wound bed, shiny side out.   Cover with ABD.  Wrap bilateral lower legs with 2 layer compression wraps.  Change dressing on Monday, 5/19/25 and follow up on Thursday or Friday 5/23/25.    Do not cut compression wraps off. If wraps become too loose or slide down, call wound center to make an appointment for dressing change. If wraps become too tight, try elevating your legs to assist fluid drainage.  Elevate legs when sitting.  Avoid prolonged standing or sitting with legs in dependent position.  Be cautious of increased salt intake as this promotes fluid retention and swelling.  Increase protein intake to promote wound healing. Mian and Ensure are examples of protein supplements.  Do not get wound wet in shower, pool or tub. May purchase cast cover at local pharmacy to keep dry in shower.        Should you experience increased redness, swelling, pain, foul odor, size of wound(s), or have a temperature over 101 degrees please contact the Wound Healing Center at [phone] or if after hours contact your primary care physician or go to the hospital emergency department.    PLEASE NOTE: IF YOU ARE UNABLE TO OBTAIN WOUND SUPPLIES, CONTINUE TO USE THE SUPPLIES YOU HAVE AVAILABLE UNTIL YOU ARE ABLE TO REACH US. IT IS MOST IMPORTANT TO KEEP THE WOUND COVERED AT ALL TIMES.    Electronically signed Martha Mcclain RN on 5/14/2025 at 9:14 AM

## 2025-05-14 NOTE — FLOWSHEET NOTE
05/14/25 0847   Wound 05/14/25 Foot Left;Medial #1   Date First Assessed/Time First Assessed: 05/14/25 0849   Present on Original Admission: Yes  Wound Approximate Age at First Assessment (Weeks): 12 weeks  Primary Wound Type: Neuropathic Ulcer  Location: Foot  Wound Location Orientation: Left;Medial  W...   Wound Image    Wound Etiology Neuropathic   Dressing Status Intact   Wound Cleansed Vashe   Dressing/Treatment Adhesive bandage   Wound Length (cm) 0.4 cm   Wound Width (cm) 0.5 cm   Wound Depth (cm) 0.1 cm   Wound Surface Area (cm^2) 0.2 cm^2   Wound Volume (cm^3) 0.02 cm^3   Wound Assessment Dry   Drainage Amount None (dry)   Odor None   Larisa-wound Assessment Dry/flaky   Wound Thickness Description not for Pressure Injury Partial thickness   Wound 05/14/25 Foot Right;Plantar #2   Date First Assessed/Time First Assessed: 05/14/25 0850   Present on Original Admission: Yes  Wound Approximate Age at First Assessment (Weeks): 2 weeks  Primary Wound Type: Neuropathic Ulcer  Location: Foot  Wound Location Orientation: Right;Plantar  ...   Wound Image     Wound Etiology Neuropathic   Dressing Status Old drainage noted   Wound Cleansed Vashe   Dressing/Treatment Adhesive bandage   Wound Length (cm) 0.7 cm   Wound Width (cm) 0.7 cm   Wound Depth (cm) 0.1 cm   Wound Surface Area (cm^2) 0.49 cm^2   Wound Volume (cm^3) 0.049 cm^3   Post-Procedure Length (cm) 0.8 cm   Post-Procedure Width (cm) 0.8 cm   Post-Procedure Depth (cm) 0.1 cm   Post-Procedure Surface Area (cm^2) 0.64 cm^2   Post-Procedure Volume (cm^3) 0.064 cm^3   Wound Assessment Pink/red   Drainage Amount Moderate (25-50%)   Drainage Description Serous   Odor None   Larisa-wound Assessment Maceration   Wound Thickness Description not for Pressure Injury Full thickness   Wound 05/14/25 Foot Left;Plantar #3   Date First Assessed/Time First Assessed: 05/14/25 0850   Present on Original Admission: Yes  Wound Approximate Age at First Assessment (Weeks): 2 weeks

## 2025-05-19 ENCOUNTER — HOSPITAL ENCOUNTER (OUTPATIENT)
Dept: WOUND CARE | Age: 60
Discharge: HOME | End: 2025-05-19
Payer: COMMERCIAL

## 2025-05-19 VITALS
HEIGHT: 73 IN | BODY MASS INDEX: 32.2 KG/M2 | DIASTOLIC BLOOD PRESSURE: 59 MMHG | SYSTOLIC BLOOD PRESSURE: 86 MMHG | WEIGHT: 243 LBS | HEART RATE: 104 BPM

## 2025-05-19 PROBLEM — E11.621 DIABETIC ULCER OF RIGHT MIDFOOT ASSOCIATED WITH TYPE 2 DIABETES MELLITUS, WITH FAT LAYER EXPOSED (HCC): Status: ACTIVE | Noted: 2025-05-19

## 2025-05-19 PROBLEM — E11.42 DIABETIC POLYNEUROPATHY ASSOCIATED WITH TYPE 2 DIABETES MELLITUS (HCC): Status: ACTIVE | Noted: 2025-05-19

## 2025-05-19 PROBLEM — L97.422 DIABETIC ULCER OF LEFT MIDFOOT ASSOCIATED WITH TYPE 2 DIABETES MELLITUS, WITH FAT LAYER EXPOSED (HCC): Status: ACTIVE | Noted: 2025-05-19

## 2025-05-19 PROBLEM — R60.0 LOWER EXTREMITY EDEMA: Status: ACTIVE | Noted: 2025-05-19

## 2025-05-19 PROBLEM — E11.621 DIABETIC ULCER OF LEFT MIDFOOT ASSOCIATED WITH TYPE 2 DIABETES MELLITUS, WITH FAT LAYER EXPOSED (HCC): Status: ACTIVE | Noted: 2025-05-19

## 2025-05-19 PROBLEM — L97.412 DIABETIC ULCER OF RIGHT MIDFOOT ASSOCIATED WITH TYPE 2 DIABETES MELLITUS, WITH FAT LAYER EXPOSED (HCC): Status: ACTIVE | Noted: 2025-05-19

## 2025-05-19 PROCEDURE — 29581 APPL MULTLAYER CMPRN SYS LEG: CPT

## 2025-05-19 ASSESSMENT — ENCOUNTER SYMPTOMS
VOMITING: 0
NAUSEA: 0

## 2025-05-19 ASSESSMENT — PAIN DESCRIPTION - ORIENTATION: ORIENTATION: RIGHT

## 2025-05-19 ASSESSMENT — PAIN DESCRIPTION - DESCRIPTORS: DESCRIPTORS: THROBBING;ACHING

## 2025-05-19 ASSESSMENT — PAIN DESCRIPTION - LOCATION: LOCATION: FOOT

## 2025-05-19 ASSESSMENT — PAIN SCALES - GENERAL: PAINLEVEL_OUTOF10: 5

## 2025-05-19 NOTE — FLOWSHEET NOTE
05/19/25 1136   Right Leg Edema Point of Measurement   Leg circumference 35 cm   Ankle circumference 25 cm   Foot circumference 25 cm   Compression Therapy 2 layer compression wrap   Left Leg Edema Point of Measurement   Leg circumference 37 cm   Ankle circumference 27 cm   Foot circumference 26 cm   Compression Therapy 2 layer compression wrap   Wound 05/14/25 Foot Left;Medial #1   Date First Assessed/Time First Assessed: 05/14/25 0849   Present on Original Admission: Yes  Wound Approximate Age at First Assessment (Weeks): 12 weeks  Primary Wound Type: Neuropathic Ulcer  Location: Foot  Wound Location Orientation: Left;Medial  W...   Wound Image    Wound Etiology Neuropathic   Wound Cleansed Soap and water   Dressing/Treatment Hydrofera blue   Wound Length (cm) 0.5 cm   Wound Width (cm) 0.5 cm   Wound Depth (cm) 0.1 cm   Wound Surface Area (cm^2) 0.25 cm^2   Change in Wound Size % (l*w) -25   Wound Volume (cm^3) 0.025 cm^3   Wound Healing % -25   Wound Assessment Pink/red   Drainage Amount Small (< 25%)   Drainage Description Serosanguinous   Wound Thickness Description not for Pressure Injury Partial thickness   Wound 05/14/25 Foot Left;Plantar #3   Date First Assessed/Time First Assessed: 05/14/25 0850   Present on Original Admission: Yes  Wound Approximate Age at First Assessment (Weeks): 2 weeks  Primary Wound Type: Diabetic Ulcer  Location: Foot  Wound Location Orientation: Left;Plantar  Woun...   Wound Image    Wound Etiology Neuropathic   Dressing Status Old drainage noted   Wound Cleansed Soap and water   Dressing/Treatment Hydrofera blue   Wound Length (cm) 1.5 cm   Wound Width (cm) 2 cm   Wound Depth (cm) 0.1 cm   Wound Surface Area (cm^2) 3 cm^2   Change in Wound Size % (l*w) 18.48   Wound Volume (cm^3) 0.3 cm^3   Wound Healing % 18   Wound Assessment [place]/red;Slough   Drainage Amount Moderate (25-50%)   Drainage Description Serosanguinous   Odor Mild   Wound Thickness Description not for Pressure

## 2025-05-19 NOTE — WOUND CARE
Multilayer Compression Wrap   (Not Unna) Below the Knee    NAME:  Mustapha Naranjo  YOB: 1965  MEDICAL RECORD NUMBER:  [id number]  DATE:  5/19/2025    Multilayer compression wrap: Removed old Multilayer wrap if indicated and wash leg with mild soap/water.  Applied primary and secondary dressing as ordered.  Applied multilayered dressing below the knee to right lower leg.  Applied multilayered dressing below the knee to left lower leg.  Instructed patient/caregiver not to remove dressing and to keep it clean and dry.   Instructed patient/caregiver on complications to report to provider, such as pain, numbness in toes, heavy drainage, and slippage of dressing.  Instructed patient on purpose of compression dressing and on activity and exercise recommendations.      Electronically signed by Judy Garces RN on 5/19/2025 at 11:40 AM

## 2025-05-22 ENCOUNTER — HOSPITAL ENCOUNTER (OUTPATIENT)
Dept: WOUND CARE | Age: 60
Discharge: HOME | End: 2025-05-22
Payer: COMMERCIAL

## 2025-05-22 VITALS
TEMPERATURE: 98.2 F | SYSTOLIC BLOOD PRESSURE: 100 MMHG | DIASTOLIC BLOOD PRESSURE: 68 MMHG | RESPIRATION RATE: 16 BRPM | HEART RATE: 84 BPM | WEIGHT: 243 LBS | BODY MASS INDEX: 32.2 KG/M2 | HEIGHT: 73 IN

## 2025-05-22 DIAGNOSIS — L97.422 DIABETIC ULCER OF LEFT MIDFOOT ASSOCIATED WITH TYPE 2 DIABETES MELLITUS, WITH FAT LAYER EXPOSED (HCC): ICD-10-CM

## 2025-05-22 DIAGNOSIS — E11.42 DIABETIC POLYNEUROPATHY ASSOCIATED WITH TYPE 2 DIABETES MELLITUS (HCC): Primary | ICD-10-CM

## 2025-05-22 DIAGNOSIS — L97.412 DIABETIC ULCER OF RIGHT MIDFOOT ASSOCIATED WITH TYPE 2 DIABETES MELLITUS, WITH FAT LAYER EXPOSED (HCC): ICD-10-CM

## 2025-05-22 DIAGNOSIS — E11.621 DIABETIC ULCER OF RIGHT MIDFOOT ASSOCIATED WITH TYPE 2 DIABETES MELLITUS, WITH FAT LAYER EXPOSED (HCC): ICD-10-CM

## 2025-05-22 DIAGNOSIS — E11.621 DIABETIC ULCER OF LEFT MIDFOOT ASSOCIATED WITH TYPE 2 DIABETES MELLITUS, WITH FAT LAYER EXPOSED (HCC): ICD-10-CM

## 2025-05-22 PROCEDURE — 11042 DBRDMT SUBQ TIS 1ST 20SQCM/<: CPT

## 2025-05-22 RX ORDER — SODIUM CHLOR/HYPOCHLOROUS ACID 0.033 %
SOLUTION, IRRIGATION IRRIGATION PRN
Status: DISCONTINUED | OUTPATIENT
Start: 2025-05-22 | End: 2025-05-23 | Stop reason: HOSPADM

## 2025-05-22 RX ORDER — MUPIROCIN 20 MG/G
OINTMENT TOPICAL PRN
OUTPATIENT
Start: 2025-05-22

## 2025-05-22 RX ORDER — CLOBETASOL PROPIONATE 0.5 MG/G
OINTMENT TOPICAL PRN
OUTPATIENT
Start: 2025-05-22

## 2025-05-22 RX ORDER — LIDOCAINE 50 MG/G
OINTMENT TOPICAL PRN
OUTPATIENT
Start: 2025-05-22

## 2025-05-22 RX ORDER — BACITRACIN ZINC AND POLYMYXIN B SULFATE 500; 1000 [USP'U]/G; [USP'U]/G
OINTMENT TOPICAL PRN
OUTPATIENT
Start: 2025-05-22

## 2025-05-22 RX ORDER — TRIAMCINOLONE ACETONIDE 1 MG/G
OINTMENT TOPICAL PRN
OUTPATIENT
Start: 2025-05-22

## 2025-05-22 RX ORDER — LIDOCAINE 40 MG/G
CREAM TOPICAL PRN
OUTPATIENT
Start: 2025-05-22

## 2025-05-22 RX ORDER — LIDOCAINE HYDROCHLORIDE 20 MG/ML
JELLY TOPICAL PRN
OUTPATIENT
Start: 2025-05-22

## 2025-05-22 RX ORDER — LIDOCAINE HYDROCHLORIDE 40 MG/ML
SOLUTION TOPICAL PRN
OUTPATIENT
Start: 2025-05-22

## 2025-05-22 RX ORDER — LIDOCAINE HYDROCHLORIDE 20 MG/ML
JELLY TOPICAL PRN
Status: DISCONTINUED | OUTPATIENT
Start: 2025-05-22 | End: 2025-05-23 | Stop reason: HOSPADM

## 2025-05-22 RX ORDER — BETAMETHASONE DIPROPIONATE 0.5 MG/G
CREAM TOPICAL PRN
OUTPATIENT
Start: 2025-05-22

## 2025-05-22 RX ORDER — GINSENG 100 MG
CAPSULE ORAL PRN
OUTPATIENT
Start: 2025-05-22

## 2025-05-22 RX ORDER — SILVER SULFADIAZINE 10 MG/G
CREAM TOPICAL PRN
OUTPATIENT
Start: 2025-05-22

## 2025-05-22 RX ORDER — NEOMYCIN/BACITRACIN/POLYMYXINB 3.5-400-5K
OINTMENT (GRAM) TOPICAL PRN
OUTPATIENT
Start: 2025-05-22

## 2025-05-22 RX ORDER — SODIUM CHLOR/HYPOCHLOROUS ACID 0.033 %
SOLUTION, IRRIGATION IRRIGATION PRN
OUTPATIENT
Start: 2025-05-22

## 2025-05-22 RX ORDER — GENTAMICIN SULFATE 1 MG/G
OINTMENT TOPICAL PRN
OUTPATIENT
Start: 2025-05-22

## 2025-05-22 RX ADMIN — LIDOCAINE HYDROCHLORIDE: 20 JELLY TOPICAL at 12:24

## 2025-05-22 RX ADMIN — Medication: at 18:20

## 2025-05-22 ASSESSMENT — PAIN SCALES - GENERAL: PAINLEVEL_OUTOF10: 5

## 2025-05-22 ASSESSMENT — PAIN DESCRIPTION - DESCRIPTORS: DESCRIPTORS: OTHER (COMMENT)

## 2025-05-22 ASSESSMENT — PAIN DESCRIPTION - LOCATION: LOCATION: FOOT

## 2025-05-22 ASSESSMENT — PAIN DESCRIPTION - ORIENTATION: ORIENTATION: RIGHT;LEFT

## 2025-05-22 NOTE — FLOWSHEET NOTE
Pre and Post Debridement Notes   05/22/25 1220   Right Leg Edema Point of Measurement   Heel to calf 44   Leg circumference 37 cm   Ankle circumference 22 cm   Foot circumference 24 cm   Compression Therapy 2 layer compression wrap   Left Leg Edema Point of Measurement   Heel to calf 44   Leg circumference 38 cm   Ankle circumference 23 cm   Foot circumference 25 cm   Compression Therapy 2 layer compression wrap   Wound 05/14/25 Foot Left;Dorsal #1   Date First Assessed/Time First Assessed: 05/14/25 0849   Present on Original Admission: Yes  Wound Approximate Age at First Assessment (Weeks): 12 weeks  Primary Wound Type: Neuropathic Ulcer  Location: Foot  Wound Location Orientation: Left;Dorsal  W...   Wound Image    Wound Etiology Diabetic Bishop 1   Dressing Status Clean;Dry;Intact   Wound Cleansed Cleansed with saline   Dressing/Treatment Hydrofera blue   Wound Length (cm) 0 cm   Wound Width (cm) 0 cm   Wound Depth (cm) 0 cm   Wound Surface Area (cm^2) 0 cm^2   Change in Wound Size % (l*w) 100   Wound Volume (cm^3) 0 cm^3   Wound Healing % 100   Wound Assessment Epithelialization;Pink/red   Drainage Amount None (dry)   Larisa-wound Assessment Intact   Wound 05/14/25 Foot Right;Plantar #2   Date First Assessed/Time First Assessed: 05/14/25 0850   Present on Original Admission: Yes  Wound Approximate Age at First Assessment (Weeks): 2 weeks  Primary Wound Type: Neuropathic Ulcer  Location: Foot  Wound Location Orientation: Right;Plantar  ...   Wound Image     Wound Etiology Diabetic Bishop 1   Dressing Status Old drainage noted   Wound Cleansed Cleansed with saline;Vashe;Soap and water   Dressing/Treatment Hydrofera blue   Wound Length (cm) 0.4 cm   Wound Width (cm) 0.4 cm   Wound Depth (cm) 0.1 cm   Wound Surface Area (cm^2) 0.16 cm^2   Change in Wound Size % (l*w) 67.35   Wound Volume (cm^3) 0.016 cm^3   Wound Healing % 67   Post-Procedure Length (cm) 0.4 cm   Post-Procedure Width (cm) 0.4 cm   Post-Procedure Depth

## 2025-05-22 NOTE — FLOWSHEET NOTE
Pre and Post Debridement Notes   05/22/25 1220   Right Leg Edema Point of Measurement   Heel to calf 44   Leg circumference 37 cm   Ankle circumference 22 cm   Foot circumference 24 cm   Compression Therapy 2 layer compression wrap   Left Leg Edema Point of Measurement   Heel to calf 44   Leg circumference 38 cm   Ankle circumference 23 cm   Foot circumference 25 cm   Compression Therapy 2 layer compression wrap   Wound 05/14/25 Foot Left;Dorsal #1   Date First Assessed/Time First Assessed: 05/14/25 0849   Present on Original Admission: Yes  Wound Approximate Age at First Assessment (Weeks): 12 weeks  Primary Wound Type: Neuropathic Ulcer  Location: Foot  Wound Location Orientation: Left;Dorsal  W...   Wound Image    Wound Etiology Neuropathic   Dressing Status Clean;Dry;Intact   Wound Cleansed Cleansed with saline   Dressing/Treatment Hydrofera blue   Wound Length (cm) 0 cm   Wound Width (cm) 0 cm   Wound Depth (cm) 0 cm   Wound Surface Area (cm^2) 0 cm^2   Change in Wound Size % (l*w) 100   Wound Volume (cm^3) 0 cm^3   Wound Healing % 100   Wound Assessment Epithelialization;Pink/red   Drainage Amount None (dry)   Larisa-wound Assessment Intact   Wound 05/14/25 Foot Right;Plantar #2   Date First Assessed/Time First Assessed: 05/14/25 0850   Present on Original Admission: Yes  Wound Approximate Age at First Assessment (Weeks): 2 weeks  Primary Wound Type: Neuropathic Ulcer  Location: Foot  Wound Location Orientation: Right;Plantar  ...   Wound Image     Wound Etiology Neuropathic   Dressing Status Old drainage noted   Wound Cleansed Cleansed with saline;Vashe;Soap and water   Dressing/Treatment Hydrofera blue   Wound Length (cm) 0.4 cm   Wound Width (cm) 0.4 cm   Wound Depth (cm) 0.1 cm   Wound Surface Area (cm^2) 0.16 cm^2   Change in Wound Size % (l*w) 67.35   Wound Volume (cm^3) 0.016 cm^3   Wound Healing % 67   Post-Procedure Length (cm) 0.4 cm   Post-Procedure Width (cm) 0.4 cm   Post-Procedure Depth (cm) 0.1 cm

## 2025-05-22 NOTE — WOUND CARE
Multilayer Compression Wrap   (Not Unna) Below the Knee    NAME:  Mustapha Naranjo  YOB: 1965  MEDICAL RECORD NUMBER:  [id number]  DATE:  5/22/2025    Multilayer compression wrap: Removed old Multilayer wrap if indicated and wash leg with mild soap/water.  Applied moisturizing agent to dry skin as needed.   Applied primary and secondary dressing as ordered.  Applied multilayered dressing below the knee to right lower leg.  Applied multilayered dressing below the knee to left lower leg.  Instructed patient/caregiver not to remove dressing and to keep it clean and dry.   Instructed patient/caregiver on complications to report to provider, such as pain, numbness in toes, heavy drainage, and slippage of dressing.  Instructed patient on purpose of compression dressing and on activity and exercise recommendations.      Electronically signed by Anuja Velazquez RN on 5/22/2025 at 6:23 PM  
SUPPLIES YOU HAVE AVAILABLE UNTIL YOU ARE ABLE TO REACH US. IT IS MOST IMPORTANT TO KEEP THE WOUND COVERED AT ALL TIMES.    Electronically signed Anuja Velazquez RN on 5/22/2025 at 12:13 PM

## 2025-05-22 NOTE — DISCHARGE INSTRUCTIONS
Right and Left Foot Wounds:  Cleanse wounds with Normal Saline  Vashe post debridement and with dressing changes  Hydrofera Ready: Cut to wound size, place in wound bed, shiny side out.   Cover with ABD or Bordered Foam.  Wrap bilateral lower legs with 2 layer compression wraps.  Dressing change 1x weekly in wound clinic    Left Dorsal Foot wound resolved  Cover fragile new skin with Xeroform under Compression Wrap.     Do not cut compression wraps off. If wraps become too loose or slide down, call wound center to make an appointment for dressing change. If wraps become too tight, try elevating your legs to assist fluid drainage.  Elevate legs when sitting.  Avoid prolonged standing or sitting with legs in dependent position.  Be cautious of increased salt intake as this promotes fluid retention and swelling.  Increase protein intake to promote wound healing. Mian and Ensure are examples of protein supplements.  Do not get wound wet in shower, pool or tub. May purchase cast cover at local pharmacy to keep dry in shower.    [facility] [employer] for Velcro Compression Wrap System    Right Calf 37 cm  Right Ankle 22 cm  Right Foot 24 cm  Left Calf 38 cm  Left Ankle 23 cm  Left Foot 25 cm    Right and Left Lower Leg 44cm length

## 2025-05-30 PROBLEM — L97.422 DIABETIC ULCER OF LEFT MIDFOOT ASSOCIATED WITH TYPE 2 DIABETES MELLITUS, WITH FAT LAYER EXPOSED (HCC): Chronic | Status: ACTIVE | Noted: 2025-05-19

## 2025-05-30 PROBLEM — E11.621 DIABETIC ULCER OF LEFT MIDFOOT ASSOCIATED WITH TYPE 2 DIABETES MELLITUS, WITH FAT LAYER EXPOSED (HCC): Chronic | Status: ACTIVE | Noted: 2025-05-19

## 2025-05-30 PROBLEM — L97.412 DIABETIC ULCER OF RIGHT MIDFOOT ASSOCIATED WITH TYPE 2 DIABETES MELLITUS, WITH FAT LAYER EXPOSED (HCC): Chronic | Status: ACTIVE | Noted: 2025-05-19

## 2025-05-30 PROBLEM — E11.621 DIABETIC ULCER OF RIGHT MIDFOOT ASSOCIATED WITH TYPE 2 DIABETES MELLITUS, WITH FAT LAYER EXPOSED (HCC): Chronic | Status: ACTIVE | Noted: 2025-05-19

## 2025-06-02 ENCOUNTER — HOSPITAL ENCOUNTER (OUTPATIENT)
Dept: WOUND CARE | Age: 60
Discharge: HOME | End: 2025-06-02
Payer: COMMERCIAL

## 2025-06-02 VITALS
HEART RATE: 94 BPM | RESPIRATION RATE: 16 BRPM | DIASTOLIC BLOOD PRESSURE: 58 MMHG | TEMPERATURE: 98 F | SYSTOLIC BLOOD PRESSURE: 91 MMHG

## 2025-06-02 DIAGNOSIS — L03.116 CELLULITIS OF LEFT FOOT: ICD-10-CM

## 2025-06-02 DIAGNOSIS — E11.621 DIABETIC ULCER OF RIGHT MIDFOOT ASSOCIATED WITH TYPE 2 DIABETES MELLITUS, WITH FAT LAYER EXPOSED (HCC): ICD-10-CM

## 2025-06-02 DIAGNOSIS — E11.42 DIABETIC POLYNEUROPATHY ASSOCIATED WITH TYPE 2 DIABETES MELLITUS (HCC): Primary | ICD-10-CM

## 2025-06-02 DIAGNOSIS — L97.422 DIABETIC ULCER OF LEFT MIDFOOT ASSOCIATED WITH TYPE 2 DIABETES MELLITUS, WITH FAT LAYER EXPOSED (HCC): ICD-10-CM

## 2025-06-02 DIAGNOSIS — L97.412 DIABETIC ULCER OF RIGHT MIDFOOT ASSOCIATED WITH TYPE 2 DIABETES MELLITUS, WITH FAT LAYER EXPOSED (HCC): ICD-10-CM

## 2025-06-02 DIAGNOSIS — E11.621 DIABETIC ULCER OF LEFT MIDFOOT ASSOCIATED WITH TYPE 2 DIABETES MELLITUS, WITH FAT LAYER EXPOSED (HCC): ICD-10-CM

## 2025-06-02 PROCEDURE — 11042 DBRDMT SUBQ TIS 1ST 20SQCM/<: CPT

## 2025-06-02 PROCEDURE — 87176 TISSUE HOMOGENIZATION CULTR: CPT

## 2025-06-02 PROCEDURE — 87077 CULTURE AEROBIC IDENTIFY: CPT

## 2025-06-02 PROCEDURE — 87070 CULTURE OTHR SPECIMN AEROBIC: CPT

## 2025-06-02 PROCEDURE — 87075 CULTR BACTERIA EXCEPT BLOOD: CPT

## 2025-06-02 PROCEDURE — 87205 SMEAR GRAM STAIN: CPT

## 2025-06-02 PROCEDURE — 87186 SC STD MICRODIL/AGAR DIL: CPT

## 2025-06-02 RX ORDER — BACITRACIN ZINC AND POLYMYXIN B SULFATE 500; 1000 [USP'U]/G; [USP'U]/G
OINTMENT TOPICAL PRN
OUTPATIENT
Start: 2025-06-02

## 2025-06-02 RX ORDER — SODIUM CHLOR/HYPOCHLOROUS ACID 0.033 %
SOLUTION, IRRIGATION IRRIGATION PRN
OUTPATIENT
Start: 2025-06-02

## 2025-06-02 RX ORDER — LIDOCAINE HYDROCHLORIDE 40 MG/ML
SOLUTION TOPICAL PRN
OUTPATIENT
Start: 2025-06-02

## 2025-06-02 RX ORDER — MUPIROCIN 20 MG/G
OINTMENT TOPICAL PRN
OUTPATIENT
Start: 2025-06-02

## 2025-06-02 RX ORDER — GINSENG 100 MG
CAPSULE ORAL PRN
OUTPATIENT
Start: 2025-06-02

## 2025-06-02 RX ORDER — NEOMYCIN/BACITRACIN/POLYMYXINB 3.5-400-5K
OINTMENT (GRAM) TOPICAL PRN
OUTPATIENT
Start: 2025-06-02

## 2025-06-02 RX ORDER — SILVER SULFADIAZINE 10 MG/G
CREAM TOPICAL PRN
OUTPATIENT
Start: 2025-06-02

## 2025-06-02 RX ORDER — LIDOCAINE HYDROCHLORIDE 20 MG/ML
JELLY TOPICAL PRN
Status: DISCONTINUED | OUTPATIENT
Start: 2025-06-02 | End: 2025-06-03 | Stop reason: HOSPADM

## 2025-06-02 RX ORDER — LIDOCAINE HYDROCHLORIDE 20 MG/ML
JELLY TOPICAL PRN
OUTPATIENT
Start: 2025-06-02

## 2025-06-02 RX ORDER — LIDOCAINE 40 MG/G
CREAM TOPICAL PRN
OUTPATIENT
Start: 2025-06-02

## 2025-06-02 RX ORDER — CEFADROXIL 500 MG/1
500 CAPSULE ORAL 2 TIMES DAILY
Qty: 20 CAPSULE | Refills: 0 | Status: SHIPPED | OUTPATIENT
Start: 2025-06-02 | End: 2025-06-12

## 2025-06-02 RX ORDER — BETAMETHASONE DIPROPIONATE 0.5 MG/G
CREAM TOPICAL PRN
OUTPATIENT
Start: 2025-06-02

## 2025-06-02 RX ORDER — TRIAMCINOLONE ACETONIDE 1 MG/G
OINTMENT TOPICAL PRN
OUTPATIENT
Start: 2025-06-02

## 2025-06-02 RX ORDER — CLOBETASOL PROPIONATE 0.5 MG/G
OINTMENT TOPICAL PRN
OUTPATIENT
Start: 2025-06-02

## 2025-06-02 RX ORDER — GENTAMICIN SULFATE 1 MG/G
OINTMENT TOPICAL PRN
OUTPATIENT
Start: 2025-06-02

## 2025-06-02 RX ORDER — LIDOCAINE 50 MG/G
OINTMENT TOPICAL PRN
OUTPATIENT
Start: 2025-06-02

## 2025-06-02 RX ADMIN — LIDOCAINE HYDROCHLORIDE: 20 JELLY TOPICAL at 12:03

## 2025-06-02 NOTE — WOUND CARE
Discharge Instructions for  [facility]  [address]  [address]  [place] [address]  Phone [phone]   Fax [phone]      NAME:  Mustapha Naranjo  YOB: 1965  MEDICAL RECORD NUMBER:  [id number]  DATE:  6/2/2025    Return Appointment:   1 week with Aleyda Mcginnis NP    Instructions:   Right and Left Foot Wounds:  Cleanse wounds with Normal Saline  Vashe post debridement and with dressing changes  Hydrofera Ready: Cut to wound size, place in wound bed, shiny side out.   Cover with ABD or Bordered Foam.  Wrap right lower leg with 2 layer compression wraps.  Double layer tubigrip to left lower leg.  Change left foot dressing on Thursday.    Dressing change 1x weekly in wound clinic    Left Dorsal Foot wound resolved  Cover fragile new skin with Xeroform under Compression Wrap.    Antibiotic, Duricef ordered today.   and start taking today.     Do not cut compression wraps off. If wraps become too loose or slide down, call wound center to make an appointment for dressing change. If wraps become too tight, try elevating your legs to assist fluid drainage.  Elevate legs when sitting.  Avoid prolonged standing or sitting with legs in dependent position.  Be cautious of increased salt intake as this promotes fluid retention and swelling.  Increase protein intake to promote wound healing. Mian and Ensure are examples of protein supplements.  Do not get wound wet in shower, pool or tub. May purchase cast cover at local pharmacy to keep dry in shower.    Would culture obtained today. Results can take up to 5 days.     [facility] [employer] for Velcro Compression Wrap System    Right Calf 37 cm  Right Ankle 22 cm  Right Foot 24 cm  Left Calf 38 cm  Left Ankle 23 cm  Left Foot 25 cm    Right and Left Lower Leg 44cm length    Should you experience increased redness, swelling, pain, foul odor, size of wound(s), or have a temperature over 101 degrees please contact the Wound

## 2025-06-02 NOTE — ASSESSMENT & PLAN NOTE
Assessment  Left lateral 5th MTPJ wound - wound bed is red, non granular, with thin layer slough, negative probe to bone  Left 5th toe and MTPJ are erythematous, edematous and warm to touch  No fever, chills, body aches; BP is soft which patient states is normal for him, HR, RR, and temp are stable  Plan  Excisional debridement to remove devitalized tissue and promote wound healing    Tissue culture taken; will start Duricef 500 mg BID pending culture result  Wound care: Vashe cleanse, Hydrofera blue, double layer tubigrip; change 3x weekly  Infection precautions given  RTC 1 week

## 2025-06-02 NOTE — ASSESSMENT & PLAN NOTE
Assessment  Right lateral 5th MTPJ wound - wound is small, wound bed is pink with thin layer biofilm/slough, negative probe to bone  Plan  Excisional debridement to remove devitalized tissue and promote wound healing    Wound care: Vashe cleanse, Hydrofera blue, multilayer compression, change weekly in clinic  RTC 1 week

## 2025-06-02 NOTE — FLOWSHEET NOTE
Dressing/Treatment Hydrofera blue   Wound Length (cm) 1.4 cm   Wound Width (cm) 1.8 cm   Wound Depth (cm) 0.1 cm   Wound Surface Area (cm^2) 2.52 cm^2   Change in Wound Size % (l*w) 31.52   Wound Volume (cm^3) 0.252 cm^3   Wound Healing % 32   Post-Procedure Length (cm) 1.4 cm   Post-Procedure Width (cm) 1.8 cm   Post-Procedure Depth (cm) 0.1 cm   Post-Procedure Surface Area (cm^2) 2.52 cm^2   Post-Procedure Volume (cm^3) 0.252 cm^3   Wound Assessment [place]/red;Slough   Drainage Amount Small (< 25%)   Drainage Description Serosanguinous   Odor None   Larisa-wound Assessment Hyperkeratosis (callous);Edematous   Wound Thickness Description not for Pressure Injury Full thickness   Pain Assessment   Pain Assessment None - Denies Pain

## 2025-06-02 NOTE — WOUND CARE
Multilayer Compression Wrap   (Not Unna) Below the Knee    NAME:  Mustapha Naranjo  YOB: 1965  MEDICAL RECORD NUMBER:  [id number]  DATE:  6/2/2025    Multilayer compression wrap: Removed old Multilayer wrap if indicated and wash leg with mild soap/water.  Applied moisturizing agent to dry skin as needed.   Applied primary and secondary dressing as ordered.  Applied multilayered dressing below the knee to right lower leg.  Instructed patient/caregiver not to remove dressing and to keep it clean and dry.   Instructed patient/caregiver on complications to report to provider, such as pain, numbness in toes, heavy drainage, and slippage of dressing.  Instructed patient on purpose of compression dressing and on activity and exercise recommendations.      Electronically signed by Martha Merrill RN on 6/2/2025 at 12:23 PM

## 2025-06-02 NOTE — PROGRESS NOTES
[facility]  Procedure Note    Mustapha Naranjo  MEDICAL RECORD NUMBER: [id number]  AGE: 60 y.o.     GENDER: male    : 1965  EPISODE DATE: 2025    Problem List Items Addressed This Visit          Endocrine    * (Principal) Diabetic ulcer of left midfoot associated with type 2 diabetes mellitus, with fat layer exposed (HCC) (Chronic)    Assessment  Left lateral 5th MTPJ wound - wound bed is red, non granular, with thin layer slough, negative probe to bone  Left 5th toe and MTPJ are erythematous, edematous and warm to touch  No fever, chills, body aches; BP is soft which patient states is normal for him, HR, RR, and temp are stable  Plan  Excisional debridement to remove devitalized tissue and promote wound healing    Tissue culture taken; will start Duricef 500 mg BID pending culture result  Wound care: Vashe cleanse, Hydrofera blue, double layer tubigrip; change 3x weekly  Infection precautions given  RTC 1 week         Relevant Medications    lidocaine (XYLOCAINE) 2 % jelly    Other Relevant Orders    MD COMMUNICATION 1    MD COMMUNICATION 2    Initiate Outpatient Wound Care Protocol    Initiate Oxygen Therapy Protocol    Diabetic ulcer of right midfoot associated with type 2 diabetes mellitus, with fat layer exposed (HCC) (Chronic)    Assessment  Right lateral 5th MTPJ wound - wound is small, wound bed is pink with thin layer biofilm/slough, negative probe to bone  Plan  Excisional debridement to remove devitalized tissue and promote wound healing    Wound care: Vashe cleanse, Hydrofera blue, multilayer compression, change weekly in clinic  RTC 1 week         Relevant Medications    lidocaine (XYLOCAINE) 2 % jelly    Other Relevant Orders    MD COMMUNICATION 1    MD COMMUNICATION 2    Initiate Outpatient Wound Care Protocol    Initiate Oxygen Therapy Protocol    Diabetic polyneuropathy associated with type 2 diabetes mellitus (HCC) - Primary    Relevant  Per in home PT CHERISE patient continues to complain of cough and congestion. Labs due to be drawn. Patient not answering phone. Mucinex prescribed.

## 2025-06-05 LAB
BACTERIA SPEC CULT: ABNORMAL
GRAM STN SPEC: ABNORMAL
GRAM STN SPEC: ABNORMAL
SERVICE CMNT-IMP: ABNORMAL

## 2025-06-06 LAB
BACTERIA SPEC CULT: NORMAL
SERVICE CMNT-IMP: NORMAL

## 2025-06-10 ENCOUNTER — HOSPITAL ENCOUNTER (OUTPATIENT)
Dept: WOUND CARE | Age: 60
Discharge: HOME | End: 2025-06-10
Payer: COMMERCIAL

## 2025-06-10 VITALS
TEMPERATURE: 98.6 F | RESPIRATION RATE: 18 BRPM | HEART RATE: 90 BPM | DIASTOLIC BLOOD PRESSURE: 55 MMHG | SYSTOLIC BLOOD PRESSURE: 106 MMHG | OXYGEN SATURATION: 100 %

## 2025-06-10 DIAGNOSIS — E11.621 DIABETIC ULCER OF RIGHT MIDFOOT ASSOCIATED WITH TYPE 2 DIABETES MELLITUS, WITH FAT LAYER EXPOSED (HCC): Chronic | ICD-10-CM

## 2025-06-10 DIAGNOSIS — L97.422 DIABETIC ULCER OF LEFT MIDFOOT ASSOCIATED WITH TYPE 2 DIABETES MELLITUS, WITH FAT LAYER EXPOSED (HCC): Primary | Chronic | ICD-10-CM

## 2025-06-10 DIAGNOSIS — E11.42 DIABETIC POLYNEUROPATHY ASSOCIATED WITH TYPE 2 DIABETES MELLITUS (HCC): ICD-10-CM

## 2025-06-10 DIAGNOSIS — R60.0 LOWER EXTREMITY EDEMA: ICD-10-CM

## 2025-06-10 DIAGNOSIS — L97.412 DIABETIC ULCER OF RIGHT MIDFOOT ASSOCIATED WITH TYPE 2 DIABETES MELLITUS, WITH FAT LAYER EXPOSED (HCC): Chronic | ICD-10-CM

## 2025-06-10 DIAGNOSIS — E11.621 DIABETIC ULCER OF LEFT MIDFOOT ASSOCIATED WITH TYPE 2 DIABETES MELLITUS, WITH FAT LAYER EXPOSED (HCC): Primary | Chronic | ICD-10-CM

## 2025-06-10 PROCEDURE — 11042 DBRDMT SUBQ TIS 1ST 20SQCM/<: CPT

## 2025-06-10 PROCEDURE — 11055 PARING/CUTG B9 HYPRKER LES 1: CPT

## 2025-06-10 RX ORDER — CLOBETASOL PROPIONATE 0.5 MG/G
OINTMENT TOPICAL PRN
OUTPATIENT
Start: 2025-06-10

## 2025-06-10 RX ORDER — LIDOCAINE HYDROCHLORIDE 20 MG/ML
JELLY TOPICAL PRN
OUTPATIENT
Start: 2025-06-10

## 2025-06-10 RX ORDER — LIDOCAINE 40 MG/G
CREAM TOPICAL PRN
OUTPATIENT
Start: 2025-06-10

## 2025-06-10 RX ORDER — NEOMYCIN/BACITRACIN/POLYMYXINB 3.5-400-5K
OINTMENT (GRAM) TOPICAL PRN
OUTPATIENT
Start: 2025-06-10

## 2025-06-10 RX ORDER — SILVER SULFADIAZINE 10 MG/G
CREAM TOPICAL PRN
OUTPATIENT
Start: 2025-06-10

## 2025-06-10 RX ORDER — MUPIROCIN 2 %
OINTMENT (GRAM) TOPICAL PRN
OUTPATIENT
Start: 2025-06-10

## 2025-06-10 RX ORDER — BACITRACIN ZINC AND POLYMYXIN B SULFATE 500; 1000 [USP'U]/G; [USP'U]/G
OINTMENT TOPICAL PRN
OUTPATIENT
Start: 2025-06-10

## 2025-06-10 RX ORDER — LIDOCAINE HYDROCHLORIDE 40 MG/ML
SOLUTION TOPICAL PRN
OUTPATIENT
Start: 2025-06-10

## 2025-06-10 RX ORDER — LIDOCAINE 50 MG/G
OINTMENT TOPICAL PRN
OUTPATIENT
Start: 2025-06-10

## 2025-06-10 RX ORDER — GENTAMICIN SULFATE 1 MG/G
OINTMENT TOPICAL PRN
OUTPATIENT
Start: 2025-06-10

## 2025-06-10 RX ORDER — BETAMETHASONE DIPROPIONATE 0.5 MG/G
CREAM TOPICAL PRN
OUTPATIENT
Start: 2025-06-10

## 2025-06-10 RX ORDER — TRIAMCINOLONE ACETONIDE 1 MG/G
OINTMENT TOPICAL PRN
OUTPATIENT
Start: 2025-06-10

## 2025-06-10 RX ORDER — SODIUM CHLOR/HYPOCHLOROUS ACID 0.033 %
SOLUTION, IRRIGATION IRRIGATION PRN
OUTPATIENT
Start: 2025-06-10

## 2025-06-10 RX ORDER — GINSENG 100 MG
CAPSULE ORAL PRN
OUTPATIENT
Start: 2025-06-10

## 2025-06-10 ASSESSMENT — PAIN SCALES - GENERAL: PAINLEVEL_OUTOF10: 6

## 2025-06-10 ASSESSMENT — PAIN DESCRIPTION - ORIENTATION: ORIENTATION: LEFT

## 2025-06-10 ASSESSMENT — PAIN DESCRIPTION - DESCRIPTORS: DESCRIPTORS: ACHING

## 2025-06-10 ASSESSMENT — PAIN DESCRIPTION - LOCATION: LOCATION: LEG

## 2025-06-10 NOTE — WOUND CARE
Multilayer Compression Wrap   (Not Unna) Below the Knee    NAME:  Mustapha Naranjo  YOB: 1965  MEDICAL RECORD NUMBER:  [id number]  DATE:  6/10/2025    Multilayer compression wrap: Removed old Multilayer wrap if indicated and wash leg with mild soap/water.  Applied moisturizing agent to dry skin as needed.   Applied primary and secondary dressing as ordered.  Applied multilayered dressing below the knee to left lower leg.  Instructed patient/caregiver not to remove dressing and to keep it clean and dry.   Instructed patient/caregiver on complications to report to provider, such as pain, numbness in toes, heavy drainage, and slippage of dressing.  Instructed patient on purpose of compression dressing and on activity and exercise recommendations.      Electronically signed by RENÉ WHITE RN on 6/10/2025 at 3:42 PM    
SUPPLIES YOU HAVE AVAILABLE UNTIL YOU ARE ABLE TO REACH US. IT IS MOST IMPORTANT TO KEEP THE WOUND COVERED AT ALL TIMES.    Electronically signed RENÉ WHITE RN on 6/10/2025 at 3:07 PM

## 2025-06-11 PROBLEM — E11.42 DIABETIC POLYNEUROPATHY ASSOCIATED WITH TYPE 2 DIABETES MELLITUS (HCC): Chronic | Status: ACTIVE | Noted: 2025-05-19

## 2025-06-11 PROBLEM — R60.0 LOWER EXTREMITY EDEMA: Chronic | Status: ACTIVE | Noted: 2025-05-19

## 2025-06-11 LAB
BACTERIA SPEC CULT: NORMAL
SERVICE CMNT-IMP: NORMAL

## 2025-06-11 NOTE — ASSESSMENT & PLAN NOTE
Assessment  Left lateral 5th MTPJ wound is smaller, wound bed is red, non granular, with thin layer slough, negative probe to bone  Culture from 6/2 grew klebsiella and pseudomonas, treated with Duricef, EOT 6/12/25  Edema and erythema have resolved  Left foot and LE remain edematous, patient has not been wearing compression over foot due to it sliding up when he dons his shoes  Bedside NELI today in clinic: right NELI 1.21, left NELI 1.21  Plan  Excisional debridement to remove devitalized tissue and promote wound healing    Continue abx to EOT  Wound care: Vashe cleanse, Hydrofera blue, multilayer compression; change weekly  RTC 1 week

## 2025-06-11 NOTE — ASSESSMENT & PLAN NOTE
Assessment  Right lateral 5th MTPJ wound is callus covered with no periwound edema or erythema  RLE edema is fairly well managed  Plan  Callus pared, no open wound noted underneath   Moisturize daily with emollient of choice (Vaseline, Aquaphor, etc.)   Recommend daily use of: OTC compression stockings; replace every 3 months

## 2025-06-17 ENCOUNTER — HOSPITAL ENCOUNTER (OUTPATIENT)
Dept: WOUND CARE | Age: 60
Discharge: HOME | End: 2025-06-17
Payer: COMMERCIAL

## 2025-06-17 VITALS
TEMPERATURE: 97.9 F | OXYGEN SATURATION: 98 % | HEART RATE: 80 BPM | DIASTOLIC BLOOD PRESSURE: 67 MMHG | SYSTOLIC BLOOD PRESSURE: 100 MMHG | RESPIRATION RATE: 18 BRPM

## 2025-06-17 DIAGNOSIS — L97.422 DIABETIC ULCER OF LEFT MIDFOOT ASSOCIATED WITH TYPE 2 DIABETES MELLITUS, WITH FAT LAYER EXPOSED (HCC): Primary | Chronic | ICD-10-CM

## 2025-06-17 DIAGNOSIS — L97.412 DIABETIC ULCER OF RIGHT MIDFOOT ASSOCIATED WITH TYPE 2 DIABETES MELLITUS, WITH FAT LAYER EXPOSED (HCC): Chronic | ICD-10-CM

## 2025-06-17 DIAGNOSIS — E11.42 DIABETIC POLYNEUROPATHY ASSOCIATED WITH TYPE 2 DIABETES MELLITUS (HCC): Chronic | ICD-10-CM

## 2025-06-17 DIAGNOSIS — E11.621 DIABETIC ULCER OF RIGHT MIDFOOT ASSOCIATED WITH TYPE 2 DIABETES MELLITUS, WITH FAT LAYER EXPOSED (HCC): Chronic | ICD-10-CM

## 2025-06-17 DIAGNOSIS — E11.621 DIABETIC ULCER OF LEFT MIDFOOT ASSOCIATED WITH TYPE 2 DIABETES MELLITUS, WITH FAT LAYER EXPOSED (HCC): Primary | Chronic | ICD-10-CM

## 2025-06-17 PROCEDURE — 11042 DBRDMT SUBQ TIS 1ST 20SQCM/<: CPT

## 2025-06-17 RX ORDER — BACITRACIN ZINC AND POLYMYXIN B SULFATE 500; 1000 [USP'U]/G; [USP'U]/G
OINTMENT TOPICAL PRN
OUTPATIENT
Start: 2025-06-17

## 2025-06-17 RX ORDER — NEOMYCIN/BACITRACIN/POLYMYXINB 3.5-400-5K
OINTMENT (GRAM) TOPICAL PRN
OUTPATIENT
Start: 2025-06-17

## 2025-06-17 RX ORDER — BETAMETHASONE DIPROPIONATE 0.5 MG/G
CREAM TOPICAL PRN
OUTPATIENT
Start: 2025-06-17

## 2025-06-17 RX ORDER — LIDOCAINE 40 MG/G
CREAM TOPICAL PRN
OUTPATIENT
Start: 2025-06-17

## 2025-06-17 RX ORDER — LIDOCAINE HYDROCHLORIDE 20 MG/ML
JELLY TOPICAL PRN
OUTPATIENT
Start: 2025-06-17

## 2025-06-17 RX ORDER — MUPIROCIN 2 %
OINTMENT (GRAM) TOPICAL PRN
OUTPATIENT
Start: 2025-06-17

## 2025-06-17 RX ORDER — LIDOCAINE HYDROCHLORIDE 20 MG/ML
JELLY TOPICAL PRN
Status: DISCONTINUED | OUTPATIENT
Start: 2025-06-17 | End: 2025-06-18 | Stop reason: HOSPADM

## 2025-06-17 RX ORDER — SODIUM CHLOR/HYPOCHLOROUS ACID 0.033 %
SOLUTION, IRRIGATION IRRIGATION PRN
OUTPATIENT
Start: 2025-06-17

## 2025-06-17 RX ORDER — GINSENG 100 MG
CAPSULE ORAL PRN
OUTPATIENT
Start: 2025-06-17

## 2025-06-17 RX ORDER — SILVER SULFADIAZINE 10 MG/G
CREAM TOPICAL PRN
OUTPATIENT
Start: 2025-06-17

## 2025-06-17 RX ORDER — CLOBETASOL PROPIONATE 0.5 MG/G
OINTMENT TOPICAL PRN
OUTPATIENT
Start: 2025-06-17

## 2025-06-17 RX ORDER — GENTAMICIN SULFATE 1 MG/G
OINTMENT TOPICAL PRN
OUTPATIENT
Start: 2025-06-17

## 2025-06-17 RX ORDER — LIDOCAINE HYDROCHLORIDE 40 MG/ML
SOLUTION TOPICAL PRN
OUTPATIENT
Start: 2025-06-17

## 2025-06-17 RX ORDER — LIDOCAINE 50 MG/G
OINTMENT TOPICAL PRN
OUTPATIENT
Start: 2025-06-17

## 2025-06-17 RX ORDER — TRIAMCINOLONE ACETONIDE 1 MG/G
OINTMENT TOPICAL PRN
OUTPATIENT
Start: 2025-06-17

## 2025-06-17 RX ADMIN — LIDOCAINE HYDROCHLORIDE: 20 JELLY TOPICAL at 15:33

## 2025-06-17 ASSESSMENT — PAIN SCALES - GENERAL: PAINLEVEL_OUTOF10: 5

## 2025-06-17 NOTE — WOUND CARE
Discharge Instructions for  [facility]  [address]  [address]  [place] [address]  Phone [phone]   Fax [phone]      NAME:  Mustapha Naranjo  YOB: 1965  MEDICAL RECORD NUMBER:  [id number]  DATE:  6/17/2025    Return Appointment:   1 week with Aleyda Mcginnis NP    Instructions:   Left plantar Foot:  Cleanse wounds with Normal Saline  Vashe post debridement and with dressing changes  Hydrofera Ready: Cut to wound size, place in wound bed, shiny side out.   Cover with ABD or Bordered Foam.  Wrap left lower leg with 2 layer compression wraps.  Change weekly in office    Right foot:  Cleanse with normal saline  Allow Vashe Wound Solution moistened gauze to sit on wound bed for 60 seconds  Apply Xeroform   Cover with foam dressing  Change daily  Wear velcro compression wrap daily    Left Dorsal Foot wound resolved  Continue taking antibiotic until complete.   BEDSIDE ABIs completed: L: 1.21 R: 1.21     Do not cut compression wraps off. If wraps become too loose or slide down, call wound center to make an appointment for dressing change. If wraps become too tight, try elevating your legs to assist fluid drainage.  Elevate legs when sitting.  Avoid prolonged standing or sitting with legs in dependent position.  Be cautious of increased salt intake as this promotes fluid retention and swelling.  Increase protein intake to promote wound healing. Mian and Ensure are examples of protein supplements.  Do not get wound wet in shower, pool or tub. May purchase cast cover at local pharmacy to keep dry in shower.      Should you experience increased redness, swelling, pain, foul odor, size of wound(s), or have a temperature over 101 degrees please contact the Wound Healing Center at [phone] or if after hours contact your primary care physician or go to the hospital emergency department.    PLEASE NOTE: IF YOU ARE UNABLE TO OBTAIN WOUND SUPPLIES, CONTINUE TO USE THE

## 2025-06-17 NOTE — WOUND CARE
Multilayer Compression Wrap   (Not Unna) Below the Knee    NAME:  Mustapha Naranjo  YOB: 1965  MEDICAL RECORD NUMBER:  [id number]  DATE:  6/17/2025    Multilayer compression wrap: Removed old Multilayer wrap if indicated and wash leg with mild soap/water.  Applied moisturizing agent to dry skin as needed.   Applied primary and secondary dressing as ordered.  Applied multilayered dressing below the knee to left lower leg.  Instructed patient/caregiver not to remove dressing and to keep it clean and dry.   Instructed patient/caregiver on complications to report to provider, such as pain, numbness in toes, heavy drainage, and slippage of dressing.  Instructed patient on purpose of compression dressing and on activity and exercise recommendations.      Electronically signed by Bari Centeno RN on 6/17/2025 at 4:19 PM

## 2025-06-17 NOTE — FLOWSHEET NOTE
06/17/25 1512   Right Leg Edema Point of Measurement   Leg circumference 34 cm   Ankle circumference 23 cm   Foot circumference 26 cm   Compression Therapy Compression stockings   Left Leg Edema Point of Measurement   Leg circumference 33 cm   Ankle circumference 25 cm   Foot circumference 26 cm   Compression Therapy 2 layer compression wrap   Wound 05/14/25 Foot Left;Dorsal #1   Date First Assessed/Time First Assessed: 05/14/25 0849   Present on Original Admission: Yes  Wound Approximate Age at First Assessment (Weeks): 12 weeks  Primary Wound Type: Neuropathic Ulcer  Location: Foot  Wound Location Orientation: Left;Dorsal  W...   Wound Image    Wound Etiology Diabetic Bishop 1   Dressing Status Intact   Wound Cleansed Soap and water   Dressing/Treatment Xeroform   Offloading for Diabetic Foot Ulcers Offloading ordered;Other (comment)  (wheelchair)   Wound Length (cm) 0 cm   Wound Width (cm) 0 cm   Wound Depth (cm) 0 cm   Wound Surface Area (cm^2) 0 cm^2   Change in Wound Size % (l*w) 100   Wound Volume (cm^3) 0 cm^3   Wound Healing % 100   Wound Assessment Pink/red   Drainage Amount None (dry)   Odor None   Larisa-wound Assessment Intact   Margins Attached edges   Wound Thickness Description not for Pressure Injury Partial thickness   Wound 05/14/25 Foot Right;Plantar #2   Date First Assessed/Time First Assessed: 05/14/25 0850   Present on Original Admission: Yes  Wound Approximate Age at First Assessment (Weeks): 2 weeks  Primary Wound Type: Neuropathic Ulcer  Location: Foot  Wound Location Orientation: Right;Plantar  ...   Wound Image    Wound Etiology Diabetic Bishop 1   Dressing Status Old drainage noted   Wound Cleansed Soap and water   Dressing/Treatment Hydrofera blue   Offloading for Diabetic Foot Ulcers Other (comment)  (wheelchair)   Wound Length (cm) 0 cm   Wound Width (cm) 0 cm   Wound Depth (cm) 0 cm   Wound Surface Area (cm^2) 0 cm^2   Change in Wound Size % (l*w) 100   Wound Volume (cm^3) 0 cm^3

## 2025-06-17 NOTE — DISCHARGE INSTRUCTIONS
Instructions:   Left plantar Foot:  Cleanse wounds with Normal Saline  Vashe post debridement and with dressing changes  Hydrofera Ready: Cut to wound size, place in wound bed, shiny side out.   Cover with ABD or Bordered Foam.  Wrap left lower leg with 2 layer compression wraps.  Change weekly in office     Right foot:  Cleanse with normal saline  Allow Vashe Wound Solution moistened gauze to sit on wound bed for 60 seconds  Apply Xeroform   Cover with foam dressing  Change daily  Wear velcro compression wrap daily

## 2025-06-23 ENCOUNTER — TELEPHONE (OUTPATIENT)
Dept: WOUND CARE | Age: 60
End: 2025-06-23

## 2025-06-23 DIAGNOSIS — E11.621 DIABETIC ULCER OF RIGHT MIDFOOT ASSOCIATED WITH TYPE 2 DIABETES MELLITUS, WITH FAT LAYER EXPOSED (HCC): Chronic | ICD-10-CM

## 2025-06-23 DIAGNOSIS — E11.621 DIABETIC ULCER OF LEFT MIDFOOT ASSOCIATED WITH TYPE 2 DIABETES MELLITUS, WITH FAT LAYER EXPOSED (HCC): Primary | Chronic | ICD-10-CM

## 2025-06-23 DIAGNOSIS — L97.422 DIABETIC ULCER OF LEFT MIDFOOT ASSOCIATED WITH TYPE 2 DIABETES MELLITUS, WITH FAT LAYER EXPOSED (HCC): Primary | Chronic | ICD-10-CM

## 2025-06-23 DIAGNOSIS — E11.42 DIABETIC POLYNEUROPATHY ASSOCIATED WITH TYPE 2 DIABETES MELLITUS (HCC): Chronic | ICD-10-CM

## 2025-06-23 DIAGNOSIS — L97.412 DIABETIC ULCER OF RIGHT MIDFOOT ASSOCIATED WITH TYPE 2 DIABETES MELLITUS, WITH FAT LAYER EXPOSED (HCC): Chronic | ICD-10-CM

## 2025-06-23 NOTE — TELEPHONE ENCOUNTER
Patient called and stated that the wound on his left foot was draining more with increased pain. He states he has finished his antibiotic and has not been running any fevers. He has an appointment for tomorrow at the wound center but instructed patient to go to the ER if he started running fever, had increased pain, or drainage worsened. Patient voiced understanding.

## 2025-06-24 ENCOUNTER — HOSPITAL ENCOUNTER (OUTPATIENT)
Dept: WOUND CARE | Age: 60
Discharge: HOME | End: 2025-06-24
Payer: COMMERCIAL

## 2025-06-24 VITALS
DIASTOLIC BLOOD PRESSURE: 70 MMHG | TEMPERATURE: 97.9 F | SYSTOLIC BLOOD PRESSURE: 108 MMHG | RESPIRATION RATE: 18 BRPM | HEART RATE: 100 BPM

## 2025-06-24 DIAGNOSIS — L97.412 DIABETIC ULCER OF RIGHT MIDFOOT ASSOCIATED WITH TYPE 2 DIABETES MELLITUS, WITH FAT LAYER EXPOSED (HCC): ICD-10-CM

## 2025-06-24 DIAGNOSIS — E11.621 DIABETIC ULCER OF LEFT MIDFOOT ASSOCIATED WITH TYPE 2 DIABETES MELLITUS, WITH FAT LAYER EXPOSED (HCC): ICD-10-CM

## 2025-06-24 DIAGNOSIS — E11.42 DIABETIC POLYNEUROPATHY ASSOCIATED WITH TYPE 2 DIABETES MELLITUS (HCC): Primary | ICD-10-CM

## 2025-06-24 DIAGNOSIS — E11.621 DIABETIC ULCER OF RIGHT MIDFOOT ASSOCIATED WITH TYPE 2 DIABETES MELLITUS, WITH FAT LAYER EXPOSED (HCC): ICD-10-CM

## 2025-06-24 DIAGNOSIS — L97.422 DIABETIC ULCER OF LEFT MIDFOOT ASSOCIATED WITH TYPE 2 DIABETES MELLITUS, WITH FAT LAYER EXPOSED (HCC): ICD-10-CM

## 2025-06-24 PROCEDURE — 11042 DBRDMT SUBQ TIS 1ST 20SQCM/<: CPT

## 2025-06-24 RX ORDER — MUPIROCIN 2 %
OINTMENT (GRAM) TOPICAL PRN
OUTPATIENT
Start: 2025-06-24

## 2025-06-24 RX ORDER — CLOBETASOL PROPIONATE 0.5 MG/G
OINTMENT TOPICAL PRN
OUTPATIENT
Start: 2025-06-24

## 2025-06-24 RX ORDER — LIDOCAINE 40 MG/G
CREAM TOPICAL PRN
OUTPATIENT
Start: 2025-06-24

## 2025-06-24 RX ORDER — TRIAMCINOLONE ACETONIDE 1 MG/G
OINTMENT TOPICAL PRN
OUTPATIENT
Start: 2025-06-24

## 2025-06-24 RX ORDER — BETAMETHASONE DIPROPIONATE 0.5 MG/G
CREAM TOPICAL PRN
OUTPATIENT
Start: 2025-06-24

## 2025-06-24 RX ORDER — BACITRACIN ZINC AND POLYMYXIN B SULFATE 500; 1000 [USP'U]/G; [USP'U]/G
OINTMENT TOPICAL PRN
OUTPATIENT
Start: 2025-06-24

## 2025-06-24 RX ORDER — LIDOCAINE HYDROCHLORIDE 20 MG/ML
JELLY TOPICAL PRN
Status: DISCONTINUED | OUTPATIENT
Start: 2025-06-24 | End: 2025-06-25 | Stop reason: HOSPADM

## 2025-06-24 RX ORDER — LIDOCAINE HYDROCHLORIDE 40 MG/ML
SOLUTION TOPICAL PRN
OUTPATIENT
Start: 2025-06-24

## 2025-06-24 RX ORDER — SODIUM CHLOR/HYPOCHLOROUS ACID 0.033 %
SOLUTION, IRRIGATION IRRIGATION PRN
Status: DISCONTINUED | OUTPATIENT
Start: 2025-06-24 | End: 2025-06-25 | Stop reason: HOSPADM

## 2025-06-24 RX ORDER — GINSENG 100 MG
CAPSULE ORAL PRN
OUTPATIENT
Start: 2025-06-24

## 2025-06-24 RX ORDER — LIDOCAINE 50 MG/G
OINTMENT TOPICAL PRN
OUTPATIENT
Start: 2025-06-24

## 2025-06-24 RX ORDER — LIDOCAINE HYDROCHLORIDE 20 MG/ML
JELLY TOPICAL PRN
OUTPATIENT
Start: 2025-06-24

## 2025-06-24 RX ORDER — SILVER SULFADIAZINE 10 MG/G
CREAM TOPICAL PRN
OUTPATIENT
Start: 2025-06-24

## 2025-06-24 RX ORDER — NEOMYCIN/BACITRACIN/POLYMYXINB 3.5-400-5K
OINTMENT (GRAM) TOPICAL PRN
OUTPATIENT
Start: 2025-06-24

## 2025-06-24 RX ORDER — SODIUM CHLOR/HYPOCHLOROUS ACID 0.033 %
SOLUTION, IRRIGATION IRRIGATION PRN
OUTPATIENT
Start: 2025-06-24

## 2025-06-24 RX ORDER — GENTAMICIN SULFATE 1 MG/G
OINTMENT TOPICAL PRN
OUTPATIENT
Start: 2025-06-24

## 2025-06-24 RX ADMIN — LIDOCAINE HYDROCHLORIDE: 20 JELLY TOPICAL at 16:36

## 2025-06-24 RX ADMIN — Medication: at 16:36

## 2025-06-24 ASSESSMENT — PAIN DESCRIPTION - LOCATION: LOCATION: FOOT

## 2025-06-24 ASSESSMENT — PAIN SCALES - GENERAL: PAINLEVEL_OUTOF10: 9

## 2025-06-24 ASSESSMENT — PAIN DESCRIPTION - ORIENTATION: ORIENTATION: RIGHT

## 2025-06-24 NOTE — WOUND CARE
Discharge Instructions for  [facility]  [address]  [address]  [place] [address]  Phone [phone]   Fax [phone]      NAME:  Mustapha Naranjo  YOB: 1965  MEDICAL RECORD NUMBER:  [id number]  DATE:  6/24/2025    Return Appointment:   1 week with Aleyda Mcginnis NP    Instructions:   Left plantar Foot:  Cleanse wounds with Normal Saline  Vashe post debridement and with dressing changes  Hydrofera Ready: Cut to wound size, place in wound bed, shiny side out.   Cover with ABD or Bordered Foam.  Jobst stocking with velcro wrap  Change weekly in office      Right foot:  Apply Vaseline to area daily  Wear velcro compression wrap daily    Left Dorsal Foot wound resolved  Continue taking antibiotic until complete.   BEDSIDE ABIs completed: L: 1.21 R: 1.21    Order Darco shoe with PEG insert from [employer] for left foot     Do not cut compression wraps off. If wraps become too loose or slide down, call wound center to make an appointment for dressing change. If wraps become too tight, try elevating your legs to assist fluid drainage.  Elevate legs when sitting.  Avoid prolonged standing or sitting with legs in dependent position.  Be cautious of increased salt intake as this promotes fluid retention and swelling.  Increase protein intake to promote wound healing. Mian and Ensure are examples of protein supplements.  Do not get wound wet in shower, pool or tub. May purchase cast cover at local pharmacy to keep dry in shower.      Should you experience increased redness, swelling, pain, foul odor, size of wound(s), or have a temperature over 101 degrees please contact the Wound Healing Center at [phone] or if after hours contact your primary care physician or go to the hospital emergency department.    PLEASE NOTE: IF YOU ARE UNABLE TO OBTAIN WOUND SUPPLIES, CONTINUE TO USE THE SUPPLIES YOU HAVE AVAILABLE UNTIL YOU ARE ABLE TO REACH US. IT IS MOST IMPORTANT TO KEEP THE

## 2025-06-24 NOTE — FLOWSHEET NOTE
06/24/25 1539   Right Leg Edema Point of Measurement   Leg circumference 37 cm   Ankle circumference 25 cm   Foot circumference 25.5 cm   Compression Therapy Compression stockings   Left Leg Edema Point of Measurement   Leg circumference 33 cm   Ankle circumference 23.5 cm   Foot circumference 26.5 cm   Compression Therapy 2 layer compression wrap   Wound 05/14/25 Foot Right;Plantar #2   Date First Assessed/Time First Assessed: 05/14/25 0850   Present on Original Admission: Yes  Wound Approximate Age at First Assessment (Weeks): 2 weeks  Primary Wound Type: Neuropathic Ulcer  Location: Foot  Wound Location Orientation: Right;Plantar  ...   Wound Image    Wound Etiology Diabetic Bishop 1   Dressing Status Dry   Wound Cleansed Soap and water   Dressing/Treatment Hydrofera blue   Offloading for Diabetic Foot Ulcers Other (comment)   Wound Length (cm) 0 cm   Wound Width (cm) 0 cm   Wound Depth (cm) 0 cm   Wound Surface Area (cm^2) 0 cm^2   Change in Wound Size % (l*w) 100   Wound Volume (cm^3) 0 cm^3   Wound Healing % 100   Wound 05/14/25 Foot Left;Dorsal #1   Date First Assessed/Time First Assessed: 05/14/25 0849   Present on Original Admission: Yes  Wound Approximate Age at First Assessment (Weeks): 12 weeks  Primary Wound Type: Neuropathic Ulcer  Location: Foot  Wound Location Orientation: Left;Dorsal  W...   Wound Length (cm)  --    Wound Width (cm)  --    Wound Depth (cm)  --    Wound Surface Area (cm^2)  --    Change in Wound Size % (l*w)  --    Wound Volume (cm^3)  --    Wound Healing %  --    Wound 05/14/25 Foot Left;Plantar #3   Date First Assessed/Time First Assessed: 05/14/25 0850   Present on Original Admission: Yes  Wound Approximate Age at First Assessment (Weeks): 2 weeks  Primary Wound Type: Diabetic Ulcer  Location: Foot  Wound Location Orientation: Left;Plantar  Woun...   Wound Image     Wound Etiology Diabetic Bishop 1   Dressing Status Intact   Wound Cleansed Soap and water   Dressing/Treatment

## 2025-07-01 ENCOUNTER — HOSPITAL ENCOUNTER (OUTPATIENT)
Dept: WOUND CARE | Age: 60
Discharge: HOME | End: 2025-07-01
Payer: MEDICARE

## 2025-07-01 VITALS
RESPIRATION RATE: 18 BRPM | HEIGHT: 73 IN | BODY MASS INDEX: 32.47 KG/M2 | DIASTOLIC BLOOD PRESSURE: 68 MMHG | TEMPERATURE: 98.2 F | SYSTOLIC BLOOD PRESSURE: 112 MMHG | OXYGEN SATURATION: 96 % | HEART RATE: 102 BPM | WEIGHT: 245 LBS

## 2025-07-01 DIAGNOSIS — E11.621 DIABETIC ULCER OF LEFT MIDFOOT ASSOCIATED WITH TYPE 2 DIABETES MELLITUS, WITH FAT LAYER EXPOSED (HCC): ICD-10-CM

## 2025-07-01 DIAGNOSIS — E11.621 DIABETIC ULCER OF RIGHT MIDFOOT ASSOCIATED WITH TYPE 2 DIABETES MELLITUS, WITH FAT LAYER EXPOSED (HCC): ICD-10-CM

## 2025-07-01 DIAGNOSIS — E11.42 DIABETIC POLYNEUROPATHY ASSOCIATED WITH TYPE 2 DIABETES MELLITUS (HCC): Primary | ICD-10-CM

## 2025-07-01 DIAGNOSIS — L97.422 DIABETIC ULCER OF LEFT MIDFOOT ASSOCIATED WITH TYPE 2 DIABETES MELLITUS, WITH FAT LAYER EXPOSED (HCC): ICD-10-CM

## 2025-07-01 DIAGNOSIS — L97.412 DIABETIC ULCER OF RIGHT MIDFOOT ASSOCIATED WITH TYPE 2 DIABETES MELLITUS, WITH FAT LAYER EXPOSED (HCC): ICD-10-CM

## 2025-07-01 PROCEDURE — 87077 CULTURE AEROBIC IDENTIFY: CPT

## 2025-07-01 PROCEDURE — 87070 CULTURE OTHR SPECIMN AEROBIC: CPT

## 2025-07-01 PROCEDURE — 11042 DBRDMT SUBQ TIS 1ST 20SQCM/<: CPT

## 2025-07-01 PROCEDURE — 87176 TISSUE HOMOGENIZATION CULTR: CPT

## 2025-07-01 PROCEDURE — 87186 SC STD MICRODIL/AGAR DIL: CPT

## 2025-07-01 PROCEDURE — 87205 SMEAR GRAM STAIN: CPT

## 2025-07-01 PROCEDURE — 87075 CULTR BACTERIA EXCEPT BLOOD: CPT

## 2025-07-01 RX ORDER — NEOMYCIN/BACITRACIN/POLYMYXINB 3.5-400-5K
OINTMENT (GRAM) TOPICAL PRN
OUTPATIENT
Start: 2025-07-01

## 2025-07-01 RX ORDER — GINSENG 100 MG
CAPSULE ORAL PRN
OUTPATIENT
Start: 2025-07-01

## 2025-07-01 RX ORDER — SILVER SULFADIAZINE 10 MG/G
CREAM TOPICAL PRN
OUTPATIENT
Start: 2025-07-01

## 2025-07-01 RX ORDER — SODIUM CHLOR/HYPOCHLOROUS ACID 0.033 %
SOLUTION, IRRIGATION IRRIGATION PRN
OUTPATIENT
Start: 2025-07-01

## 2025-07-01 RX ORDER — BETAMETHASONE DIPROPIONATE 0.5 MG/G
CREAM TOPICAL PRN
OUTPATIENT
Start: 2025-07-01

## 2025-07-01 RX ORDER — TRIAMCINOLONE ACETONIDE 1 MG/G
OINTMENT TOPICAL PRN
OUTPATIENT
Start: 2025-07-01

## 2025-07-01 RX ORDER — MUPIROCIN 2 %
OINTMENT (GRAM) TOPICAL PRN
OUTPATIENT
Start: 2025-07-01

## 2025-07-01 RX ORDER — GENTAMICIN SULFATE 1 MG/G
OINTMENT TOPICAL PRN
OUTPATIENT
Start: 2025-07-01

## 2025-07-01 RX ORDER — LIDOCAINE HYDROCHLORIDE 20 MG/ML
JELLY TOPICAL PRN
Status: DISCONTINUED | OUTPATIENT
Start: 2025-07-01 | End: 2025-07-02 | Stop reason: HOSPADM

## 2025-07-01 RX ORDER — LIDOCAINE HYDROCHLORIDE 20 MG/ML
JELLY TOPICAL PRN
OUTPATIENT
Start: 2025-07-01

## 2025-07-01 RX ORDER — BACITRACIN ZINC AND POLYMYXIN B SULFATE 500; 1000 [USP'U]/G; [USP'U]/G
OINTMENT TOPICAL PRN
OUTPATIENT
Start: 2025-07-01

## 2025-07-01 RX ORDER — LIDOCAINE HYDROCHLORIDE 40 MG/ML
SOLUTION TOPICAL PRN
OUTPATIENT
Start: 2025-07-01

## 2025-07-01 RX ORDER — LIDOCAINE 50 MG/G
OINTMENT TOPICAL PRN
OUTPATIENT
Start: 2025-07-01

## 2025-07-01 RX ORDER — LIDOCAINE 40 MG/G
CREAM TOPICAL PRN
OUTPATIENT
Start: 2025-07-01

## 2025-07-01 RX ORDER — CLOBETASOL PROPIONATE 0.5 MG/G
OINTMENT TOPICAL PRN
OUTPATIENT
Start: 2025-07-01

## 2025-07-01 RX ADMIN — LIDOCAINE HYDROCHLORIDE: 20 JELLY TOPICAL at 15:15

## 2025-07-01 ASSESSMENT — PAIN SCALES - GENERAL: PAINLEVEL_OUTOF10: 9

## 2025-07-01 NOTE — DISCHARGE INSTRUCTIONS
Instructions:   Left plantar Foot:  Cleanse wounds with Normal Saline  Vashe post debridement and with dressing changes  Hydrofera Ready: Cut to wound size, place in wound bed, shiny side out.   Cover with ABD or Bordered Foam.  Jobst stocking with velcro wrap  Change weekly in office     Right foot:  Apply Vaseline to area daily  Wear velcro compression wrap daily

## 2025-07-01 NOTE — FLOWSHEET NOTE
07/01/25 1503   Right Leg Edema Point of Measurement   Leg circumference 35 cm   Ankle circumference 21.5 cm   Foot circumference 25 cm   Compression Therapy Compression stockings   Left Leg Edema Point of Measurement   Leg circumference 34 cm   Ankle circumference 23 cm   Foot circumference 27 cm   Compression Therapy Compression stockings   Wound 05/14/25 Foot Left;Dorsal #1   Date First Assessed/Time First Assessed: 05/14/25 0849   Present on Original Admission: Yes  Wound Approximate Age at First Assessment (Weeks): 12 weeks  Primary Wound Type: Neuropathic Ulcer  Location: Foot  Wound Location Orientation: Left;Dorsal  W...   Wound Image    Wound Etiology Diabetic Bishop 1   Dressing Status New dressing applied   Wound Cleansed Soap and water   Dressing/Treatment Hydrofera blue;Foam   Offloading for Diabetic Foot Ulcers Post op shoe   Wound Length (cm) 0.2 cm   Wound Width (cm) 0.2 cm   Wound Depth (cm) 0.1 cm   Wound Surface Area (cm^2) 0.04 cm^2   Change in Wound Size % (l*w) 80   Wound Volume (cm^3) 0.004 cm^3   Wound Healing % 80   Wound Assessment Pink/red   Drainage Amount None (dry)   Odor None   Larisa-wound Assessment Intact   Margins Defined edges   Wound Thickness Description not for Pressure Injury Partial thickness   Wound 05/14/25 Foot Right;Plantar #2   Date First Assessed/Time First Assessed: 05/14/25 0850   Present on Original Admission: Yes  Wound Approximate Age at First Assessment (Weeks): 2 weeks  Primary Wound Type: Neuropathic Ulcer  Location: Foot  Wound Location Orientation: Right;Plantar  ...   Wound Image    Wound Etiology Diabetic Bishop 1   Dressing Status Dry   Wound Cleansed Soap and water   Dressing/Treatment Other (comment)  (vaseline)   Offloading for Diabetic Foot Ulcers Other (comment)   Wound Length (cm) 0 cm   Wound Width (cm) 0 cm   Wound Depth (cm) 0 cm   Wound Surface Area (cm^2) 0 cm^2   Change in Wound Size % (l*w) 100   Wound Volume (cm^3) 0 cm^3   Wound Healing %

## 2025-07-01 NOTE — WOUND CARE
Discharge Instructions for  [facility]  [address]  [address]  [place] [address]  Phone [phone]   Fax [phone]      NAME:  Mustapha Naranjo  YOB: 1965  MEDICAL RECORD NUMBER:  [id number]  DATE:  7/1/2025    Return Appointment:   1 week with Aleyda Mcginnis NP    Instructions:   Left plantar Foot:  Cleanse wounds with Normal Saline  Vashe post debridement and with dressing changes  Hydrofera Ready: Cut to wound size, place in wound bed, shiny side out.   Cover with ABD or Bordered Foam.  Jobst stocking with velcro wrap  Change weekly in office    Right foot:  Apply Vaseline to area daily  Wear velcro compression wrap daily    Wear darco shoe anytime your foot is on the floor  Left Dorsal Foot wound resolved  Continue taking antibiotic until complete.   BEDSIDE ABIs completed: L: 1.21 R: 1.21    Xray ordered for left foot.     Wound culture taken at today's visit.  Do not cut compression wraps off. If wraps become too loose or slide down, call wound center to make an appointment for dressing change. If wraps become too tight, try elevating your legs to assist fluid drainage.  Elevate legs when sitting.  Avoid prolonged standing or sitting with legs in dependent position.  Be cautious of increased salt intake as this promotes fluid retention and swelling.  Increase protein intake to promote wound healing. Mian and Ensure are examples of protein supplements.  Do not get wound wet in shower, pool or tub. May purchase cast cover at local pharmacy to keep dry in shower.    Should you experience increased redness, swelling, pain, foul odor, size of wound(s), or have a temperature over 101 degrees please contact the Wound Healing Center at [phone] or if after hours contact your primary care physician or go to the hospital emergency department.    PLEASE NOTE: IF YOU ARE UNABLE TO OBTAIN WOUND SUPPLIES, CONTINUE TO USE THE SUPPLIES YOU HAVE AVAILABLE UNTIL YOU

## 2025-07-03 LAB
BACTERIA SPEC CULT: NORMAL
SERVICE CMNT-IMP: NORMAL

## 2025-07-05 LAB
BACTERIA SPEC CULT: ABNORMAL
BACTERIA SPEC CULT: ABNORMAL
GRAM STN SPEC: ABNORMAL
GRAM STN SPEC: ABNORMAL
SERVICE CMNT-IMP: ABNORMAL

## 2025-07-07 ENCOUNTER — HOSPITAL ENCOUNTER (OUTPATIENT)
Dept: WOUND CARE | Age: 60
Discharge: HOME | End: 2025-07-07
Payer: MEDICARE

## 2025-07-07 ENCOUNTER — RESULTS FOLLOW-UP (OUTPATIENT)
Dept: WOUND CARE | Age: 60
End: 2025-07-07

## 2025-07-07 VITALS
RESPIRATION RATE: 18 BRPM | SYSTOLIC BLOOD PRESSURE: 106 MMHG | HEIGHT: 73 IN | HEART RATE: 108 BPM | DIASTOLIC BLOOD PRESSURE: 62 MMHG | TEMPERATURE: 97.7 F | BODY MASS INDEX: 32.87 KG/M2 | WEIGHT: 248 LBS

## 2025-07-07 DIAGNOSIS — E11.621 DIABETIC ULCER OF LEFT MIDFOOT ASSOCIATED WITH TYPE 2 DIABETES MELLITUS, WITH FAT LAYER EXPOSED (HCC): ICD-10-CM

## 2025-07-07 DIAGNOSIS — L97.422 DIABETIC ULCER OF LEFT MIDFOOT ASSOCIATED WITH TYPE 2 DIABETES MELLITUS, WITH FAT LAYER EXPOSED (HCC): ICD-10-CM

## 2025-07-07 DIAGNOSIS — E11.42 DIABETIC POLYNEUROPATHY ASSOCIATED WITH TYPE 2 DIABETES MELLITUS (HCC): Primary | ICD-10-CM

## 2025-07-07 LAB
BACTERIA SPEC CULT: NORMAL
SERVICE CMNT-IMP: NORMAL

## 2025-07-07 PROCEDURE — 11042 DBRDMT SUBQ TIS 1ST 20SQCM/<: CPT

## 2025-07-07 RX ORDER — SILVER SULFADIAZINE 10 MG/G
CREAM TOPICAL PRN
OUTPATIENT
Start: 2025-07-07

## 2025-07-07 RX ORDER — NEOMYCIN/BACITRACIN/POLYMYXINB 3.5-400-5K
OINTMENT (GRAM) TOPICAL PRN
OUTPATIENT
Start: 2025-07-07

## 2025-07-07 RX ORDER — BETAMETHASONE DIPROPIONATE 0.5 MG/G
CREAM TOPICAL PRN
OUTPATIENT
Start: 2025-07-07

## 2025-07-07 RX ORDER — GINSENG 100 MG
CAPSULE ORAL PRN
OUTPATIENT
Start: 2025-07-07

## 2025-07-07 RX ORDER — LIDOCAINE HYDROCHLORIDE 20 MG/ML
JELLY TOPICAL PRN
OUTPATIENT
Start: 2025-07-07

## 2025-07-07 RX ORDER — SODIUM CHLOR/HYPOCHLOROUS ACID 0.033 %
SOLUTION, IRRIGATION IRRIGATION PRN
Status: DISCONTINUED | OUTPATIENT
Start: 2025-07-07 | End: 2025-07-08 | Stop reason: HOSPADM

## 2025-07-07 RX ORDER — SODIUM CHLOR/HYPOCHLOROUS ACID 0.033 %
SOLUTION, IRRIGATION IRRIGATION PRN
OUTPATIENT
Start: 2025-07-07

## 2025-07-07 RX ORDER — MUPIROCIN 2 %
OINTMENT (GRAM) TOPICAL PRN
OUTPATIENT
Start: 2025-07-07

## 2025-07-07 RX ORDER — LIDOCAINE 40 MG/G
CREAM TOPICAL PRN
OUTPATIENT
Start: 2025-07-07

## 2025-07-07 RX ORDER — CLOBETASOL PROPIONATE 0.5 MG/G
OINTMENT TOPICAL PRN
OUTPATIENT
Start: 2025-07-07

## 2025-07-07 RX ORDER — LIDOCAINE HYDROCHLORIDE 20 MG/ML
JELLY TOPICAL PRN
Status: DISCONTINUED | OUTPATIENT
Start: 2025-07-07 | End: 2025-07-08 | Stop reason: HOSPADM

## 2025-07-07 RX ORDER — GENTAMICIN SULFATE 1 MG/G
OINTMENT TOPICAL PRN
OUTPATIENT
Start: 2025-07-07

## 2025-07-07 RX ORDER — BACITRACIN ZINC AND POLYMYXIN B SULFATE 500; 1000 [USP'U]/G; [USP'U]/G
OINTMENT TOPICAL PRN
OUTPATIENT
Start: 2025-07-07

## 2025-07-07 RX ORDER — LIDOCAINE HYDROCHLORIDE 40 MG/ML
SOLUTION TOPICAL PRN
OUTPATIENT
Start: 2025-07-07

## 2025-07-07 RX ORDER — LIDOCAINE 50 MG/G
OINTMENT TOPICAL PRN
OUTPATIENT
Start: 2025-07-07

## 2025-07-07 RX ORDER — TRIAMCINOLONE ACETONIDE 1 MG/G
OINTMENT TOPICAL PRN
OUTPATIENT
Start: 2025-07-07

## 2025-07-07 RX ADMIN — LIDOCAINE HYDROCHLORIDE: 20 JELLY TOPICAL at 11:14

## 2025-07-07 RX ADMIN — Medication: at 11:14

## 2025-07-07 ASSESSMENT — PAIN DESCRIPTION - DESCRIPTORS: DESCRIPTORS: ACHING

## 2025-07-07 ASSESSMENT — PAIN DESCRIPTION - ORIENTATION: ORIENTATION: LEFT

## 2025-07-07 ASSESSMENT — PAIN SCALES - GENERAL: PAINLEVEL_OUTOF10: 8

## 2025-07-07 ASSESSMENT — PAIN DESCRIPTION - LOCATION: LOCATION: FOOT

## 2025-07-07 NOTE — PROGRESS NOTES
[facility]  Procedure Note    Mustapha Naranjo  MEDICAL RECORD NUMBER: [id number]  AGE: 60 y.o.     GENDER: male    : 1965  EPISODE DATE: 2025    Assessment & Plan  Diabetic polyneuropathy associated with type 2 diabetes mellitus (HCC)    Diabetic ulcer of left midfoot associated with type 2 diabetes mellitus, with fat layer exposed (HCC)  Assessment  Left lateral 5th MTPJ wound is slightly smaller; wound bed is slough covered  Negative probe to bone; periwound is slightly edematous and erythematous and patient reports increased pain  He is wearing offloading boot  Culture from 25 grew scant S. aureus and E. faecalis, both sensitive to Augmentin (EOT 24)  XR from 25 showed \"No acute focal osseous erosive findings.\"  Plan  Excisional debridement to remove devitalized tissue and promote wound healing   If no improvement with abx, will consider MRI  Wound care: Vashe cleanse, Hydrofera blue, change 3x weekly; tubigrip for compression  RTC 1 week     Procedure Note: Excisional Debridement  Indications: Based on my examination of the patient's wound(s) today, debridement is required to remove devitalized tissue, evaluate the wound base, and promote wound healing.  Performed by: KALYN Alcocer NP  Consent obtained: Yes  Time out taken: Yes  Pain control:     Wound #: 3  Diabetic/pressure/chronic non-pressure ulcers only: diabetic ulcer, fat layer exposed was/were debrided using curette. The wound(s) was/were debrided down through/to subcutaneous tissue. Devitalized tissue debrided: fibrin, biofilm, slough, and exudate.  Pre and post debridement measurements: see flow sheet  Total surface area debrided: 3.2 sq cm   Estimated blood loss: minimal amount blood loss  Hemostasis achieved: by pressure  Procedural pain: 2 / 10   Post procedural pain: 2 / 10   Response to treatment: Patient tolerated procedure well with minimal complaints of

## 2025-07-07 NOTE — WOUND CARE
Discharge Instructions for  [facility]  [address]  [address]  [place] [address]  Phone [phone]   Fax [phone]      NAME:  Mustapha Naranjo  YOB: 1965  MEDICAL RECORD NUMBER:  [id number]  DATE:  7/7/2025    Return Appointment:   1 week with Aleyda Mcginnis NP    Instructions:   Left plantar Foot:  Cleanse wounds with Normal Saline  Vashe post debridement and with dressing changes  Hydrofera Ready: Cut to wound size, place in wound bed, shiny side out.   Cover with ABD or Bordered Foam.  Jobst stocking with velcro wrap  Change weekly in office    Right foot:  Apply Vaseline to area daily  Wear velcro compression wrap daily    Wear darco shoe anytime your foot is on the floor  Left Dorsal Foot wound resolved  Continue taking antibiotic until complete.   BEDSIDE ABIs completed: L: 1.21 R: 1.21    Xray and Cultures discussed today.    Financial assistance paperwork given to patient today.     Elevate legs when sitting.  Avoid prolonged standing or sitting with legs in dependent position.  Be cautious of increased salt intake as this promotes fluid retention and swelling.  Increase protein intake to promote wound healing. Mian and Ensure are examples of protein supplements.  Do not get wound wet in shower, pool or tub. May purchase cast cover at local pharmacy to keep dry in shower.    Should you experience increased redness, swelling, pain, foul odor, size of wound(s), or have a temperature over 101 degrees please contact the Wound Healing Center at [phone] or if after hours contact your primary care physician or go to the hospital emergency department.    PLEASE NOTE: IF YOU ARE UNABLE TO OBTAIN WOUND SUPPLIES, CONTINUE TO USE THE SUPPLIES YOU HAVE AVAILABLE UNTIL YOU ARE ABLE TO REACH US. IT IS MOST IMPORTANT TO KEEP THE WOUND COVERED AT ALL TIMES.    Electronically signed Martha Merrill RN on 7/7/2025 at 11:02 AM

## 2025-07-07 NOTE — ASSESSMENT & PLAN NOTE
Assessment  Left lateral 5th MTPJ wound is slightly smaller; wound bed is slough covered  Negative probe to bone; periwound is slightly edematous and erythematous and patient reports increased pain  He is wearing offloading boot  Culture from 7/1/25 grew scant S. aureus and E. faecalis, both sensitive to Augmentin (EOT 7/18/24)  XR from 7/2/25 showed \"No acute focal osseous erosive findings.\"  Plan  Excisional debridement to remove devitalized tissue and promote wound healing   If no improvement with abx, will consider MRI  Wound care: Vashe cleanse, Hydrofera blue, change 3x weekly; tubigrip for compression  RTC 1 week

## 2025-07-07 NOTE — FLOWSHEET NOTE
07/07/25 1046   Right Leg Edema Point of Measurement   Compression Therapy Compression stockings   Left Leg Edema Point of Measurement   Leg circumference 37 cm   Ankle circumference 26 cm   Foot circumference 26 cm   Compression Therapy Compression stockings   Wound 05/14/25 Foot Left;Plantar #3   Date First Assessed/Time First Assessed: 05/14/25 0850   Present on Original Admission: Yes  Wound Approximate Age at First Assessment (Weeks): 2 weeks  Primary Wound Type: Diabetic Ulcer  Location: Foot  Wound Location Orientation: Left;Plantar  Woun...   Wound Image     Wound Etiology Diabetic Bishop 1   Dressing Status Old drainage noted   Wound Cleansed Cleansed with saline   Dressing/Treatment Hydrofera blue   Offloading for Diabetic Foot Ulcers Post op shoe   Wound Length (cm) 1.5 cm   Wound Width (cm) 2 cm   Wound Depth (cm) 0.1 cm   Wound Surface Area (cm^2) 3 cm^2   Change in Wound Size % (l*w) 18.48   Wound Volume (cm^3) 0.3 cm^3   Wound Healing % 18   Post-Procedure Length (cm) 1.6 cm   Post-Procedure Width (cm) 2 cm   Post-Procedure Depth (cm) 0.1 cm   Post-Procedure Surface Area (cm^2) 3.2 cm^2   Post-Procedure Volume (cm^3) 0.32 cm^3   Wound Assessment Pink/red   Drainage Amount Small (< 25%)   Drainage Description Serosanguinous   Odor None   Larisa-wound Assessment Edematous;Non-blanchable erythema   Margins Defined edges   Wound Thickness Description not for Pressure Injury Full thickness   Pain Assessment   Pain Assessment 0-10   Pain Level 8   Pain Location Foot   Pain Orientation Left   Pain Descriptors Aching     ASA daily

## 2025-07-14 ENCOUNTER — HOSPITAL ENCOUNTER (OUTPATIENT)
Dept: WOUND CARE | Age: 60
Discharge: HOME | End: 2025-07-14
Payer: COMMERCIAL

## 2025-07-14 VITALS
BODY MASS INDEX: 32.87 KG/M2 | WEIGHT: 248 LBS | DIASTOLIC BLOOD PRESSURE: 63 MMHG | TEMPERATURE: 97.7 F | RESPIRATION RATE: 16 BRPM | HEIGHT: 73 IN | SYSTOLIC BLOOD PRESSURE: 94 MMHG | HEART RATE: 101 BPM

## 2025-07-14 DIAGNOSIS — E11.621 DIABETIC ULCER OF LEFT MIDFOOT ASSOCIATED WITH TYPE 2 DIABETES MELLITUS, WITH FAT LAYER EXPOSED (HCC): Primary | Chronic | ICD-10-CM

## 2025-07-14 DIAGNOSIS — L97.422 DIABETIC ULCER OF LEFT MIDFOOT ASSOCIATED WITH TYPE 2 DIABETES MELLITUS, WITH FAT LAYER EXPOSED (HCC): Primary | Chronic | ICD-10-CM

## 2025-07-14 DIAGNOSIS — E11.42 DIABETIC POLYNEUROPATHY ASSOCIATED WITH TYPE 2 DIABETES MELLITUS (HCC): Chronic | ICD-10-CM

## 2025-07-14 PROCEDURE — 11042 DBRDMT SUBQ TIS 1ST 20SQCM/<: CPT

## 2025-07-14 RX ORDER — NEOMYCIN/BACITRACIN/POLYMYXINB 3.5-400-5K
OINTMENT (GRAM) TOPICAL PRN
OUTPATIENT
Start: 2025-07-14

## 2025-07-14 RX ORDER — LIDOCAINE HYDROCHLORIDE 20 MG/ML
JELLY TOPICAL PRN
Status: DISCONTINUED | OUTPATIENT
Start: 2025-07-14 | End: 2025-07-15 | Stop reason: HOSPADM

## 2025-07-14 RX ORDER — LIDOCAINE 40 MG/G
CREAM TOPICAL PRN
OUTPATIENT
Start: 2025-07-14

## 2025-07-14 RX ORDER — BETAMETHASONE DIPROPIONATE 0.5 MG/G
CREAM TOPICAL PRN
OUTPATIENT
Start: 2025-07-14

## 2025-07-14 RX ORDER — LIDOCAINE HYDROCHLORIDE 20 MG/ML
JELLY TOPICAL PRN
OUTPATIENT
Start: 2025-07-14

## 2025-07-14 RX ORDER — LIDOCAINE 50 MG/G
OINTMENT TOPICAL PRN
OUTPATIENT
Start: 2025-07-14

## 2025-07-14 RX ORDER — SODIUM CHLOR/HYPOCHLOROUS ACID 0.033 %
SOLUTION, IRRIGATION IRRIGATION PRN
OUTPATIENT
Start: 2025-07-14

## 2025-07-14 RX ORDER — GINSENG 100 MG
CAPSULE ORAL PRN
OUTPATIENT
Start: 2025-07-14

## 2025-07-14 RX ORDER — CLOBETASOL PROPIONATE 0.5 MG/G
OINTMENT TOPICAL PRN
OUTPATIENT
Start: 2025-07-14

## 2025-07-14 RX ORDER — SILVER SULFADIAZINE 10 MG/G
CREAM TOPICAL PRN
OUTPATIENT
Start: 2025-07-14

## 2025-07-14 RX ORDER — TRIAMCINOLONE ACETONIDE 1 MG/G
OINTMENT TOPICAL PRN
OUTPATIENT
Start: 2025-07-14

## 2025-07-14 RX ORDER — BACITRACIN ZINC AND POLYMYXIN B SULFATE 500; 1000 [USP'U]/G; [USP'U]/G
OINTMENT TOPICAL PRN
OUTPATIENT
Start: 2025-07-14

## 2025-07-14 RX ORDER — LIDOCAINE HYDROCHLORIDE 40 MG/ML
SOLUTION TOPICAL PRN
OUTPATIENT
Start: 2025-07-14

## 2025-07-14 RX ORDER — DOXYCYCLINE HYCLATE 100 MG
100 TABLET ORAL 2 TIMES DAILY
Qty: 28 TABLET | Refills: 0 | Status: SHIPPED | OUTPATIENT
Start: 2025-07-14 | End: 2025-07-28

## 2025-07-14 RX ORDER — GENTAMICIN SULFATE 1 MG/G
OINTMENT TOPICAL PRN
OUTPATIENT
Start: 2025-07-14

## 2025-07-14 RX ORDER — MUPIROCIN 2 %
OINTMENT (GRAM) TOPICAL PRN
OUTPATIENT
Start: 2025-07-14

## 2025-07-14 RX ADMIN — LIDOCAINE HYDROCHLORIDE: 20 JELLY TOPICAL at 10:37

## 2025-07-14 NOTE — WOUND CARE
Discharge Instructions for  [facility]  [address]  [address]  [place] [address]  Phone [phone]   Fax [phone]      NAME:  Mustapha Naranjo  YOB: 1965  MEDICAL RECORD NUMBER:  [id number]  DATE:  7/14/2025    Return Appointment:   1 week with Aleyda Mcginnis NP    Instructions:   Left plantar Foot:  Cleanse wounds with Normal Saline  Vashe post debridement and with dressing changes  Hydrofera Ready: Cut to wound size, place in wound bed, shiny side out.   Cover with ABD or Bordered Foam.  Jobst stocking with velcro wrap  Change weekly in office    Right foot:  Apply Vaseline to area daily  Wear velcro compression wrap daily    Wear darco shoe anytime your foot is on the floor  Left Dorsal Foot wound resolved  Continue taking antibiotic until complete. Extension sent today for augmentin  New order for doxycycline sent today.  BEDSIDE ABIs completed: L: 1.21 R: 1.21    MRI ordered. Please call [phone] to speak directly with a  to schedule your appointment.    Financial assistance paperwork given to patient today.     Elevate legs when sitting.  Avoid prolonged standing or sitting with legs in dependent position.  Be cautious of increased salt intake as this promotes fluid retention and swelling.  Increase protein intake to promote wound healing. Mian and Ensure are examples of protein supplements.  Do not get wound wet in shower, pool or tub. May purchase cast cover at local pharmacy to keep dry in shower.    Should you experience increased redness, swelling, pain, foul odor, size of wound(s), or have a temperature over 101 degrees please contact the Wound Healing Center at [phone] or if after hours contact your primary care physician or go to the hospital emergency department.    PLEASE NOTE: IF YOU ARE UNABLE TO OBTAIN WOUND SUPPLIES, CONTINUE TO USE THE SUPPLIES YOU HAVE AVAILABLE UNTIL YOU ARE ABLE TO REACH US. IT IS MOST IMPORTANT TO

## 2025-07-14 NOTE — FLOWSHEET NOTE
07/14/25 1022   Wound 05/14/25 Foot Left;Plantar #3   Date First Assessed/Time First Assessed: 05/14/25 0850   Present on Original Admission: Yes  Wound Approximate Age at First Assessment (Weeks): 2 weeks  Primary Wound Type: Diabetic Ulcer  Location: Foot  Wound Location Orientation: Left;Plantar  Woun...   Wound Image     Wound Etiology Diabetic Bishop 1   Dressing Status Intact   Wound Cleansed Vashe   Dressing/Treatment Hydrofera blue   Offloading for Diabetic Foot Ulcers Post op shoe   Wound Length (cm) 1.6 cm   Wound Width (cm) 1.9 cm   Wound Depth (cm) 0.1 cm   Wound Surface Area (cm^2) 3.04 cm^2   Change in Wound Size % (l*w) 17.39   Wound Volume (cm^3) 0.304 cm^3   Wound Healing % 17   Post-Procedure Length (cm) 1.7 cm   Post-Procedure Width (cm) 2 cm   Post-Procedure Depth (cm) 0.1 cm   Post-Procedure Surface Area (cm^2) 3.4 cm^2   Post-Procedure Volume (cm^3) 0.34 cm^3   Wound Assessment [place]/red;Slough   Drainage Amount Small (< 25%)   Drainage Description Serosanguinous   Odor None   Larisa-wound Assessment Edematous   Wound Thickness Description not for Pressure Injury Full thickness   Pain Assessment   Pain Assessment None - Denies Pain

## 2025-07-17 NOTE — ASSESSMENT & PLAN NOTE
Assessment  Left lateral 5th MTPJ wound is essentially unchanged; wound bed is slough covered  Negative probe to bone; periwound remains slightly edematous and erythematous  He remains on Augmentin  He states he is wearing his offloading boot, however he did not wear it to clinic today  He is not wearing compression  Plan  Excisional debridement to remove devitalized tissue and promote wound healing   Will add doxycycline   Will order MRI to evaluate for underlying OM   Wound care: Vashe cleanse, Hydrofera blue, change 3x weekly; encourage tubigrip for compression  Encouraged to use foot defender boot at all times  RTC 1 week

## 2025-07-17 NOTE — PROGRESS NOTES
[facility]  Procedure Note    Mustapha Naranjo  MEDICAL RECORD NUMBER: [id number]  AGE: 60 y.o.     GENDER: male    : 1965  EPISODE DATE: 2025    Assessment & Plan  Diabetic ulcer of left midfoot associated with type 2 diabetes mellitus, with fat layer exposed (HCC)  Assessment  Left lateral 5th MTPJ wound is essentially unchanged; wound bed is slough covered  Negative probe to bone; periwound remains slightly edematous and erythematous  He remains on Augmentin  He states he is wearing his offloading boot, however he did not wear it to clinic today  He is not wearing compression  Plan  Excisional debridement to remove devitalized tissue and promote wound healing   Will add doxycycline   Will order MRI to evaluate for underlying OM   Wound care: Vashe cleanse, Hydrofera blue, change 3x weekly; encourage tubigrip for compression  Encouraged to use foot defender boot at all times  RTC 1 week      Procedure Note: Excisional Debridement  Indications: Based on my examination of the patient's wound(s) today, debridement IS required to remove devitalized tissue, evaluate the wound base, and promote wound healing.  Performed by: KALYN Alcocer NP  Consent obtained: YES  Time out taken: YES  Pain control:     Wound #: 3  Diabetic/pressure/chronic non-pressure ulcers only: diabetic ulcer, fat layer exposed was/were debrided using curette. The wound(s) was/were debrided down through/to subcutaneous tissue. Devitalized tissue debrided: fibrin, biofilm, slough, and exudate.  Pre and post debridement measurements: see flow sheet  Total surface area debrided: 3.8 sq cm   Estimated blood loss: minimal amount blood loss  Hemostasis achieved: by pressure  Procedural pain: 0 / 10   Post procedural pain: 0 / 10   Response to treatment: Patient tolerated procedure well with no complaints of pain.    Wound 25 Foot Left;Dorsal #1 (Active)   Wound Image   25 1503

## 2025-07-21 ENCOUNTER — HOSPITAL ENCOUNTER (OUTPATIENT)
Dept: WOUND CARE | Age: 60
Discharge: HOME | End: 2025-07-21
Payer: COMMERCIAL

## 2025-07-21 VITALS
TEMPERATURE: 98 F | HEIGHT: 73 IN | SYSTOLIC BLOOD PRESSURE: 83 MMHG | DIASTOLIC BLOOD PRESSURE: 56 MMHG | RESPIRATION RATE: 18 BRPM | BODY MASS INDEX: 33 KG/M2 | WEIGHT: 249 LBS | HEART RATE: 86 BPM

## 2025-07-21 DIAGNOSIS — L97.422 DIABETIC ULCER OF LEFT MIDFOOT ASSOCIATED WITH TYPE 2 DIABETES MELLITUS, WITH FAT LAYER EXPOSED (HCC): Primary | Chronic | ICD-10-CM

## 2025-07-21 DIAGNOSIS — E11.621 DIABETIC ULCER OF LEFT MIDFOOT ASSOCIATED WITH TYPE 2 DIABETES MELLITUS, WITH FAT LAYER EXPOSED (HCC): Primary | Chronic | ICD-10-CM

## 2025-07-21 DIAGNOSIS — L97.412 DIABETIC ULCER OF RIGHT MIDFOOT ASSOCIATED WITH TYPE 2 DIABETES MELLITUS, WITH FAT LAYER EXPOSED (HCC): Chronic | ICD-10-CM

## 2025-07-21 DIAGNOSIS — E11.621 DIABETIC ULCER OF RIGHT MIDFOOT ASSOCIATED WITH TYPE 2 DIABETES MELLITUS, WITH FAT LAYER EXPOSED (HCC): Chronic | ICD-10-CM

## 2025-07-21 DIAGNOSIS — E11.42 DIABETIC POLYNEUROPATHY ASSOCIATED WITH TYPE 2 DIABETES MELLITUS (HCC): Chronic | ICD-10-CM

## 2025-07-21 PROCEDURE — 11042 DBRDMT SUBQ TIS 1ST 20SQCM/<: CPT

## 2025-07-21 RX ORDER — SILVER SULFADIAZINE 10 MG/G
CREAM TOPICAL PRN
OUTPATIENT
Start: 2025-07-21

## 2025-07-21 RX ORDER — LIDOCAINE 50 MG/G
OINTMENT TOPICAL PRN
OUTPATIENT
Start: 2025-07-21

## 2025-07-21 RX ORDER — GENTAMICIN SULFATE 1 MG/G
OINTMENT TOPICAL PRN
OUTPATIENT
Start: 2025-07-21

## 2025-07-21 RX ORDER — LIDOCAINE HYDROCHLORIDE 40 MG/ML
SOLUTION TOPICAL PRN
OUTPATIENT
Start: 2025-07-21

## 2025-07-21 RX ORDER — CLOBETASOL PROPIONATE 0.5 MG/G
OINTMENT TOPICAL PRN
OUTPATIENT
Start: 2025-07-21

## 2025-07-21 RX ORDER — GINSENG 100 MG
CAPSULE ORAL PRN
OUTPATIENT
Start: 2025-07-21

## 2025-07-21 RX ORDER — LIDOCAINE HYDROCHLORIDE 20 MG/ML
JELLY TOPICAL PRN
OUTPATIENT
Start: 2025-07-21

## 2025-07-21 RX ORDER — BACITRACIN ZINC AND POLYMYXIN B SULFATE 500; 1000 [USP'U]/G; [USP'U]/G
OINTMENT TOPICAL PRN
OUTPATIENT
Start: 2025-07-21

## 2025-07-21 RX ORDER — SODIUM CHLOR/HYPOCHLOROUS ACID 0.033 %
SOLUTION, IRRIGATION IRRIGATION PRN
OUTPATIENT
Start: 2025-07-21

## 2025-07-21 RX ORDER — MUPIROCIN 2 %
OINTMENT (GRAM) TOPICAL PRN
OUTPATIENT
Start: 2025-07-21

## 2025-07-21 RX ORDER — TRIAMCINOLONE ACETONIDE 1 MG/G
OINTMENT TOPICAL PRN
OUTPATIENT
Start: 2025-07-21

## 2025-07-21 RX ORDER — SODIUM CHLOR/HYPOCHLOROUS ACID 0.033 %
SOLUTION, IRRIGATION IRRIGATION PRN
Status: DISCONTINUED | OUTPATIENT
Start: 2025-07-21 | End: 2025-07-22 | Stop reason: HOSPADM

## 2025-07-21 RX ORDER — LIDOCAINE 40 MG/G
CREAM TOPICAL PRN
OUTPATIENT
Start: 2025-07-21

## 2025-07-21 RX ORDER — LIDOCAINE HYDROCHLORIDE 20 MG/ML
JELLY TOPICAL PRN
Status: DISCONTINUED | OUTPATIENT
Start: 2025-07-21 | End: 2025-07-22 | Stop reason: HOSPADM

## 2025-07-21 RX ORDER — NEOMYCIN/BACITRACIN/POLYMYXINB 3.5-400-5K
OINTMENT (GRAM) TOPICAL PRN
OUTPATIENT
Start: 2025-07-21

## 2025-07-21 RX ORDER — BETAMETHASONE DIPROPIONATE 0.5 MG/G
CREAM TOPICAL PRN
OUTPATIENT
Start: 2025-07-21

## 2025-07-21 RX ADMIN — Medication: at 10:49

## 2025-07-21 RX ADMIN — LIDOCAINE HYDROCHLORIDE: 20 JELLY TOPICAL at 10:49

## 2025-07-21 ASSESSMENT — PAIN SCALES - GENERAL: PAINLEVEL_OUTOF10: 8

## 2025-07-21 ASSESSMENT — PAIN DESCRIPTION - DESCRIPTORS: DESCRIPTORS: ACHING

## 2025-07-21 ASSESSMENT — PAIN DESCRIPTION - ORIENTATION: ORIENTATION: LEFT

## 2025-07-21 ASSESSMENT — PAIN DESCRIPTION - LOCATION: LOCATION: FOOT

## 2025-07-21 NOTE — WOUND CARE
Discharge Instructions for  [facility]  [address] [place] [address]  [address]  [place] [address]  Phone [phone]   Fax [phone]      NAME:  Mustapha Naranjo  YOB: 1965  MEDICAL RECORD NUMBER:  [id number]  DATE:  7/21/2025    Return Appointment:   2 weeks with Aleyda Mcginnis NP    Instructions:   Left plantar Foot:  Cleanse wounds with Normal Saline  Vashe post debridement and with dressing changes  Hydrofera Ready: Cut to wound size, place in wound bed, shiny side out.   Cover with ABD or Bordered Foam.  Apply 2 layer compression wrap  Change 3 times a week  Wear cam boot on left foot     Right foot:  Apply Vaseline to area daily  Wear velcro compression wrap daily    Wear darco shoe anytime your foot is on the floor  Continue taking antibiotic until complete. Refill sent for prescriptions today.  BEDSIDE ABIs completed at prior visit: L: 1.21 R: 1.21  MRI results viewed today.   Possible use of HBO therapy and TCC discussed this visit.   Referral for Dr. Jeffery's placed this visit.   Obtain Cam walker boot from [employer] website. Get the one with air release.       Home health please admit for dressing changes, monitoring, and assessment.        Elevate legs when sitting.  Avoid prolonged standing or sitting with legs in dependent position.  Be cautious of increased salt intake as this promotes fluid retention and swelling.  Increase protein intake to promote wound healing. Mian and Ensure are examples of protein supplements.  Do not get wound wet in shower, pool or tub. May purchase cast cover at local pharmacy to keep dry in shower.    Increase protein to 100g daily for optimal wound healing. Aim for 30g protein per shake, two shakes a day.  Protein:   Egg ~ 6g  Yogurt ~ 15g  Half cup of cottage cheese ~ 15g  Chicken breast ~ 30g  [place] filet ~ 40g       Should you experience increased redness, swelling, pain, foul odor, size of wound(s), or have a temperature over 101 degrees

## 2025-07-21 NOTE — WOUND CARE
Multilayer Compression Wrap   (Not Unna) Below the Knee    NAME:  Mustapha Naranjo  YOB: 1965  MEDICAL RECORD NUMBER:  [id number]  DATE:  7/21/2025    Multilayer compression wrap: Removed old Multilayer wrap if indicated and wash leg with mild soap/water.  Applied moisturizing agent to dry skin as needed.   Applied primary and secondary dressing as ordered.  Applied multilayered dressing below the knee to left lower leg.  Instructed patient/caregiver not to remove dressing and to keep it clean and dry.   Instructed patient/caregiver on complications to report to provider, such as pain, numbness in toes, heavy drainage, and slippage of dressing.  Instructed patient on purpose of compression dressing and on activity and exercise recommendations.      Electronically signed by RENÉ WHITE RN on 7/21/2025 at 11:30 AM

## 2025-08-04 ENCOUNTER — HOSPITAL ENCOUNTER (OUTPATIENT)
Dept: WOUND CARE | Age: 60
Discharge: HOME | End: 2025-08-04
Payer: MEDICARE

## 2025-08-04 VITALS
TEMPERATURE: 98 F | HEART RATE: 90 BPM | BODY MASS INDEX: 32.87 KG/M2 | HEIGHT: 73 IN | WEIGHT: 248 LBS | SYSTOLIC BLOOD PRESSURE: 122 MMHG | RESPIRATION RATE: 18 BRPM | DIASTOLIC BLOOD PRESSURE: 80 MMHG

## 2025-08-04 DIAGNOSIS — E11.621 DIABETIC ULCER OF LEFT MIDFOOT ASSOCIATED WITH TYPE 2 DIABETES MELLITUS, WITH FAT LAYER EXPOSED (HCC): Primary | ICD-10-CM

## 2025-08-04 DIAGNOSIS — E11.42 DIABETIC POLYNEUROPATHY ASSOCIATED WITH TYPE 2 DIABETES MELLITUS (HCC): ICD-10-CM

## 2025-08-04 DIAGNOSIS — L97.422 DIABETIC ULCER OF LEFT MIDFOOT ASSOCIATED WITH TYPE 2 DIABETES MELLITUS, WITH FAT LAYER EXPOSED (HCC): Primary | ICD-10-CM

## 2025-08-04 PROCEDURE — 11042 DBRDMT SUBQ TIS 1ST 20SQCM/<: CPT

## 2025-08-04 RX ORDER — LIDOCAINE 50 MG/G
OINTMENT TOPICAL PRN
OUTPATIENT
Start: 2025-08-04

## 2025-08-04 RX ORDER — BETAMETHASONE DIPROPIONATE 0.5 MG/G
CREAM TOPICAL PRN
OUTPATIENT
Start: 2025-08-04

## 2025-08-04 RX ORDER — LIDOCAINE HYDROCHLORIDE 20 MG/ML
JELLY TOPICAL PRN
Status: DISCONTINUED | OUTPATIENT
Start: 2025-08-04 | End: 2025-08-05 | Stop reason: HOSPADM

## 2025-08-04 RX ORDER — SODIUM CHLOR/HYPOCHLOROUS ACID 0.033 %
SOLUTION, IRRIGATION IRRIGATION PRN
Status: DISCONTINUED | OUTPATIENT
Start: 2025-08-04 | End: 2025-08-05 | Stop reason: HOSPADM

## 2025-08-04 RX ORDER — LIDOCAINE HYDROCHLORIDE 20 MG/ML
JELLY TOPICAL PRN
OUTPATIENT
Start: 2025-08-04

## 2025-08-04 RX ORDER — CLOBETASOL PROPIONATE 0.5 MG/G
OINTMENT TOPICAL PRN
OUTPATIENT
Start: 2025-08-04

## 2025-08-04 RX ORDER — SILVER SULFADIAZINE 10 MG/G
CREAM TOPICAL PRN
OUTPATIENT
Start: 2025-08-04

## 2025-08-04 RX ORDER — BACITRACIN ZINC AND POLYMYXIN B SULFATE 500; 1000 [USP'U]/G; [USP'U]/G
OINTMENT TOPICAL PRN
OUTPATIENT
Start: 2025-08-04

## 2025-08-04 RX ORDER — NEOMYCIN/BACITRACIN/POLYMYXINB 3.5-400-5K
OINTMENT (GRAM) TOPICAL PRN
OUTPATIENT
Start: 2025-08-04

## 2025-08-04 RX ORDER — LIDOCAINE 40 MG/G
CREAM TOPICAL PRN
OUTPATIENT
Start: 2025-08-04

## 2025-08-04 RX ORDER — MUPIROCIN 2 %
OINTMENT (GRAM) TOPICAL PRN
OUTPATIENT
Start: 2025-08-04

## 2025-08-04 RX ORDER — CIPROFLOXACIN 500 MG/1
500 TABLET, FILM COATED ORAL 2 TIMES DAILY
Qty: 60 TABLET | Refills: 0 | Status: SHIPPED | OUTPATIENT
Start: 2025-08-04 | End: 2025-09-03

## 2025-08-04 RX ORDER — TRIAMCINOLONE ACETONIDE 1 MG/G
OINTMENT TOPICAL PRN
OUTPATIENT
Start: 2025-08-04

## 2025-08-04 RX ORDER — LIDOCAINE HYDROCHLORIDE 40 MG/ML
SOLUTION TOPICAL PRN
OUTPATIENT
Start: 2025-08-04

## 2025-08-04 RX ORDER — SODIUM CHLOR/HYPOCHLOROUS ACID 0.033 %
SOLUTION, IRRIGATION IRRIGATION PRN
Status: CANCELLED | OUTPATIENT
Start: 2025-08-04

## 2025-08-04 RX ORDER — GENTAMICIN SULFATE 1 MG/G
OINTMENT TOPICAL PRN
OUTPATIENT
Start: 2025-08-04

## 2025-08-04 RX ORDER — GINSENG 100 MG
CAPSULE ORAL PRN
OUTPATIENT
Start: 2025-08-04

## 2025-08-04 RX ADMIN — LIDOCAINE HYDROCHLORIDE: 20 JELLY TOPICAL at 12:07

## 2025-08-04 RX ADMIN — Medication: at 12:07

## 2025-08-04 ASSESSMENT — PAIN SCALES - GENERAL: PAINLEVEL_OUTOF10: 8

## 2025-08-04 ASSESSMENT — PAIN DESCRIPTION - LOCATION: LOCATION: FOOT

## 2025-08-04 ASSESSMENT — PAIN DESCRIPTION - ORIENTATION: ORIENTATION: RIGHT

## 2025-08-06 ENCOUNTER — TELEPHONE (OUTPATIENT)
Dept: WOUND CARE | Age: 60
End: 2025-08-06

## 2025-08-06 ENCOUNTER — HOSPITAL ENCOUNTER (OUTPATIENT)
Dept: WOUND CARE | Age: 60
Discharge: HOME | End: 2025-08-06
Payer: MEDICARE

## 2025-08-06 VITALS
OXYGEN SATURATION: 97 % | SYSTOLIC BLOOD PRESSURE: 102 MMHG | DIASTOLIC BLOOD PRESSURE: 52 MMHG | WEIGHT: 247 LBS | TEMPERATURE: 98.2 F | BODY MASS INDEX: 32.74 KG/M2 | HEART RATE: 97 BPM | HEIGHT: 73 IN | RESPIRATION RATE: 18 BRPM

## 2025-08-06 DIAGNOSIS — E11.621 DIABETIC ULCER OF LEFT MIDFOOT ASSOCIATED WITH TYPE 2 DIABETES MELLITUS, WITH FAT LAYER EXPOSED (HCC): Primary | Chronic | ICD-10-CM

## 2025-08-06 DIAGNOSIS — L97.422 DIABETIC ULCER OF LEFT MIDFOOT ASSOCIATED WITH TYPE 2 DIABETES MELLITUS, WITH FAT LAYER EXPOSED (HCC): Primary | Chronic | ICD-10-CM

## 2025-08-06 DIAGNOSIS — E11.42 DIABETIC POLYNEUROPATHY ASSOCIATED WITH TYPE 2 DIABETES MELLITUS (HCC): Chronic | ICD-10-CM

## 2025-08-06 PROCEDURE — 29445 APPL RIGID TOT CNTC LEG CAST: CPT

## 2025-08-06 RX ORDER — SODIUM CHLOR/HYPOCHLOROUS ACID 0.033 %
SOLUTION, IRRIGATION IRRIGATION PRN
Status: DISCONTINUED | OUTPATIENT
Start: 2025-08-06 | End: 2025-08-07 | Stop reason: HOSPADM

## 2025-08-06 RX ORDER — MUPIROCIN 2 %
OINTMENT (GRAM) TOPICAL PRN
OUTPATIENT
Start: 2025-08-06

## 2025-08-06 RX ORDER — NEOMYCIN/BACITRACIN/POLYMYXINB 3.5-400-5K
OINTMENT (GRAM) TOPICAL PRN
OUTPATIENT
Start: 2025-08-06

## 2025-08-06 RX ORDER — LIDOCAINE 50 MG/G
OINTMENT TOPICAL PRN
OUTPATIENT
Start: 2025-08-06

## 2025-08-06 RX ORDER — GENTAMICIN SULFATE 1 MG/G
OINTMENT TOPICAL PRN
OUTPATIENT
Start: 2025-08-06

## 2025-08-06 RX ORDER — LIDOCAINE HYDROCHLORIDE 20 MG/ML
JELLY TOPICAL PRN
OUTPATIENT
Start: 2025-08-06

## 2025-08-06 RX ORDER — BACITRACIN ZINC AND POLYMYXIN B SULFATE 500; 1000 [USP'U]/G; [USP'U]/G
OINTMENT TOPICAL PRN
OUTPATIENT
Start: 2025-08-06

## 2025-08-06 RX ORDER — CLOBETASOL PROPIONATE 0.5 MG/G
OINTMENT TOPICAL PRN
OUTPATIENT
Start: 2025-08-06

## 2025-08-06 RX ORDER — BETAMETHASONE DIPROPIONATE 0.5 MG/G
CREAM TOPICAL PRN
OUTPATIENT
Start: 2025-08-06

## 2025-08-06 RX ORDER — SODIUM CHLOR/HYPOCHLOROUS ACID 0.033 %
SOLUTION, IRRIGATION IRRIGATION PRN
OUTPATIENT
Start: 2025-08-06

## 2025-08-06 RX ORDER — TRIAMCINOLONE ACETONIDE 1 MG/G
OINTMENT TOPICAL PRN
OUTPATIENT
Start: 2025-08-06

## 2025-08-06 RX ORDER — LIDOCAINE HYDROCHLORIDE 40 MG/ML
SOLUTION TOPICAL PRN
OUTPATIENT
Start: 2025-08-06

## 2025-08-06 RX ORDER — LIDOCAINE 40 MG/G
CREAM TOPICAL PRN
OUTPATIENT
Start: 2025-08-06

## 2025-08-06 RX ORDER — SILVER SULFADIAZINE 10 MG/G
CREAM TOPICAL PRN
OUTPATIENT
Start: 2025-08-06

## 2025-08-06 RX ORDER — GINSENG 100 MG
CAPSULE ORAL PRN
OUTPATIENT
Start: 2025-08-06

## 2025-08-06 RX ADMIN — Medication: at 11:13

## 2025-08-08 ENCOUNTER — TELEPHONE (OUTPATIENT)
Dept: FAMILY MEDICINE CLINIC | Facility: CLINIC | Age: 60
End: 2025-08-08

## 2025-08-14 ENCOUNTER — HOSPITAL ENCOUNTER (OUTPATIENT)
Dept: WOUND CARE | Age: 60
Discharge: HOME | End: 2025-08-14
Payer: MEDICARE

## 2025-08-14 VITALS
TEMPERATURE: 98.2 F | BODY MASS INDEX: 32.74 KG/M2 | SYSTOLIC BLOOD PRESSURE: 87 MMHG | WEIGHT: 247 LBS | RESPIRATION RATE: 16 BRPM | HEART RATE: 90 BPM | OXYGEN SATURATION: 97 % | HEIGHT: 73 IN | DIASTOLIC BLOOD PRESSURE: 53 MMHG

## 2025-08-14 DIAGNOSIS — E11.621 DIABETIC ULCER OF LEFT MIDFOOT ASSOCIATED WITH TYPE 2 DIABETES MELLITUS, WITH FAT LAYER EXPOSED (HCC): Primary | Chronic | ICD-10-CM

## 2025-08-14 DIAGNOSIS — E11.42 DIABETIC POLYNEUROPATHY ASSOCIATED WITH TYPE 2 DIABETES MELLITUS (HCC): Chronic | ICD-10-CM

## 2025-08-14 DIAGNOSIS — L97.422 DIABETIC ULCER OF LEFT MIDFOOT ASSOCIATED WITH TYPE 2 DIABETES MELLITUS, WITH FAT LAYER EXPOSED (HCC): Primary | Chronic | ICD-10-CM

## 2025-08-14 PROCEDURE — 11042 DBRDMT SUBQ TIS 1ST 20SQCM/<: CPT

## 2025-08-14 RX ORDER — LIDOCAINE HYDROCHLORIDE 20 MG/ML
JELLY TOPICAL PRN
OUTPATIENT
Start: 2025-08-14

## 2025-08-14 RX ORDER — MUPIROCIN 2 %
OINTMENT (GRAM) TOPICAL PRN
OUTPATIENT
Start: 2025-08-14

## 2025-08-14 RX ORDER — BETAMETHASONE DIPROPIONATE 0.5 MG/G
CREAM TOPICAL PRN
OUTPATIENT
Start: 2025-08-14

## 2025-08-14 RX ORDER — LIDOCAINE 40 MG/G
CREAM TOPICAL PRN
OUTPATIENT
Start: 2025-08-14

## 2025-08-14 RX ORDER — BACITRACIN ZINC AND POLYMYXIN B SULFATE 500; 1000 [USP'U]/G; [USP'U]/G
OINTMENT TOPICAL PRN
OUTPATIENT
Start: 2025-08-14

## 2025-08-14 RX ORDER — NEOMYCIN/BACITRACIN/POLYMYXINB 3.5-400-5K
OINTMENT (GRAM) TOPICAL PRN
OUTPATIENT
Start: 2025-08-14

## 2025-08-14 RX ORDER — GINSENG 100 MG
CAPSULE ORAL PRN
OUTPATIENT
Start: 2025-08-14

## 2025-08-14 RX ORDER — LIDOCAINE HYDROCHLORIDE 20 MG/ML
JELLY TOPICAL PRN
Status: DISCONTINUED | OUTPATIENT
Start: 2025-08-14 | End: 2025-08-15 | Stop reason: HOSPADM

## 2025-08-14 RX ORDER — GENTAMICIN SULFATE 1 MG/G
OINTMENT TOPICAL PRN
OUTPATIENT
Start: 2025-08-14

## 2025-08-14 RX ORDER — CLOBETASOL PROPIONATE 0.5 MG/G
OINTMENT TOPICAL PRN
OUTPATIENT
Start: 2025-08-14

## 2025-08-14 RX ORDER — LIDOCAINE 50 MG/G
OINTMENT TOPICAL PRN
OUTPATIENT
Start: 2025-08-14

## 2025-08-14 RX ORDER — SODIUM CHLOR/HYPOCHLOROUS ACID 0.033 %
SOLUTION, IRRIGATION IRRIGATION PRN
Status: DISCONTINUED | OUTPATIENT
Start: 2025-08-14 | End: 2025-08-15 | Stop reason: HOSPADM

## 2025-08-14 RX ORDER — LIDOCAINE HYDROCHLORIDE 40 MG/ML
SOLUTION TOPICAL PRN
OUTPATIENT
Start: 2025-08-14

## 2025-08-14 RX ORDER — TRIAMCINOLONE ACETONIDE 1 MG/G
OINTMENT TOPICAL PRN
OUTPATIENT
Start: 2025-08-14

## 2025-08-14 RX ORDER — SODIUM CHLOR/HYPOCHLOROUS ACID 0.033 %
SOLUTION, IRRIGATION IRRIGATION PRN
OUTPATIENT
Start: 2025-08-14

## 2025-08-14 RX ORDER — SILVER SULFADIAZINE 10 MG/G
CREAM TOPICAL PRN
OUTPATIENT
Start: 2025-08-14

## 2025-08-14 RX ADMIN — Medication: at 12:05

## 2025-08-14 RX ADMIN — LIDOCAINE HYDROCHLORIDE: 20 JELLY TOPICAL at 12:05

## 2025-08-14 ASSESSMENT — PAIN SCALES - GENERAL: PAINLEVEL_OUTOF10: 0

## 2025-08-14 ASSESSMENT — PAIN DESCRIPTION - ORIENTATION: ORIENTATION: OTHER (COMMENT)

## 2025-08-21 ENCOUNTER — HOSPITAL ENCOUNTER (OUTPATIENT)
Dept: WOUND CARE | Age: 60
Discharge: HOME | End: 2025-08-21
Payer: MEDICARE

## 2025-08-21 VITALS
OXYGEN SATURATION: 96 % | TEMPERATURE: 98.3 F | HEIGHT: 73 IN | WEIGHT: 247 LBS | HEART RATE: 102 BPM | RESPIRATION RATE: 18 BRPM | DIASTOLIC BLOOD PRESSURE: 60 MMHG | SYSTOLIC BLOOD PRESSURE: 105 MMHG | BODY MASS INDEX: 32.74 KG/M2

## 2025-08-21 DIAGNOSIS — L97.422 DIABETIC ULCER OF LEFT MIDFOOT ASSOCIATED WITH TYPE 2 DIABETES MELLITUS, WITH FAT LAYER EXPOSED (HCC): Primary | Chronic | ICD-10-CM

## 2025-08-21 DIAGNOSIS — I73.9 PAD (PERIPHERAL ARTERY DISEASE): ICD-10-CM

## 2025-08-21 DIAGNOSIS — E11.42 DIABETIC POLYNEUROPATHY ASSOCIATED WITH TYPE 2 DIABETES MELLITUS (HCC): Chronic | ICD-10-CM

## 2025-08-21 DIAGNOSIS — E11.621 DIABETIC ULCER OF LEFT MIDFOOT ASSOCIATED WITH TYPE 2 DIABETES MELLITUS, WITH FAT LAYER EXPOSED (HCC): Primary | Chronic | ICD-10-CM

## 2025-08-21 PROCEDURE — 99213 OFFICE O/P EST LOW 20 MIN: CPT

## 2025-08-21 RX ORDER — NEOMYCIN/BACITRACIN/POLYMYXINB 3.5-400-5K
OINTMENT (GRAM) TOPICAL PRN
OUTPATIENT
Start: 2025-08-21

## 2025-08-21 RX ORDER — TRIAMCINOLONE ACETONIDE 1 MG/G
OINTMENT TOPICAL PRN
OUTPATIENT
Start: 2025-08-21

## 2025-08-21 RX ORDER — LIDOCAINE HYDROCHLORIDE 20 MG/ML
JELLY TOPICAL PRN
OUTPATIENT
Start: 2025-08-21

## 2025-08-21 RX ORDER — LIDOCAINE 50 MG/G
OINTMENT TOPICAL PRN
OUTPATIENT
Start: 2025-08-21

## 2025-08-21 RX ORDER — LIDOCAINE HYDROCHLORIDE 20 MG/ML
JELLY TOPICAL PRN
Status: DISCONTINUED | OUTPATIENT
Start: 2025-08-21 | End: 2025-08-22 | Stop reason: HOSPADM

## 2025-08-21 RX ORDER — GENTAMICIN SULFATE 1 MG/G
OINTMENT TOPICAL PRN
OUTPATIENT
Start: 2025-08-21

## 2025-08-21 RX ORDER — SODIUM CHLOR/HYPOCHLOROUS ACID 0.033 %
SOLUTION, IRRIGATION IRRIGATION PRN
OUTPATIENT
Start: 2025-08-21

## 2025-08-21 RX ORDER — GINSENG 100 MG
CAPSULE ORAL PRN
OUTPATIENT
Start: 2025-08-21

## 2025-08-21 RX ORDER — LIDOCAINE HYDROCHLORIDE 40 MG/ML
SOLUTION TOPICAL PRN
OUTPATIENT
Start: 2025-08-21

## 2025-08-21 RX ORDER — MUPIROCIN 2 %
OINTMENT (GRAM) TOPICAL PRN
OUTPATIENT
Start: 2025-08-21

## 2025-08-21 RX ORDER — SILVER SULFADIAZINE 10 MG/G
CREAM TOPICAL PRN
OUTPATIENT
Start: 2025-08-21

## 2025-08-21 RX ORDER — BETAMETHASONE DIPROPIONATE 0.5 MG/G
CREAM TOPICAL PRN
OUTPATIENT
Start: 2025-08-21

## 2025-08-21 RX ORDER — CLOBETASOL PROPIONATE 0.5 MG/G
OINTMENT TOPICAL PRN
OUTPATIENT
Start: 2025-08-21

## 2025-08-21 RX ORDER — LIDOCAINE 40 MG/G
CREAM TOPICAL PRN
OUTPATIENT
Start: 2025-08-21

## 2025-08-21 RX ORDER — BACITRACIN ZINC AND POLYMYXIN B SULFATE 500; 1000 [USP'U]/G; [USP'U]/G
OINTMENT TOPICAL PRN
OUTPATIENT
Start: 2025-08-21

## 2025-08-21 RX ADMIN — LIDOCAINE HYDROCHLORIDE: 20 JELLY TOPICAL at 10:33

## 2025-08-21 ASSESSMENT — ENCOUNTER SYMPTOMS
VOMITING: 0
NAUSEA: 0

## 2025-08-21 ASSESSMENT — PAIN SCALES - GENERAL: PAINLEVEL_OUTOF10: 0

## 2025-08-22 ENCOUNTER — TELEPHONE (OUTPATIENT)
Dept: FAMILY MEDICINE CLINIC | Facility: CLINIC | Age: 60
End: 2025-08-22

## 2025-08-28 ENCOUNTER — HOSPITAL ENCOUNTER (OUTPATIENT)
Dept: WOUND CARE | Age: 60
Discharge: HOME | End: 2025-08-28
Payer: MEDICARE

## 2025-08-28 VITALS
RESPIRATION RATE: 16 BRPM | DIASTOLIC BLOOD PRESSURE: 73 MMHG | SYSTOLIC BLOOD PRESSURE: 105 MMHG | WEIGHT: 247 LBS | HEIGHT: 73 IN | TEMPERATURE: 98 F | BODY MASS INDEX: 32.74 KG/M2 | OXYGEN SATURATION: 96 % | HEART RATE: 94 BPM

## 2025-08-28 DIAGNOSIS — L97.412 DIABETIC ULCER OF RIGHT MIDFOOT ASSOCIATED WITH TYPE 2 DIABETES MELLITUS, WITH FAT LAYER EXPOSED (HCC): Chronic | ICD-10-CM

## 2025-08-28 DIAGNOSIS — E11.42 DIABETIC POLYNEUROPATHY ASSOCIATED WITH TYPE 2 DIABETES MELLITUS (HCC): Chronic | ICD-10-CM

## 2025-08-28 DIAGNOSIS — E11.621 DIABETIC ULCER OF RIGHT MIDFOOT ASSOCIATED WITH TYPE 2 DIABETES MELLITUS, WITH FAT LAYER EXPOSED (HCC): Chronic | ICD-10-CM

## 2025-08-28 DIAGNOSIS — E11.621 DIABETIC ULCER OF LEFT MIDFOOT ASSOCIATED WITH TYPE 2 DIABETES MELLITUS, WITH FAT LAYER EXPOSED (HCC): Primary | Chronic | ICD-10-CM

## 2025-08-28 DIAGNOSIS — L97.422 DIABETIC ULCER OF LEFT MIDFOOT ASSOCIATED WITH TYPE 2 DIABETES MELLITUS, WITH FAT LAYER EXPOSED (HCC): Primary | Chronic | ICD-10-CM

## 2025-08-28 PROCEDURE — 11042 DBRDMT SUBQ TIS 1ST 20SQCM/<: CPT

## 2025-08-28 RX ORDER — LIDOCAINE 50 MG/G
OINTMENT TOPICAL PRN
OUTPATIENT
Start: 2025-08-28

## 2025-08-28 RX ORDER — BETAMETHASONE DIPROPIONATE 0.5 MG/G
CREAM TOPICAL PRN
OUTPATIENT
Start: 2025-08-28

## 2025-08-28 RX ORDER — BACITRACIN ZINC AND POLYMYXIN B SULFATE 500; 1000 [USP'U]/G; [USP'U]/G
OINTMENT TOPICAL PRN
OUTPATIENT
Start: 2025-08-28

## 2025-08-28 RX ORDER — LIDOCAINE HYDROCHLORIDE 20 MG/ML
JELLY TOPICAL PRN
OUTPATIENT
Start: 2025-08-28

## 2025-08-28 RX ORDER — SILVER SULFADIAZINE 10 MG/G
CREAM TOPICAL PRN
OUTPATIENT
Start: 2025-08-28

## 2025-08-28 RX ORDER — SODIUM CHLOR/HYPOCHLOROUS ACID 0.033 %
SOLUTION, IRRIGATION IRRIGATION PRN
Status: DISCONTINUED | OUTPATIENT
Start: 2025-08-28 | End: 2025-08-29 | Stop reason: HOSPADM

## 2025-08-28 RX ORDER — CLOBETASOL PROPIONATE 0.5 MG/G
OINTMENT TOPICAL PRN
OUTPATIENT
Start: 2025-08-28

## 2025-08-28 RX ORDER — MUPIROCIN 2 %
OINTMENT (GRAM) TOPICAL PRN
OUTPATIENT
Start: 2025-08-28

## 2025-08-28 RX ORDER — LIDOCAINE 40 MG/G
CREAM TOPICAL PRN
OUTPATIENT
Start: 2025-08-28

## 2025-08-28 RX ORDER — LIDOCAINE HYDROCHLORIDE 40 MG/ML
SOLUTION TOPICAL PRN
OUTPATIENT
Start: 2025-08-28

## 2025-08-28 RX ORDER — GINSENG 100 MG
CAPSULE ORAL PRN
OUTPATIENT
Start: 2025-08-28

## 2025-08-28 RX ORDER — LIDOCAINE HYDROCHLORIDE 20 MG/ML
JELLY TOPICAL PRN
Status: DISCONTINUED | OUTPATIENT
Start: 2025-08-28 | End: 2025-08-29 | Stop reason: HOSPADM

## 2025-08-28 RX ORDER — NEOMYCIN/BACITRACIN/POLYMYXINB 3.5-400-5K
OINTMENT (GRAM) TOPICAL PRN
OUTPATIENT
Start: 2025-08-28

## 2025-08-28 RX ORDER — GENTAMICIN SULFATE 1 MG/G
OINTMENT TOPICAL PRN
OUTPATIENT
Start: 2025-08-28

## 2025-08-28 RX ORDER — TRIAMCINOLONE ACETONIDE 1 MG/G
OINTMENT TOPICAL PRN
OUTPATIENT
Start: 2025-08-28

## 2025-08-28 RX ORDER — SODIUM CHLOR/HYPOCHLOROUS ACID 0.033 %
SOLUTION, IRRIGATION IRRIGATION PRN
OUTPATIENT
Start: 2025-08-28

## 2025-08-28 RX ADMIN — Medication 118 ML: at 15:35

## 2025-08-28 RX ADMIN — LIDOCAINE HYDROCHLORIDE: 20 JELLY TOPICAL at 15:34

## 2025-08-28 ASSESSMENT — PAIN SCALES - GENERAL: PAINLEVEL_OUTOF10: 0

## 2025-09-05 ENCOUNTER — HOSPITAL ENCOUNTER (OUTPATIENT)
Dept: WOUND CARE | Age: 60
Discharge: HOME | End: 2025-09-05
Payer: MEDICARE

## 2025-09-05 ENCOUNTER — TELEPHONE (OUTPATIENT)
Dept: FAMILY MEDICINE CLINIC | Facility: CLINIC | Age: 60
End: 2025-09-05

## 2025-09-05 VITALS
SYSTOLIC BLOOD PRESSURE: 111 MMHG | RESPIRATION RATE: 18 BRPM | DIASTOLIC BLOOD PRESSURE: 62 MMHG | OXYGEN SATURATION: 97 % | TEMPERATURE: 98.3 F | HEART RATE: 94 BPM

## 2025-09-05 DIAGNOSIS — L97.422 DIABETIC ULCER OF LEFT MIDFOOT ASSOCIATED WITH TYPE 2 DIABETES MELLITUS, WITH FAT LAYER EXPOSED (HCC): Primary | Chronic | ICD-10-CM

## 2025-09-05 DIAGNOSIS — E11.621 DIABETIC ULCER OF LEFT MIDFOOT ASSOCIATED WITH TYPE 2 DIABETES MELLITUS, WITH FAT LAYER EXPOSED (HCC): Primary | Chronic | ICD-10-CM

## 2025-09-05 DIAGNOSIS — E11.42 DIABETIC POLYNEUROPATHY ASSOCIATED WITH TYPE 2 DIABETES MELLITUS (HCC): Chronic | ICD-10-CM

## 2025-09-05 PROCEDURE — 11042 DBRDMT SUBQ TIS 1ST 20SQCM/<: CPT

## 2025-09-05 RX ORDER — SODIUM CHLOR/HYPOCHLOROUS ACID 0.033 %
SOLUTION, IRRIGATION IRRIGATION PRN
OUTPATIENT
Start: 2025-09-05

## 2025-09-05 RX ORDER — LIDOCAINE 40 MG/G
CREAM TOPICAL PRN
OUTPATIENT
Start: 2025-09-05

## 2025-09-05 RX ORDER — BETAMETHASONE DIPROPIONATE 0.5 MG/G
CREAM TOPICAL PRN
OUTPATIENT
Start: 2025-09-05

## 2025-09-05 RX ORDER — LIDOCAINE HYDROCHLORIDE 20 MG/ML
JELLY TOPICAL PRN
OUTPATIENT
Start: 2025-09-05

## 2025-09-05 RX ORDER — TRIAMCINOLONE ACETONIDE 1 MG/G
OINTMENT TOPICAL PRN
OUTPATIENT
Start: 2025-09-05

## 2025-09-05 RX ORDER — CLOBETASOL PROPIONATE 0.5 MG/G
OINTMENT TOPICAL PRN
OUTPATIENT
Start: 2025-09-05

## 2025-09-05 RX ORDER — LIDOCAINE HYDROCHLORIDE 20 MG/ML
JELLY TOPICAL PRN
Status: DISCONTINUED | OUTPATIENT
Start: 2025-09-05 | End: 2025-09-06 | Stop reason: HOSPADM

## 2025-09-05 RX ORDER — BACITRACIN ZINC AND POLYMYXIN B SULFATE 500; 1000 [USP'U]/G; [USP'U]/G
OINTMENT TOPICAL PRN
OUTPATIENT
Start: 2025-09-05

## 2025-09-05 RX ORDER — LIDOCAINE HYDROCHLORIDE 40 MG/ML
SOLUTION TOPICAL PRN
OUTPATIENT
Start: 2025-09-05

## 2025-09-05 RX ORDER — NEOMYCIN/BACITRACIN/POLYMYXINB 3.5-400-5K
OINTMENT (GRAM) TOPICAL PRN
OUTPATIENT
Start: 2025-09-05

## 2025-09-05 RX ORDER — GINSENG 100 MG
CAPSULE ORAL PRN
OUTPATIENT
Start: 2025-09-05

## 2025-09-05 RX ORDER — SILVER SULFADIAZINE 10 MG/G
CREAM TOPICAL PRN
OUTPATIENT
Start: 2025-09-05

## 2025-09-05 RX ORDER — LIDOCAINE 50 MG/G
OINTMENT TOPICAL PRN
OUTPATIENT
Start: 2025-09-05

## 2025-09-05 RX ORDER — GENTAMICIN SULFATE 1 MG/G
OINTMENT TOPICAL PRN
OUTPATIENT
Start: 2025-09-05

## 2025-09-05 RX ORDER — MUPIROCIN 2 %
OINTMENT (GRAM) TOPICAL PRN
OUTPATIENT
Start: 2025-09-05

## 2025-09-05 RX ADMIN — LIDOCAINE HYDROCHLORIDE: 20 JELLY TOPICAL at 13:24

## 2025-09-05 ASSESSMENT — PAIN SCALES - GENERAL: PAINLEVEL_OUTOF10: 8

## (undated) DEVICE — AIRLIFE™ OXYGEN TUBING 7 FEET (2.1 M) CRUSH RESISTANT OXYGEN TUBING, VINYL TIPPED: Brand: AIRLIFE™

## (undated) DEVICE — SYRINGE, LUER SLIP, STERILE, 60ML: Brand: MEDLINE

## (undated) DEVICE — APPLICATOR MEDICATED 26 CC SOLUTION HI LT ORNG CHLORAPREP

## (undated) DEVICE — CANNULA NSL ORAL AD FOR CAPNOFLEX CO2 O2 AIRLFE

## (undated) DEVICE — INTENDED FOR TISSUE SEPARATION, AND OTHER PROCEDURES THAT REQUIRE A SHARP SURGICAL BLADE TO PUNCTURE OR CUT.: Brand: BARD-PARKER SAFETY BLADES SIZE 15, STERILE

## (undated) DEVICE — ADHESIVE SKIN CLSR 0.7ML TOP DERMBND ADV

## (undated) DEVICE — SYRINGE MED 3ML CLR PLAS STD N CTRL LUERLOCK TIP DISP

## (undated) DEVICE — KENDALL RADIOLUCENT FOAM MONITORING ELECTRODE RECTANGULAR SHAPE: Brand: KENDALL

## (undated) DEVICE — SOLUTION IRRIG 1000ML 0.9% SOD CHL USP POUR PLAS BTL

## (undated) DEVICE — GARMENT,MEDLINE,DVT,INT,CALF,MED, GEN2: Brand: MEDLINE

## (undated) DEVICE — LUBE JELLY FOIL PACK 1.4 OZ: Brand: MEDLINE INDUSTRIES, INC.

## (undated) DEVICE — GASTRIC SLEEVE: Brand: MEDLINE INDUSTRIES, INC.

## (undated) DEVICE — TUBING INSUFFLATION SMK EVAC HI FLO SET PNEUMOCLEAR

## (undated) DEVICE — AGENT HEMSTAT W3XL4IN OXIDIZED REGENERATED CELOS ABSRB FOR

## (undated) DEVICE — CONTAINER FORMALIN PFILLED 10% NBF 40ML

## (undated) DEVICE — RELOAD STPL L60MM H1.5-3.6MM REG TISS BLU GRIPPING SURF B

## (undated) DEVICE — GAUZE,SPONGE,4"X4",12PLY,WOVEN,NS,LF: Brand: MEDLINE

## (undated) DEVICE — TROCAR: Brand: KII FIOS FIRST ENTRY

## (undated) DEVICE — SUTURE MCRYL SZ 4-0 L27IN ABSRB UD L19MM PS-2 1/2 CIR PRIM Y426H

## (undated) DEVICE — FORCEPS BX L240CM JAW DIA2.8MM L CAP W/ NDL MIC MESH TOOTH

## (undated) DEVICE — APPLIER CLP L SHFT DIA12MM 20 ROT MULT LIGACLP

## (undated) DEVICE — TROCAR: Brand: KII® OPTICAL ACCESS SYSTEM

## (undated) DEVICE — CARDINAL HEALTH FLEXIBLE LIGHT HANDLE COVER: Brand: CARDINAL HEALTH

## (undated) DEVICE — NEEDLE SYR 18GA L1.5IN RED PLAS HUB S STL BLNT FILL W/O

## (undated) DEVICE — SUTURE ETHBND EXCEL SZ 0 L30IN NONABSORBABLE GRN L26MM SH X834H

## (undated) DEVICE — CONNECTOR TBNG OD5-7MM O2 END DISP

## (undated) DEVICE — INTRODUCER TUBE COUDE TIP AD 15 FRX70 CM CALIB POLYETH DISP

## (undated) DEVICE — TROCAR: Brand: KII® SLEEVE

## (undated) DEVICE — SEALANT TISS 4 CC FIBRIN VISTASEAL

## (undated) DEVICE — SYRINGE MED 10ML LUERLOCK TIP W/O SFTY DISP

## (undated) DEVICE — YANKAUER,BULB TIP,W/O VENT,RIGID,STERILE: Brand: MEDLINE

## (undated) DEVICE — RELOAD STPL H4.1X2MM DIA60MM THCK TISS GRN 6 ROW PWR GST B

## (undated) DEVICE — BLOCK BITE AD 60FR W/ VELC STRP ADDRESSES MOST PT AND

## (undated) DEVICE — SINGLE PORT MANIFOLD: Brand: NEPTUNE 2

## (undated) DEVICE — RELOAD STPL L60MM H2.3-4.2MM VERY THCK TISS BLK 6 ROW

## (undated) DEVICE — THE TORRENT IRRIGATION TUBING IS INTENDED TO PROVIDE IRRIGATION VIA IRRIGATION FLUIDS, SUCH AS STERILE WATER, DURING GASTROINTESTINAL ENDOSCOPIC PROCEDURES WHEN USED IN CONJUNCTION WITH AN IRRIGATION PUMP OR ELECTROSURGICAL UNIT.: Brand: TORRENT

## (undated) DEVICE — APPLICATOR LAP 35 CM 2 RIGID VISTASEAL

## (undated) DEVICE — SHEET,DRAPE,53X77,STERILE: Brand: MEDLINE

## (undated) DEVICE — VISIGI 3D®  CALIBRATION SYSTEM  SIZE 40FR SLEEVE/STD: Brand: BOEHRINGER® VISIGI™ 3D SLEEVE GASTRECTOMY CALIBRATION SYSTEM, SIZE 40FR